# Patient Record
Sex: MALE | Race: WHITE | NOT HISPANIC OR LATINO | URBAN - METROPOLITAN AREA
[De-identification: names, ages, dates, MRNs, and addresses within clinical notes are randomized per-mention and may not be internally consistent; named-entity substitution may affect disease eponyms.]

---

## 2017-10-23 ENCOUNTER — INPATIENT (INPATIENT)
Facility: HOSPITAL | Age: 32
LOS: 4 days | Discharge: HOME | End: 2017-10-28
Attending: INTERNAL MEDICINE

## 2017-10-23 DIAGNOSIS — F43.10 POST-TRAUMATIC STRESS DISORDER, UNSPECIFIED: ICD-10-CM

## 2017-10-23 DIAGNOSIS — F93.0 SEPARATION ANXIETY DISORDER OF CHILDHOOD: ICD-10-CM

## 2017-10-23 DIAGNOSIS — F13.20 SEDATIVE, HYPNOTIC OR ANXIOLYTIC DEPENDENCE, UNCOMPLICATED: ICD-10-CM

## 2017-10-23 DIAGNOSIS — F17.200 NICOTINE DEPENDENCE, UNSPECIFIED, UNCOMPLICATED: ICD-10-CM

## 2017-10-23 DIAGNOSIS — F11.20 OPIOID DEPENDENCE, UNCOMPLICATED: ICD-10-CM

## 2017-10-23 DIAGNOSIS — F15.20 OTHER STIMULANT DEPENDENCE, UNCOMPLICATED: ICD-10-CM

## 2017-10-23 DIAGNOSIS — F33.2 MAJOR DEPRESSIVE DISORDER, RECURRENT SEVERE WITHOUT PSYCHOTIC FEATURES: ICD-10-CM

## 2017-10-23 DIAGNOSIS — G40.909 EPILEPSY, UNSPECIFIED, NOT INTRACTABLE, WITHOUT STATUS EPILEPTICUS: ICD-10-CM

## 2017-10-23 DIAGNOSIS — F41.9 ANXIETY DISORDER, UNSPECIFIED: ICD-10-CM

## 2017-10-23 DIAGNOSIS — F41.1 GENERALIZED ANXIETY DISORDER: ICD-10-CM

## 2017-10-28 ENCOUNTER — INPATIENT (INPATIENT)
Facility: HOSPITAL | Age: 32
LOS: 9 days | Discharge: HOME | End: 2017-11-07
Attending: INTERNAL MEDICINE

## 2017-10-28 DIAGNOSIS — F93.0 SEPARATION ANXIETY DISORDER OF CHILDHOOD: ICD-10-CM

## 2017-10-28 DIAGNOSIS — F15.20 OTHER STIMULANT DEPENDENCE, UNCOMPLICATED: ICD-10-CM

## 2017-10-28 DIAGNOSIS — F13.20 SEDATIVE, HYPNOTIC OR ANXIOLYTIC DEPENDENCE, UNCOMPLICATED: ICD-10-CM

## 2017-10-28 DIAGNOSIS — F11.20 OPIOID DEPENDENCE, UNCOMPLICATED: ICD-10-CM

## 2017-10-28 DIAGNOSIS — F41.9 ANXIETY DISORDER, UNSPECIFIED: ICD-10-CM

## 2017-10-28 DIAGNOSIS — F43.10 POST-TRAUMATIC STRESS DISORDER, UNSPECIFIED: ICD-10-CM

## 2017-10-28 DIAGNOSIS — F17.200 NICOTINE DEPENDENCE, UNSPECIFIED, UNCOMPLICATED: ICD-10-CM

## 2017-10-28 DIAGNOSIS — G40.909 EPILEPSY, UNSPECIFIED, NOT INTRACTABLE, WITHOUT STATUS EPILEPTICUS: ICD-10-CM

## 2017-11-01 DIAGNOSIS — F17.200 NICOTINE DEPENDENCE, UNSPECIFIED, UNCOMPLICATED: ICD-10-CM

## 2017-11-01 DIAGNOSIS — F32.9 MAJOR DEPRESSIVE DISORDER, SINGLE EPISODE, UNSPECIFIED: ICD-10-CM

## 2017-11-01 DIAGNOSIS — G40.909 EPILEPSY, UNSPECIFIED, NOT INTRACTABLE, WITHOUT STATUS EPILEPTICUS: ICD-10-CM

## 2017-11-01 DIAGNOSIS — F41.9 ANXIETY DISORDER, UNSPECIFIED: ICD-10-CM

## 2017-11-01 DIAGNOSIS — F43.10 POST-TRAUMATIC STRESS DISORDER, UNSPECIFIED: ICD-10-CM

## 2017-11-01 DIAGNOSIS — F11.20 OPIOID DEPENDENCE, UNCOMPLICATED: ICD-10-CM

## 2017-11-12 DIAGNOSIS — F11.20 OPIOID DEPENDENCE, UNCOMPLICATED: ICD-10-CM

## 2017-11-12 DIAGNOSIS — F17.210 NICOTINE DEPENDENCE, CIGARETTES, UNCOMPLICATED: ICD-10-CM

## 2017-11-12 DIAGNOSIS — Z51.89 ENCOUNTER FOR OTHER SPECIFIED AFTERCARE: ICD-10-CM

## 2017-11-12 DIAGNOSIS — F41.1 GENERALIZED ANXIETY DISORDER: ICD-10-CM

## 2017-11-12 DIAGNOSIS — F32.9 MAJOR DEPRESSIVE DISORDER, SINGLE EPISODE, UNSPECIFIED: ICD-10-CM

## 2017-11-12 DIAGNOSIS — F13.20 SEDATIVE, HYPNOTIC OR ANXIOLYTIC DEPENDENCE, UNCOMPLICATED: ICD-10-CM

## 2017-11-12 DIAGNOSIS — F43.10 POST-TRAUMATIC STRESS DISORDER, UNSPECIFIED: ICD-10-CM

## 2017-11-12 DIAGNOSIS — G40.909 EPILEPSY, UNSPECIFIED, NOT INTRACTABLE, WITHOUT STATUS EPILEPTICUS: ICD-10-CM

## 2018-04-06 ENCOUNTER — INPATIENT (INPATIENT)
Facility: HOSPITAL | Age: 33
LOS: 12 days | Discharge: HOME | End: 2018-04-19
Attending: PSYCHIATRY & NEUROLOGY | Admitting: PSYCHIATRY & NEUROLOGY

## 2018-04-06 VITALS
DIASTOLIC BLOOD PRESSURE: 70 MMHG | SYSTOLIC BLOOD PRESSURE: 120 MMHG | RESPIRATION RATE: 20 BRPM | TEMPERATURE: 97 F | HEART RATE: 78 BPM

## 2018-04-06 DIAGNOSIS — F17.210 NICOTINE DEPENDENCE, CIGARETTES, UNCOMPLICATED: ICD-10-CM

## 2018-04-06 DIAGNOSIS — R69 ILLNESS, UNSPECIFIED: ICD-10-CM

## 2018-04-06 DIAGNOSIS — F11.10 OPIOID ABUSE, UNCOMPLICATED: ICD-10-CM

## 2018-04-06 DIAGNOSIS — F31.32 BIPOLAR DISORDER, CURRENT EPISODE DEPRESSED, MODERATE: ICD-10-CM

## 2018-04-06 DIAGNOSIS — F13.20 SEDATIVE, HYPNOTIC OR ANXIOLYTIC DEPENDENCE, UNCOMPLICATED: ICD-10-CM

## 2018-04-06 LAB
ALBUMIN SERPL ELPH-MCNC: 3.7 G/DL — SIGNIFICANT CHANGE UP (ref 3.5–5.2)
ALP SERPL-CCNC: 48 U/L — SIGNIFICANT CHANGE UP (ref 30–115)
ALT FLD-CCNC: 11 U/L — SIGNIFICANT CHANGE UP (ref 0–41)
APAP SERPL-MCNC: <5 UG/ML — LOW (ref 10–30)
AST SERPL-CCNC: 14 U/L — SIGNIFICANT CHANGE UP (ref 0–41)
BASOPHILS # BLD AUTO: 0.03 K/UL — SIGNIFICANT CHANGE UP (ref 0–0.2)
BASOPHILS NFR BLD AUTO: 0.6 % — SIGNIFICANT CHANGE UP (ref 0–1)
BILIRUB SERPL-MCNC: <0.2 MG/DL — SIGNIFICANT CHANGE UP (ref 0.2–1.2)
BUN SERPL-MCNC: 13 MG/DL — SIGNIFICANT CHANGE UP (ref 10–20)
CALCIUM SERPL-MCNC: 8.2 MG/DL — LOW (ref 8.5–10.1)
CHLORIDE SERPL-SCNC: 103 MMOL/L — SIGNIFICANT CHANGE UP (ref 98–110)
CO2 SERPL-SCNC: 30 MMOL/L — SIGNIFICANT CHANGE UP (ref 17–32)
CREAT SERPL-MCNC: 1 MG/DL — SIGNIFICANT CHANGE UP (ref 0.7–1.5)
EOSINOPHIL # BLD AUTO: 0.27 K/UL — SIGNIFICANT CHANGE UP (ref 0–0.7)
EOSINOPHIL NFR BLD AUTO: 5.3 % — SIGNIFICANT CHANGE UP (ref 0–8)
ETHANOL SERPL-MCNC: <10 MG/DL — HIGH
GLUCOSE SERPL-MCNC: 94 MG/DL — SIGNIFICANT CHANGE UP (ref 70–99)
HCT VFR BLD CALC: 31.4 % — LOW (ref 42–52)
HGB BLD-MCNC: 10.5 G/DL — LOW (ref 14–18)
IMM GRANULOCYTES NFR BLD AUTO: 0.4 % — HIGH (ref 0.1–0.3)
LYMPHOCYTES # BLD AUTO: 1.82 K/UL — SIGNIFICANT CHANGE UP (ref 1.2–3.4)
LYMPHOCYTES # BLD AUTO: 35.5 % — SIGNIFICANT CHANGE UP (ref 20.5–51.1)
MCHC RBC-ENTMCNC: 30.1 PG — SIGNIFICANT CHANGE UP (ref 27–31)
MCHC RBC-ENTMCNC: 33.4 G/DL — SIGNIFICANT CHANGE UP (ref 32–37)
MCV RBC AUTO: 90 FL — SIGNIFICANT CHANGE UP (ref 80–94)
MONOCYTES # BLD AUTO: 0.52 K/UL — SIGNIFICANT CHANGE UP (ref 0.1–0.6)
MONOCYTES NFR BLD AUTO: 10.1 % — HIGH (ref 1.7–9.3)
NEUTROPHILS # BLD AUTO: 2.47 K/UL — SIGNIFICANT CHANGE UP (ref 1.4–6.5)
NEUTROPHILS NFR BLD AUTO: 48.1 % — SIGNIFICANT CHANGE UP (ref 42.2–75.2)
PLATELET # BLD AUTO: 188 K/UL — SIGNIFICANT CHANGE UP (ref 130–400)
POTASSIUM SERPL-MCNC: 4.1 MMOL/L — SIGNIFICANT CHANGE UP (ref 3.5–5)
POTASSIUM SERPL-SCNC: 4.1 MMOL/L — SIGNIFICANT CHANGE UP (ref 3.5–5)
PROT SERPL-MCNC: 6 G/DL — SIGNIFICANT CHANGE UP (ref 6–8)
RBC # BLD: 3.49 M/UL — LOW (ref 4.7–6.1)
RBC # FLD: 13.6 % — SIGNIFICANT CHANGE UP (ref 11.5–14.5)
SALICYLATES SERPL-MCNC: <0.3 MG/DL — LOW (ref 4–30)
SODIUM SERPL-SCNC: 142 MMOL/L — SIGNIFICANT CHANGE UP (ref 135–146)
WBC # BLD: 5.13 K/UL — SIGNIFICANT CHANGE UP (ref 4.8–10.8)
WBC # FLD AUTO: 5.13 K/UL — SIGNIFICANT CHANGE UP (ref 4.8–10.8)
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
## 2018-04-06 NOTE — ED BEHAVIORAL HEALTH ASSESSMENT NOTE - RISK ASSESSMENT
Pt is male, + mood symptoms, + prior attempts, +substance use, + impulsive and limited support, endorsing active SI with plan  Will be admitted to IPP for mood stablization

## 2018-04-06 NOTE — ED PROVIDER NOTE - NS ED ROS FT
Eyes:  No visual changes, eye pain or discharge.  ENMT:  No hearing changes, pain, discharge or infections. No neck pain or stiffness.  Cardiac:  No chest pain, SOB or edema. No chest pain with exertion.  Respiratory:  No cough or respiratory distress. No hemoptysis. No history of asthma or RAD.  GI:  No nausea, vomiting, diarrhea or abdominal pain.  :  No dysuria, frequency or burning.  MS:  No myalgia, muscle weakness, joint pain or back pain.  Neuro:  No headache or weakness.  No LOC.  Skin:  No skin rash.   Endocrine: No history of thyroid disease or diabetes.

## 2018-04-06 NOTE — ED BEHAVIORAL HEALTH ASSESSMENT NOTE - DIFFERENTIAL
Depression, bipolar disorder, substance induced mood disorder  opioid use disorder, sedative/hypnotic use disorder

## 2018-04-06 NOTE — ED BEHAVIORAL HEALTH ASSESSMENT NOTE - PSYCHIATRIC ISSUES AND PLAN (INCLUDE STANDING AND PRN MEDICATION)
Continue Neurontin 600mg q8, Remeron 15mg qHS, will restart Lamictal 25mg q24 with plan to titrate to therapeutic dose

## 2018-04-06 NOTE — ED ADULT NURSE NOTE - NS TRANSFER PATIENT BELONGINGS
bluetube bagpack with additional item shoe coat/Electronic Device (specify)/Other belongings/Cell Phone/PDA (specify)/Clothing

## 2018-04-06 NOTE — ED BEHAVIORAL HEALTH ASSESSMENT NOTE - SUICIDE RISK FACTORS
Agitation/severe anxiety/Unable to engage in safety planning/Substance abuse/dependence/Hopelessness/Mood episode/Highly impulsive behavior
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
## 2018-04-06 NOTE — ED PROVIDER NOTE - OBJECTIVE STATEMENT
32 y M pmh including drug use history, recently at methadone clinic, psychiatric history cc suicidal ideation with plan to go into woods and slit wrists. denies drug or alcohol use today. denies homicidal ideation. endorses depression.

## 2018-04-06 NOTE — ED BEHAVIORAL HEALTH ASSESSMENT NOTE - NS ED BHA HOMICIDALITY PRESENT AGGRESSION PROPERTY LIFETIME
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

## 2018-04-06 NOTE — ED PROVIDER NOTE - ATTENDING CONTRIBUTION TO CARE
I personally evaluated the patient. I reviewed the Resident’s or Physician Assistant’s note (as assigned above), and agree with the findings and plan except as documented in my note.  I have seen this patient with a scribe. Please see documentation of my history and physical examination and plan. I agree with scribe documentation except as indicated in my notes.
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
## 2018-04-06 NOTE — ED PROVIDER NOTE - PHYSICAL EXAMINATION
CONSTITUTIONAL: Well-developed; well-nourished; in no acute distress.   SKIN: warm, dry  HEAD: Normocephalic; atraumatic.  EYES: no conj injection  ENT: No nasal discharge; airway clear.  NECK: Supple; non tender.  CARD: S1, S2 normal; no murmurs, gallops, or rubs. Regular rate and rhythm.   RESP: No wheezes, rales or rhonchi.  ABD: soft ntnd  EXT: Normal ROM.  No clubbing, cyanosis or edema.   LYMPH: No acute cervical adenopathy.  NEURO: Alert, oriented, grossly unremarkable  PSYCH: flat affect
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
## 2018-04-06 NOTE — ED BEHAVIORAL HEALTH ASSESSMENT NOTE - AXIS IV
Housing problems/Problems with primary support/Problem related to social environment/Occupational problems/Economic problems

## 2018-04-06 NOTE — ED BEHAVIORAL HEALTH ASSESSMENT NOTE - SUBSTANCE ISSUES AND PLAN (INCLUDE STANDING AND PRN MEDICATION)
Will start tapering Klonopin dose from 1mg q8 and 0.5mg aAM + qnoon+ 1mg qHS. As pt may have high tolerance due to additional use will put klonopin  0.5mg q6 prn for objective signs of withdrawal including tachycardia, diaphoresis, tremor; Nicotine patch; Methadone dose needs to be confirmed tomorrow morning ([facility] [phone])

## 2018-04-06 NOTE — ED BEHAVIORAL HEALTH ASSESSMENT NOTE - DESCRIPTION
Athlete's foot Pt grew up with parents and 2 brothers. States he went to 8th grade. Never . No children. Pt is calm and cooperative.

## 2018-04-06 NOTE — ED BEHAVIORAL HEALTH ASSESSMENT NOTE - DESCRIPTION (FIRST USE, LAST USE, QUANTITY, FREQUENCY, DURATION)
Smoke 1 PPD 1-2 joints per week; Since 13; Last use this week Long history of heroin including IV use. Currently on MMTP and denies other opioid use for last 9 months Pt currently prescribed Klonopin 1mg q8 but reports periodic use of xanax in addition

## 2018-04-06 NOTE — ED PROVIDER NOTE - MEDICAL DECISION MAKING DETAILS
psych evaluted pt report admission to psych, pt aware and agrees, on 1:1 currently cooperative, expresses SI.
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
## 2018-04-07 DIAGNOSIS — F11.20 OPIOID DEPENDENCE, UNCOMPLICATED: ICD-10-CM

## 2018-04-07 DIAGNOSIS — F32.9 MAJOR DEPRESSIVE DISORDER, SINGLE EPISODE, UNSPECIFIED: ICD-10-CM

## 2018-04-07 DIAGNOSIS — S60.219A CONTUSION OF UNSPECIFIED WRIST, INITIAL ENCOUNTER: ICD-10-CM

## 2018-04-07 DIAGNOSIS — R45.851 SUICIDAL IDEATIONS: ICD-10-CM

## 2018-04-07 DIAGNOSIS — S05.00XA INJURY OF CONJUNCTIVA AND CORNEAL ABRASION WITHOUT FOREIGN BODY, UNSPECIFIED EYE, INITIAL ENCOUNTER: ICD-10-CM

## 2018-04-07 RX ORDER — GENTAMICIN SULFATE 3 MG/ML
1 SOLUTION/ DROPS OPHTHALMIC DAILY
Qty: 0 | Refills: 0 | Status: COMPLETED | OUTPATIENT
Start: 2018-04-07 | End: 2018-04-14

## 2018-04-07 RX ORDER — CLONAZEPAM 1 MG
0.5 TABLET ORAL EVERY 6 HOURS
Qty: 0 | Refills: 0 | Status: DISCONTINUED | OUTPATIENT
Start: 2018-04-07 | End: 2018-04-07

## 2018-04-07 RX ORDER — METHADONE HYDROCHLORIDE 40 MG/1
165 TABLET ORAL DAILY
Qty: 0 | Refills: 0 | Status: DISCONTINUED | OUTPATIENT
Start: 2018-04-07 | End: 2018-04-09

## 2018-04-07 RX ORDER — GABAPENTIN 400 MG/1
600 CAPSULE ORAL THREE TIMES A DAY
Qty: 0 | Refills: 0 | Status: DISCONTINUED | OUTPATIENT
Start: 2018-04-07 | End: 2018-04-13

## 2018-04-07 RX ORDER — CLONAZEPAM 1 MG
0.5 TABLET ORAL DAILY
Qty: 0 | Refills: 0 | Status: DISCONTINUED | OUTPATIENT
Start: 2018-04-07 | End: 2018-04-11

## 2018-04-07 RX ORDER — METHADONE HYDROCHLORIDE 40 MG/1
5 TABLET ORAL DAILY
Qty: 0 | Refills: 0 | Status: DISCONTINUED | OUTPATIENT
Start: 2018-04-07 | End: 2018-04-14

## 2018-04-07 RX ORDER — NICOTINE POLACRILEX 2 MG
1 GUM BUCCAL DAILY
Qty: 0 | Refills: 0 | Status: DISCONTINUED | OUTPATIENT
Start: 2018-04-07 | End: 2018-04-19

## 2018-04-07 RX ORDER — CLONAZEPAM 1 MG
1 TABLET ORAL AT BEDTIME
Qty: 0 | Refills: 0 | Status: DISCONTINUED | OUTPATIENT
Start: 2018-04-07 | End: 2018-04-11

## 2018-04-07 RX ORDER — METHADONE HYDROCHLORIDE 40 MG/1
160 TABLET ORAL DAILY
Qty: 0 | Refills: 0 | Status: DISCONTINUED | OUTPATIENT
Start: 2018-04-07 | End: 2018-04-14

## 2018-04-07 RX ORDER — MIRTAZAPINE 45 MG/1
15 TABLET, ORALLY DISINTEGRATING ORAL AT BEDTIME
Qty: 0 | Refills: 0 | Status: DISCONTINUED | OUTPATIENT
Start: 2018-04-07 | End: 2018-04-16

## 2018-04-07 RX ORDER — CLONAZEPAM 1 MG
0.5 TABLET ORAL
Qty: 0 | Refills: 0 | Status: DISCONTINUED | OUTPATIENT
Start: 2018-04-07 | End: 2018-04-11

## 2018-04-07 RX ORDER — LAMOTRIGINE 25 MG/1
25 TABLET, ORALLY DISINTEGRATING ORAL DAILY
Qty: 0 | Refills: 0 | Status: DISCONTINUED | OUTPATIENT
Start: 2018-04-07 | End: 2018-04-09

## 2018-04-07 RX ADMIN — METHADONE HYDROCHLORIDE 5 MILLIGRAM(S): 40 TABLET ORAL at 09:21

## 2018-04-07 RX ADMIN — GABAPENTIN 600 MILLIGRAM(S): 400 CAPSULE ORAL at 20:36

## 2018-04-07 RX ADMIN — GABAPENTIN 600 MILLIGRAM(S): 400 CAPSULE ORAL at 13:37

## 2018-04-07 RX ADMIN — LAMOTRIGINE 25 MILLIGRAM(S): 25 TABLET, ORALLY DISINTEGRATING ORAL at 09:17

## 2018-04-07 RX ADMIN — Medication 1 MILLIGRAM(S): at 20:36

## 2018-04-07 RX ADMIN — Medication 0.5 MILLIGRAM(S): at 12:07

## 2018-04-07 RX ADMIN — Medication 1 PATCH: at 09:18

## 2018-04-07 RX ADMIN — GABAPENTIN 600 MILLIGRAM(S): 400 CAPSULE ORAL at 09:17

## 2018-04-07 RX ADMIN — Medication 0.5 MILLIGRAM(S): at 09:17

## 2018-04-07 RX ADMIN — MIRTAZAPINE 15 MILLIGRAM(S): 45 TABLET, ORALLY DISINTEGRATING ORAL at 20:37

## 2018-04-07 RX ADMIN — METHADONE HYDROCHLORIDE 160 MILLIGRAM(S): 40 TABLET ORAL at 09:21

## 2018-04-07 NOTE — H&P ADULT - ASSESSMENT
32 y M pmh including drug use history, recently at methadone clinic, psychiatric history cc suicidal ideation with plan to go into woods and slit wrists. denies drug or alcohol use today. denies homicidal ideation. endorses depression.  lab

## 2018-04-07 NOTE — H&P ADULT - NSHPLABSRESULTS_GEN_ALL_CORE
10.5   5.13  )-----------( 188      ( 06 Apr 2018 21:11 )             31.4       04-06    142  |  103  |  13  ----------------------------<  94  4.1   |  30  |  1.0    Ca    8.2<L>      06 Apr 2018 21:11    TPro  6.0  /  Alb  3.7  /  TBili  <0.2  /  DBili  x   /  AST  14  /  ALT  11  /  AlkPhos  48  04-06                      Lactate Trend            CAPILLARY BLOOD GLUCOSE
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
## 2018-04-07 NOTE — H&P ADULT - NSHPPHYSICALEXAM_GEN_ALL_CORE
GENERAL:  33y/o Male NAD, resting comfortably.  HEAD:  Atraumatic, Normocephalic  EYES: EOMI, PERRLA, conjunctiva and sclera care red  NECK: Supple, No JVD, no cervical lymphadenopathy, non-tender  CHEST/LUNG: Clear to auscultation bilaterally; No wheeze, rhonchi, or rales  HEART: Regular rate and rhythm; S1&S2  ABDOMEN: Soft, Nontender, Nondistended x 4 quadrants; Bowel sounds present  EXTREMITIES:   Peripheral Pulses Present, No clubbing, no cyanosis, or no edema, no calf tenderness  PSYCH: AAOx3, cooperative, appropriate  NEUROLOGY: WNL  SKIN: fungal /foot area

## 2018-04-07 NOTE — H&P ADULT - NSHPREVIEWOFSYSTEMS_GEN_ALL_CORE
REVIEW OF SYSTEMS:    CONSTITUTIONAL: No weakness, fevers or chills  EYES/ENT: No visual changes;  No vertigo or throat pain  red slera/congunctiva  NECK: No pain or stiffness  RESPIRATORY: No cough, wheezing, hemoptysis; No shortness of breath  CARDIOVASCULAR: No chest pain or palpitations  GASTROINTESTINAL: No abdominal or epigastric pain. No nausea, vomiting, or hematemesis; No diarrhea or constipation. No melena or hematochezia.  GENITOURINARY: No dysuria, frequency or hematuria  NEUROLOGICAL: No numbness or weakness  SKIN: No itching, rashes

## 2018-04-07 NOTE — PATIENT PROFILE BEHAVIORAL HEALTH - NSBHTYPATTPT_PSY_A_CORE
aborted/previous attempts/Tried to slit wrist one month ago; OD on heroin after father's death in 2016

## 2018-04-07 NOTE — PATIENT PROFILE BEHAVIORAL HEALTH - REASON FOR ADMISSION
Patient came in with complains of not wanting to "live anymore; and my life is a waste" related to increasing depression and substance abuse.

## 2018-04-08 RX ADMIN — Medication 1 MILLIGRAM(S): at 21:14

## 2018-04-08 RX ADMIN — METHADONE HYDROCHLORIDE 5 MILLIGRAM(S): 40 TABLET ORAL at 08:07

## 2018-04-08 RX ADMIN — GABAPENTIN 600 MILLIGRAM(S): 400 CAPSULE ORAL at 13:10

## 2018-04-08 RX ADMIN — Medication 0.5 MILLIGRAM(S): at 11:16

## 2018-04-08 RX ADMIN — Medication 1 PATCH: at 08:15

## 2018-04-08 RX ADMIN — GABAPENTIN 600 MILLIGRAM(S): 400 CAPSULE ORAL at 21:13

## 2018-04-08 RX ADMIN — MIRTAZAPINE 15 MILLIGRAM(S): 45 TABLET, ORALLY DISINTEGRATING ORAL at 21:13

## 2018-04-08 RX ADMIN — LAMOTRIGINE 25 MILLIGRAM(S): 25 TABLET, ORALLY DISINTEGRATING ORAL at 08:09

## 2018-04-08 RX ADMIN — METHADONE HYDROCHLORIDE 160 MILLIGRAM(S): 40 TABLET ORAL at 08:07

## 2018-04-08 RX ADMIN — Medication 0.5 MILLIGRAM(S): at 08:07

## 2018-04-08 RX ADMIN — GABAPENTIN 600 MILLIGRAM(S): 400 CAPSULE ORAL at 08:08

## 2018-04-08 NOTE — PROGRESS NOTE BEHAVIORAL HEALTH - OTHER
Left eye with +Subconjunctival hemorrhage, laceration and ecchymoses; appears drowsy States he feels less depressed

## 2018-04-08 NOTE — CONSULT NOTE ADULT - PROBLEM SELECTOR RECOMMENDATION 2
continue present treatment as per psych plan as reviewed  Medically stable with no new changes in treatment  will continue to monitor medical status while being treated on psych continue present treatment as per psych plan as reviewed  Medically stable with no new changes in treatment  will continue to monitor medical status while being treated on psych  nko tx of mallika stylesijazzy for skin of feet

## 2018-04-08 NOTE — CONSULT NOTE ADULT - SUBJECTIVE AND OBJECTIVE BOX
MARENJACK PANDA  32y  Male      Patient is a 32y old  Male who presents with a chief complaint of Patient came in with complains of not wanting to "live anymore; and my life is a waste" related to increasing depression and substance abuse. (07 Apr 2018 05:24)    HPI:  32 y M pmh including drug use history, recently at methadone clinic, psychiatric history cc suicidal ideation with plan to go into woods and slit wrists. denies drug or alcohol use today. denies homicidal ideation. endorses depression. (07 Apr 2018 06:17)    INTERVAL HPI/OVERNIGHT EVENTS:  HEALTH ISSUES - PROBLEM Dx:  Methadone maintenance therapy patient: Methadone maintenance therapy patient  Wrist contusion: Wrist contusion  Conjunctival abrasion: Conjunctival abrasion  Depression, unspecified depression type: Depression, unspecified depression type  Suicidal ideation: Suicidal ideation  Deferred condition on axis II  Cigarette nicotine dependence without complication  Sedative hypnotic or anxiolytic dependence  Opioid use disorder, mild, on maintenance therapy  Bipolar affective disorder, currently depressed, moderate        PAST MEDICAL & SURGICAL HISTORY:  Depression, unspecified depression type  Suicidal ideation    FAMILY HISTORY:  No pertinent family history in first degree relatives      MEDICATIONS  (STANDING):  Allergies    Haldol (Dystonic RXN)  Talwin (Unknown)    Intolerances    clonazePAM Tablet 0.5 milliGRAM(s) Oral daily  clonazePAM Tablet 0.5 milliGRAM(s) Oral <User Schedule>  clonazePAM Tablet 1 milliGRAM(s) Oral at bedtime  gabapentin 600 milliGRAM(s) Oral three times a day  gentamicin 0.3% Ophthalmic Solution 1 Drop(s) Both EYES daily  lamoTRIgine 25 milliGRAM(s) Oral daily  methadone    Tablet 165 milliGRAM(s) Oral daily  methadone    Tablet 160 milliGRAM(s) Oral daily  methadone    Tablet 5 milliGRAM(s) Oral daily  mirtazapine 15 milliGRAM(s) Oral at bedtime  nicotine - 21 mG/24Hr(s) Patch 1 patch Transdermal daily    MEDICATIONS  (PRN):  LORazepam     Tablet 2 milliGRAM(s) Oral every 6 hours PRN Severe agitation or objective signs of benzodiazepine withdrawal (tachycardia, tremor, diaphoresis, etc)    REVIEW OF SYSTEMS:  CONSTITUTIONAL: No fever, weight loss, or fatigue  EYES: No eye pain, visual disturbances, or discharge  ENMT:  No difficulty hearing, tinnitus, vertigo; No sinus or throat pain  NECK: No pain or stiffness  BREASTS: No pain, masses, or nipple discharge  RESPIRATORY: No cough, wheezing, chills or hemoptysis; No shortness of breath  CARDIOVASCULAR: No chest pain, palpitations, dizziness, or leg swelling  GASTROINTESTINAL: No abdominal or epigastric pain. No nausea, vomiting, or hematemesis; No diarrhea or constipation. No melena or hematochezia.  GENITOURINARY: No dysuria, frequency, hematuria, or incontinence  NEUROLOGICAL: No headaches, memory loss, loss of strength, numbness, or tremors  SKIN: No itching, burning, rashes, or lesions   LYMPH NODES: No enlarged glands  ENDOCRINE: No heat or cold intolerance; No hair loss  MUSCULOSKELETAL: No joint pain or swelling; No muscle, back, or extremity pain  PSYCHIATRIC: No depression, anxiety, mood swings, or difficulty sleeping  HEME/LYMPH: No easy bruising, or bleeding gums  ALLERY AND IMMUNOLOGIC: No hives or eczema    T(C): 35.2 (04-08-18 @ 05:52), Max: 36 (04-07-18 @ 13:11)  HR: 77 (04-08-18 @ 08:16) (57 - 77)  BP: 112/65 (04-08-18 @ 08:16) (110/76 - 129/69)  RR: 17 (04-08-18 @ 08:16) (16 - 18)  SpO2: --  Wt(kg): --Vital Signs Last 24 Hrs  T(C): 35.2 (08 Apr 2018 05:52), Max: 36 (07 Apr 2018 13:11)  T(F): 95.4 (08 Apr 2018 05:52), Max: 96.8 (07 Apr 2018 13:11)  HR: 77 (08 Apr 2018 08:16) (57 - 77)  BP: 112/65 (08 Apr 2018 08:16) (110/76 - 129/69)  BP(mean): --  RR: 17 (08 Apr 2018 08:16) (16 - 18)  SpO2: --    PHYSICAL EXAM:  GENERAL: NAD, well-groomed, well-developed  HEAD:  Atraumatic, Normocephalic  EYES: EOMI, PERRLA, conjunctiva and sclera clear  ENMT: No tonsillar erythema, exudates, or enlargement; Moist mucous membranes, Good dentition, No lesions  NECK: Supple, No JVD, Normal thyroid  NERVOUS SYSTEM:  Alert & Oriented X3, Good concentration; Motor Strength 5/5 B/L upper and lower extremities; DTRs 2+ intact and symmetric  CHEST/LUNG: Clear to percussion bilaterally; No rales, rhonchi, wheezing, or rubs  HEART: Regular rate and rhythm; No murmurs, rubs, or gallops  ABDOMEN: Soft, Nontender, Nondistended; Bowel sounds present  EXTREMITIES:  2+ Peripheral Pulses, No clubbing, cyanosis, or edema  r witrst contesion  LYMPH: No lymphadenopathy noted  SKIN: No rashes or lesions    Consultant(s) Notes Reviewed:  [x ] YES  [ ] NO  Care Discussed with Consultants/Other Providers [ x] YES  [ ] NO    LABS:                        10.5   5.13  )-----------( 188      ( 06 Apr 2018 21:11 )             31.4     04-06    142  |  103  |  13  ----------------------------<  94  4.1   |  30  |  1.0    Ca    8.2<L>      06 Apr 2018 21:11    TPro  6.0  /  Alb  3.7  /  TBili  <0.2  /  DBili  x   /  AST  14  /  ALT  11  /  AlkPhos  48  04-06        CAPILLARY BLOOD GLUCOSE                RADIOLOGY & ADDITIONAL TESTS:    Imaging Personally Reviewed:  [ ] YES  [ ] NO MARENJACK PANDA  32y  Male      Patient is a 32y old  Male who presents with a chief complaint of Patient came in with complains of not wanting to "live anymore; and my life is a waste" related to increasing depression and substance abuse. (07 Apr 2018 05:24)    HPI:  32 y M pmh including drug use history, recently at methadone clinic, psychiatric history cc suicidal ideation with plan to go into woods and slit wrists. denies drug or alcohol use today. denies homicidal ideation. endorses depression. (07 Apr 2018 06:17)    INTERVAL HPI/OVERNIGHT EVENTS:  HEALTH ISSUES - PROBLEM Dx:  Methadone maintenance therapy patient: Methadone maintenance therapy patient  Wrist contusion: Wrist contusion  Conjunctival abrasion: Conjunctival abrasion  Depression, unspecified depression type: Depression, unspecified depression type  Suicidal ideation: Suicidal ideation  Deferred condition on axis II  Cigarette nicotine dependence without complication  Sedative hypnotic or anxiolytic dependence  Opioid use disorder, mild, on maintenance therapy  Bipolar affective disorder, currently depressed, moderate        PAST MEDICAL & SURGICAL HISTORY:  Depression, unspecified depression type  Suicidal ideation    FAMILY HISTORY:  No pertinent family history in first degree relatives      MEDICATIONS  (STANDING):  Allergies    Haldol (Dystonic RXN)  Talwin (Unknown)    Intolerances    clonazePAM Tablet 0.5 milliGRAM(s) Oral daily  clonazePAM Tablet 0.5 milliGRAM(s) Oral <User Schedule>  clonazePAM Tablet 1 milliGRAM(s) Oral at bedtime  gabapentin 600 milliGRAM(s) Oral three times a day  gentamicin 0.3% Ophthalmic Solution 1 Drop(s) Both EYES daily  lamoTRIgine 25 milliGRAM(s) Oral daily  methadone    Tablet 165 milliGRAM(s) Oral daily  methadone    Tablet 160 milliGRAM(s) Oral daily  methadone    Tablet 5 milliGRAM(s) Oral daily  mirtazapine 15 milliGRAM(s) Oral at bedtime  nicotine - 21 mG/24Hr(s) Patch 1 patch Transdermal daily    MEDICATIONS  (PRN):  LORazepam     Tablet 2 milliGRAM(s) Oral every 6 hours PRN Severe agitation or objective signs of benzodiazepine withdrawal (tachycardia, tremor, diaphoresis, etc)    REVIEW OF SYSTEMS:  CONSTITUTIONAL: No fever, weight loss, or fatigue  EYES: No eye pain, visual disturbances, or discharge  ENMT:  No difficulty hearing, tinnitus, vertigo; No sinus or throat pain  NECK: No pain or stiffness  BREASTS: No pain, masses, or nipple discharge  RESPIRATORY: No cough, wheezing, chills or hemoptysis; No shortness of breath  CARDIOVASCULAR: No chest pain, palpitations, dizziness, or leg swelling  GASTROINTESTINAL: No abdominal or epigastric pain. No nausea, vomiting, or hematemesis; No diarrhea or constipation. No melena or hematochezia.  GENITOURINARY: No dysuria, frequency, hematuria, or incontinence  NEUROLOGICAL: No headaches, memory loss, loss of strength, numbness, or tremors  SKIN: No itching, burning, rashes, or lesions   LYMPH NODES: No enlarged glands  ENDOCRINE: No heat or cold intolerance; No hair loss  MUSCULOSKELETAL: No joint pain or swelling; No muscle, back, or extremity pain  PSYCHIATRIC: No depression, anxiety, mood swings, or difficulty sleeping  HEME/LYMPH: No easy bruising, or bleeding gums  ALLERY AND IMMUNOLOGIC: No hives or eczema    T(C): 35.2 (04-08-18 @ 05:52), Max: 36 (04-07-18 @ 13:11)  HR: 77 (04-08-18 @ 08:16) (57 - 77)  BP: 112/65 (04-08-18 @ 08:16) (110/76 - 129/69)  RR: 17 (04-08-18 @ 08:16) (16 - 18)  SpO2: --  Wt(kg): --Vital Signs Last 24 Hrs  T(C): 35.2 (08 Apr 2018 05:52), Max: 36 (07 Apr 2018 13:11)  T(F): 95.4 (08 Apr 2018 05:52), Max: 96.8 (07 Apr 2018 13:11)  HR: 77 (08 Apr 2018 08:16) (57 - 77)  BP: 112/65 (08 Apr 2018 08:16) (110/76 - 129/69)  BP(mean): --  RR: 17 (08 Apr 2018 08:16) (16 - 18)  SpO2: --    PHYSICAL EXAM:  GENERAL: NAD, well-groomed, well-developed  HEAD:  Atraumatic, Normocephalic  EYES: EOMI, PERRLA, conjunctiva and sclera clear left eye subconcutivla hematoma  ENMT: No tonsillar erythema, exudates, or enlargement; Moist mucous membranes, Good dentition, No lesions  NECK: Supple, No JVD, Normal thyroid  NERVOUS SYSTEM:  Alert & Oriented X3, Good concentration; Motor Strength 5/5 B/L upper and lower extremities; DTRs 2+ intact and symmetric  CHEST/LUNG: Clear to percussion bilaterally; No rales, rhonchi, wheezing, or rubs  HEART: Regular rate and rhythm; No murmurs, rubs, or gallops  ABDOMEN: Soft, Nontender, Nondistended; Bowel sounds present  EXTREMITIES:  2+ Peripheral Pulses, No clubbing, cyanosis, or edema  r witrst contesion  LYMPH: No lymphadenopathy noted  SKIN: No rashes or lesions  dry scaley skin feeet    Consultant(s) Notes Reviewed:  [x ] YES  [ ] NO  Care Discussed with Consultants/Other Providers [ x] YES  [ ] NO    LABS:                        10.5   5.13  )-----------( 188      ( 06 Apr 2018 21:11 )             31.4     04-06    142  |  103  |  13  ----------------------------<  94  4.1   |  30  |  1.0    Ca    8.2<L>      06 Apr 2018 21:11    TPro  6.0  /  Alb  3.7  /  TBili  <0.2  /  DBili  x   /  AST  14  /  ALT  11  /  AlkPhos  48  04-06        CAPILLARY BLOOD GLUCOSE                RADIOLOGY & ADDITIONAL TESTS:    Imaging Personally Reviewed:  [ ] YES  [ ] NO

## 2018-04-09 RX ORDER — DIVALPROEX SODIUM 500 MG/1
250 TABLET, DELAYED RELEASE ORAL THREE TIMES A DAY
Qty: 0 | Refills: 0 | Status: DISCONTINUED | OUTPATIENT
Start: 2018-04-09 | End: 2018-04-10

## 2018-04-09 RX ADMIN — GABAPENTIN 600 MILLIGRAM(S): 400 CAPSULE ORAL at 20:15

## 2018-04-09 RX ADMIN — DIVALPROEX SODIUM 250 MILLIGRAM(S): 500 TABLET, DELAYED RELEASE ORAL at 20:15

## 2018-04-09 RX ADMIN — METHADONE HYDROCHLORIDE 5 MILLIGRAM(S): 40 TABLET ORAL at 08:07

## 2018-04-09 RX ADMIN — Medication 1 PATCH: at 08:03

## 2018-04-09 RX ADMIN — GENTAMICIN SULFATE 1 DROP(S): 3 SOLUTION/ DROPS OPHTHALMIC at 08:03

## 2018-04-09 RX ADMIN — Medication 2 MILLIGRAM(S): at 16:17

## 2018-04-09 RX ADMIN — MIRTAZAPINE 15 MILLIGRAM(S): 45 TABLET, ORALLY DISINTEGRATING ORAL at 20:16

## 2018-04-09 RX ADMIN — GABAPENTIN 600 MILLIGRAM(S): 400 CAPSULE ORAL at 16:15

## 2018-04-09 RX ADMIN — DIVALPROEX SODIUM 250 MILLIGRAM(S): 500 TABLET, DELAYED RELEASE ORAL at 16:14

## 2018-04-09 RX ADMIN — LAMOTRIGINE 25 MILLIGRAM(S): 25 TABLET, ORALLY DISINTEGRATING ORAL at 08:02

## 2018-04-09 RX ADMIN — Medication 0.5 MILLIGRAM(S): at 08:07

## 2018-04-09 RX ADMIN — GABAPENTIN 600 MILLIGRAM(S): 400 CAPSULE ORAL at 08:02

## 2018-04-09 RX ADMIN — Medication 1 MILLIGRAM(S): at 20:14

## 2018-04-09 RX ADMIN — METHADONE HYDROCHLORIDE 160 MILLIGRAM(S): 40 TABLET ORAL at 08:12

## 2018-04-09 NOTE — CHART NOTE - NSCHARTNOTEFT_GEN_A_CORE
Rene is a 32 year old single heterosexual  male who was admitted to inpatient psychiatry due to suicidal ideations and wanted to write a letter to his mom saying that he wanted to die.  Since 9/2016 when his father past away, patient increasingly became more depressed.  Patient has a long history of being hospitalized in inpatient psychiatry which began at the age of 13 which was his first hospitalization.  Patient has over 7 hospitalizations.  Patient also has a diagnosis of opiate use disorder and is currently [facility] [place].  Patient states his last usage of heroin was about 10 months ago.  Patient reports living in a shelter for a couple of weeks called [facility] in [place].  Patient was in another shelter before that.  Patient states at least 14 years of incarceration with the last long sentence from 2002 to 2007 when he was in solitary confinement as well.  Patient states he was in special ed classes in school and never had a significant job.  Patient was on parole and it ended in 2013.  Patient has a court date on 4/11/18.  This worker will outreach to  once his mother has the number.  This worker spoke to his mother today.  Patient will possibly be referred to supportive housing and outpatient program upon discharge.

## 2018-04-10 RX ORDER — DIVALPROEX SODIUM 500 MG/1
500 TABLET, DELAYED RELEASE ORAL
Qty: 0 | Refills: 0 | Status: DISCONTINUED | OUTPATIENT
Start: 2018-04-10 | End: 2018-04-12

## 2018-04-10 RX ORDER — QUETIAPINE FUMARATE 200 MG/1
100 TABLET, FILM COATED ORAL
Qty: 0 | Refills: 0 | Status: DISCONTINUED | OUTPATIENT
Start: 2018-04-10 | End: 2018-04-19

## 2018-04-10 RX ADMIN — Medication 0.5 MILLIGRAM(S): at 08:29

## 2018-04-10 RX ADMIN — GABAPENTIN 600 MILLIGRAM(S): 400 CAPSULE ORAL at 21:29

## 2018-04-10 RX ADMIN — GABAPENTIN 600 MILLIGRAM(S): 400 CAPSULE ORAL at 08:30

## 2018-04-10 RX ADMIN — DIVALPROEX SODIUM 500 MILLIGRAM(S): 500 TABLET, DELAYED RELEASE ORAL at 21:29

## 2018-04-10 RX ADMIN — Medication 1 PATCH: at 08:38

## 2018-04-10 RX ADMIN — METHADONE HYDROCHLORIDE 5 MILLIGRAM(S): 40 TABLET ORAL at 08:07

## 2018-04-10 RX ADMIN — GENTAMICIN SULFATE 1 DROP(S): 3 SOLUTION/ DROPS OPHTHALMIC at 08:30

## 2018-04-10 RX ADMIN — GABAPENTIN 600 MILLIGRAM(S): 400 CAPSULE ORAL at 13:47

## 2018-04-10 RX ADMIN — METHADONE HYDROCHLORIDE 160 MILLIGRAM(S): 40 TABLET ORAL at 08:04

## 2018-04-10 RX ADMIN — QUETIAPINE FUMARATE 100 MILLIGRAM(S): 200 TABLET, FILM COATED ORAL at 21:29

## 2018-04-10 RX ADMIN — Medication 0.5 MILLIGRAM(S): at 11:35

## 2018-04-10 RX ADMIN — Medication 1 PATCH: at 08:07

## 2018-04-10 RX ADMIN — MIRTAZAPINE 15 MILLIGRAM(S): 45 TABLET, ORALLY DISINTEGRATING ORAL at 21:29

## 2018-04-10 RX ADMIN — Medication 1 PATCH: at 07:57

## 2018-04-10 RX ADMIN — DIVALPROEX SODIUM 250 MILLIGRAM(S): 500 TABLET, DELAYED RELEASE ORAL at 08:29

## 2018-04-10 RX ADMIN — Medication 1 MILLIGRAM(S): at 21:30

## 2018-04-10 NOTE — PROGRESS NOTE BEHAVIORAL HEALTH - NSBHFUPSUICINTERVALFT_PSY_A_CORE
Pt was admitted with suicidal thoughts but currently feels safe in the hospital and denies any suicidal ideation, plan or intent to die

## 2018-04-11 RX ORDER — CLONAZEPAM 1 MG
1 TABLET ORAL
Qty: 0 | Refills: 0 | Status: DISCONTINUED | OUTPATIENT
Start: 2018-04-11 | End: 2018-04-18

## 2018-04-11 RX ORDER — CLONAZEPAM 1 MG
1 TABLET ORAL
Qty: 0 | Refills: 0 | Status: DISCONTINUED | OUTPATIENT
Start: 2018-04-11 | End: 2018-04-11

## 2018-04-11 RX ORDER — CLONAZEPAM 1 MG
0.5 TABLET ORAL
Qty: 0 | Refills: 0 | Status: DISCONTINUED | OUTPATIENT
Start: 2018-04-11 | End: 2018-04-11

## 2018-04-11 RX ORDER — CLONAZEPAM 1 MG
0.5 TABLET ORAL ONCE
Qty: 0 | Refills: 0 | Status: DISCONTINUED | OUTPATIENT
Start: 2018-04-11 | End: 2018-04-11

## 2018-04-11 RX ADMIN — DIVALPROEX SODIUM 500 MILLIGRAM(S): 500 TABLET, DELAYED RELEASE ORAL at 20:59

## 2018-04-11 RX ADMIN — GABAPENTIN 600 MILLIGRAM(S): 400 CAPSULE ORAL at 08:37

## 2018-04-11 RX ADMIN — GENTAMICIN SULFATE 1 DROP(S): 3 SOLUTION/ DROPS OPHTHALMIC at 08:39

## 2018-04-11 RX ADMIN — Medication 0.5 MILLIGRAM(S): at 14:11

## 2018-04-11 RX ADMIN — GABAPENTIN 600 MILLIGRAM(S): 400 CAPSULE ORAL at 13:46

## 2018-04-11 RX ADMIN — DIVALPROEX SODIUM 500 MILLIGRAM(S): 500 TABLET, DELAYED RELEASE ORAL at 08:32

## 2018-04-11 RX ADMIN — MIRTAZAPINE 15 MILLIGRAM(S): 45 TABLET, ORALLY DISINTEGRATING ORAL at 21:08

## 2018-04-11 RX ADMIN — GABAPENTIN 600 MILLIGRAM(S): 400 CAPSULE ORAL at 20:59

## 2018-04-11 RX ADMIN — Medication 1 PATCH: at 08:39

## 2018-04-11 RX ADMIN — QUETIAPINE FUMARATE 100 MILLIGRAM(S): 200 TABLET, FILM COATED ORAL at 08:38

## 2018-04-11 RX ADMIN — Medication 1 MILLIGRAM(S): at 20:59

## 2018-04-11 RX ADMIN — METHADONE HYDROCHLORIDE 160 MILLIGRAM(S): 40 TABLET ORAL at 08:01

## 2018-04-11 RX ADMIN — Medication 1 PATCH: at 08:31

## 2018-04-11 RX ADMIN — Medication 0.5 MILLIGRAM(S): at 08:31

## 2018-04-11 RX ADMIN — METHADONE HYDROCHLORIDE 5 MILLIGRAM(S): 40 TABLET ORAL at 08:01

## 2018-04-11 RX ADMIN — QUETIAPINE FUMARATE 100 MILLIGRAM(S): 200 TABLET, FILM COATED ORAL at 20:59

## 2018-04-11 NOTE — PROGRESS NOTE ADULT - PROBLEM SELECTOR PLAN 1
continue present treatment as per psych plan as reviewed  Medically stable with no new changes in treatment  will continue to monitor medical status while being treated on psych   left eye imoproved

## 2018-04-11 NOTE — CHART NOTE - NSCHARTNOTEFT_GEN_A_CORE
Patient continues to have an anxious mood.  Patient is less agitated and seems to have a more calm presentation.  Patient will continue to be encouraged to go to groups.  He has already started to go to some.  Patient has been socializing more and is seen more in the day rooms.  Patient continues to have poor insight and judgment.  This worker spoke to his mother today Rhoda and gave her an update.  This worker also spoke with patient's  today René and gave him an update.  Patient has new court dates for the end of April.  [facility].  This worker will continue to meet with patient daily.

## 2018-04-12 RX ORDER — DIVALPROEX SODIUM 500 MG/1
750 TABLET, DELAYED RELEASE ORAL
Qty: 0 | Refills: 0 | Status: DISCONTINUED | OUTPATIENT
Start: 2018-04-12 | End: 2018-04-17

## 2018-04-12 RX ORDER — METHADONE HYDROCHLORIDE 40 MG/1
165 TABLET ORAL DAILY
Qty: 0 | Refills: 0 | Status: DISCONTINUED | OUTPATIENT
Start: 2018-04-15 | End: 2018-04-19

## 2018-04-12 RX ORDER — LACTULOSE 10 G/15ML
10 SOLUTION ORAL
Qty: 0 | Refills: 0 | Status: DISCONTINUED | OUTPATIENT
Start: 2018-04-12 | End: 2018-04-19

## 2018-04-12 RX ADMIN — Medication 1 PATCH: at 08:27

## 2018-04-12 RX ADMIN — Medication 1 MILLIGRAM(S): at 20:35

## 2018-04-12 RX ADMIN — GENTAMICIN SULFATE 1 DROP(S): 3 SOLUTION/ DROPS OPHTHALMIC at 08:27

## 2018-04-12 RX ADMIN — Medication 1 PATCH: at 08:36

## 2018-04-12 RX ADMIN — GABAPENTIN 600 MILLIGRAM(S): 400 CAPSULE ORAL at 14:36

## 2018-04-12 RX ADMIN — LACTULOSE 10 GRAM(S): 10 SOLUTION ORAL at 11:21

## 2018-04-12 RX ADMIN — LACTULOSE 10 GRAM(S): 10 SOLUTION ORAL at 20:33

## 2018-04-12 RX ADMIN — METHADONE HYDROCHLORIDE 5 MILLIGRAM(S): 40 TABLET ORAL at 08:30

## 2018-04-12 RX ADMIN — MIRTAZAPINE 15 MILLIGRAM(S): 45 TABLET, ORALLY DISINTEGRATING ORAL at 20:36

## 2018-04-12 RX ADMIN — DIVALPROEX SODIUM 750 MILLIGRAM(S): 500 TABLET, DELAYED RELEASE ORAL at 20:34

## 2018-04-12 RX ADMIN — QUETIAPINE FUMARATE 100 MILLIGRAM(S): 200 TABLET, FILM COATED ORAL at 20:34

## 2018-04-12 RX ADMIN — DIVALPROEX SODIUM 500 MILLIGRAM(S): 500 TABLET, DELAYED RELEASE ORAL at 08:26

## 2018-04-12 RX ADMIN — QUETIAPINE FUMARATE 100 MILLIGRAM(S): 200 TABLET, FILM COATED ORAL at 08:26

## 2018-04-12 RX ADMIN — GABAPENTIN 600 MILLIGRAM(S): 400 CAPSULE ORAL at 20:34

## 2018-04-12 RX ADMIN — GABAPENTIN 600 MILLIGRAM(S): 400 CAPSULE ORAL at 08:26

## 2018-04-12 RX ADMIN — METHADONE HYDROCHLORIDE 160 MILLIGRAM(S): 40 TABLET ORAL at 08:30

## 2018-04-12 RX ADMIN — Medication 1 MILLIGRAM(S): at 08:30

## 2018-04-13 RX ORDER — SOD,AMMONIUM,POTASSIUM LACTATE
1 CREAM (GRAM) TOPICAL
Qty: 0 | Refills: 0 | Status: DISCONTINUED | OUTPATIENT
Start: 2018-04-13 | End: 2018-04-19

## 2018-04-13 RX ORDER — TUBERCULIN PURIFIED PROTEIN DERIVATIVE 5 [IU]/.1ML
5 INJECTION, SOLUTION INTRADERMAL ONCE
Qty: 0 | Refills: 0 | Status: DISCONTINUED | OUTPATIENT
Start: 2018-04-13 | End: 2018-04-19

## 2018-04-13 RX ORDER — QUETIAPINE FUMARATE 200 MG/1
50 TABLET, FILM COATED ORAL THREE TIMES A DAY
Qty: 0 | Refills: 0 | Status: DISCONTINUED | OUTPATIENT
Start: 2018-04-13 | End: 2018-04-19

## 2018-04-13 RX ORDER — GABAPENTIN 400 MG/1
800 CAPSULE ORAL THREE TIMES A DAY
Qty: 0 | Refills: 0 | Status: DISCONTINUED | OUTPATIENT
Start: 2018-04-13 | End: 2018-04-19

## 2018-04-13 RX ADMIN — DIVALPROEX SODIUM 750 MILLIGRAM(S): 500 TABLET, DELAYED RELEASE ORAL at 20:29

## 2018-04-13 RX ADMIN — Medication 1 MILLIGRAM(S): at 20:33

## 2018-04-13 RX ADMIN — MIRTAZAPINE 15 MILLIGRAM(S): 45 TABLET, ORALLY DISINTEGRATING ORAL at 21:20

## 2018-04-13 RX ADMIN — QUETIAPINE FUMARATE 50 MILLIGRAM(S): 200 TABLET, FILM COATED ORAL at 14:25

## 2018-04-13 RX ADMIN — QUETIAPINE FUMARATE 100 MILLIGRAM(S): 200 TABLET, FILM COATED ORAL at 20:31

## 2018-04-13 RX ADMIN — Medication 1 PATCH: at 08:13

## 2018-04-13 RX ADMIN — METHADONE HYDROCHLORIDE 5 MILLIGRAM(S): 40 TABLET ORAL at 08:12

## 2018-04-13 RX ADMIN — Medication 1 MILLIGRAM(S): at 08:12

## 2018-04-13 RX ADMIN — METHADONE HYDROCHLORIDE 160 MILLIGRAM(S): 40 TABLET ORAL at 08:12

## 2018-04-13 RX ADMIN — Medication 1 PATCH: at 08:12

## 2018-04-13 RX ADMIN — QUETIAPINE FUMARATE 100 MILLIGRAM(S): 200 TABLET, FILM COATED ORAL at 08:10

## 2018-04-13 RX ADMIN — GABAPENTIN 800 MILLIGRAM(S): 400 CAPSULE ORAL at 20:35

## 2018-04-13 RX ADMIN — GABAPENTIN 600 MILLIGRAM(S): 400 CAPSULE ORAL at 08:10

## 2018-04-13 RX ADMIN — DIVALPROEX SODIUM 750 MILLIGRAM(S): 500 TABLET, DELAYED RELEASE ORAL at 08:10

## 2018-04-13 RX ADMIN — Medication 1 APPLICATION(S): at 21:20

## 2018-04-13 NOTE — CONSULT NOTE ADULT - SUBJECTIVE AND OBJECTIVE BOX
S :   32y year old Male seen at bedside for diabetic risk foot assessment with a chief complaint of dry feet  and preventative foot examination. Patient is medically managed  by psych.  Patient denies any history o f trauma to both feet.  Patient has no other pedal complaints.  Patient is experiencing pain while standing, walking and in shoe gear.      Patient admits to  (-) Fevers, (-) Chills, (-) Nausea, (-) Vomiting, (-) Shortness of Breath (-) calf pain (-) chest pain       PMH: Depression, unspecified depression type  Suicidal ideation    PSH:    Allergies:Haldol (Dystonic RXN)  Talwin (Unknown)      Labs:      WBC Trend  5.13 Date (04-06 @ 21:11)      Chem              T(F): 96.7 (04-13-18 @ 06:16), Max: 97.8 (04-12-18 @ 21:53)  HR: 65 (04-12-18 @ 21:53) (65 - 84)  BP: 98/56 (04-13-18 @ 06:16) (98/56 - 126/65)  RR: 18 (04-13-18 @ 06:16) (18 - 18)  SpO2: --  Wt(kg): --    O:   Integument:  Skin warm, dry and supple bilateral.  No open lesions or inter-digital macerations noted bilateral.   Vascular: Dorsalis Pedis and Posterior Tibial pulses 1/4.  Capillary re-fill time less than 3 seconds digits 1-5 bilateral.   MSK: Muscle strength 5/5 all major muscle groups bilateral. No structural abnormality, bilaterally      A:   Xerosis b/l feet  P: Discussed diagnosis and treatment with patient  Eucerin lotion q12h  Discussed importance of daily foot examinations and proper shoe gear  Please re-consult as needed. S :   32y year old Male seen at bedside for diabetic risk foot assessment with a chief complaint of dry feet  and preventative foot examination. Patient is medically managed  by psych.  Patient denies any history o f trauma to both feet.  Patient has no other pedal complaints.  Patient is experiencing pain while standing, walking and in shoe gear.      Patient admits to  (-) Fevers, (-) Chills, (-) Nausea, (-) Vomiting, (-) Shortness of Breath (-) calf pain (-) chest pain       PMH: Depression, unspecified depression type  Suicidal ideation    PSH:    Allergies:Haldol (Dystonic RXN)  Talwin (Unknown)      Labs:      WBC Trend  5.13 Date (04-06 @ 21:11)      Chem              T(F): 96.7 (04-13-18 @ 06:16), Max: 97.8 (04-12-18 @ 21:53)  HR: 65 (04-12-18 @ 21:53) (65 - 84)  BP: 98/56 (04-13-18 @ 06:16) (98/56 - 126/65)  RR: 18 (04-13-18 @ 06:16) (18 - 18)  SpO2: --  Wt(kg): --    O:   Integument:  Skin warm, dry and supple bilateral.  No open lesions or inter-digital macerations noted bilateral.   Vascular: Dorsalis Pedis and Posterior Tibial pulses 1/4.  Capillary re-fill time less than 3 seconds digits 1-5 bilateral.   MSK: Muscle strength 5/5 all major muscle groups bilateral. No structural abnormality, bilaterally      A:   Xerosis b/l feet  P: Discussed diagnosis and treatment with patient  ammonium lactate 12% lotion q12h  Discussed importance of daily foot examinations and proper shoe gear  Please re-consult as needed.

## 2018-04-13 NOTE — CHART NOTE - NSCHARTNOTEFT_GEN_A_CORE
Patient continues to have an anxious mood and at times can become agitated.  Patient's mother was updated on patient's progress.  Patient's meds may be continued to be adjusted.  Patient still has plan of going to [facility] sometime next week.  Patient has been learning ways to better cope with negative feelings and anger by attending some groups.  This worker will continue to meet with patient daily.

## 2018-04-14 LAB
T PALLIDUM AB TITR SER: NEGATIVE — SIGNIFICANT CHANGE UP
VALPROATE SERPL-MCNC: 55 UG/ML — SIGNIFICANT CHANGE UP (ref 50–100)

## 2018-04-14 RX ADMIN — MIRTAZAPINE 15 MILLIGRAM(S): 45 TABLET, ORALLY DISINTEGRATING ORAL at 20:09

## 2018-04-14 RX ADMIN — GABAPENTIN 800 MILLIGRAM(S): 400 CAPSULE ORAL at 20:09

## 2018-04-14 RX ADMIN — Medication 1 PATCH: at 08:46

## 2018-04-14 RX ADMIN — DIVALPROEX SODIUM 750 MILLIGRAM(S): 500 TABLET, DELAYED RELEASE ORAL at 20:09

## 2018-04-14 RX ADMIN — Medication 1 PATCH: at 07:50

## 2018-04-14 RX ADMIN — Medication 1 MILLIGRAM(S): at 08:43

## 2018-04-14 RX ADMIN — GABAPENTIN 800 MILLIGRAM(S): 400 CAPSULE ORAL at 08:41

## 2018-04-14 RX ADMIN — DIVALPROEX SODIUM 250 MILLIGRAM(S): 500 TABLET, DELAYED RELEASE ORAL at 08:38

## 2018-04-14 RX ADMIN — GABAPENTIN 800 MILLIGRAM(S): 400 CAPSULE ORAL at 13:30

## 2018-04-14 RX ADMIN — METHADONE HYDROCHLORIDE 160 MILLIGRAM(S): 40 TABLET ORAL at 08:02

## 2018-04-14 RX ADMIN — QUETIAPINE FUMARATE 50 MILLIGRAM(S): 200 TABLET, FILM COATED ORAL at 13:30

## 2018-04-14 RX ADMIN — QUETIAPINE FUMARATE 100 MILLIGRAM(S): 200 TABLET, FILM COATED ORAL at 08:39

## 2018-04-14 RX ADMIN — QUETIAPINE FUMARATE 100 MILLIGRAM(S): 200 TABLET, FILM COATED ORAL at 20:09

## 2018-04-14 RX ADMIN — Medication 1 APPLICATION(S): at 08:41

## 2018-04-14 RX ADMIN — LACTULOSE 10 GRAM(S): 10 SOLUTION ORAL at 20:10

## 2018-04-14 RX ADMIN — GENTAMICIN SULFATE 1 DROP(S): 3 SOLUTION/ DROPS OPHTHALMIC at 08:41

## 2018-04-14 RX ADMIN — Medication 1 MILLIGRAM(S): at 20:09

## 2018-04-14 RX ADMIN — METHADONE HYDROCHLORIDE 5 MILLIGRAM(S): 40 TABLET ORAL at 08:03

## 2018-04-14 RX ADMIN — LACTULOSE 10 GRAM(S): 10 SOLUTION ORAL at 08:44

## 2018-04-15 RX ADMIN — QUETIAPINE FUMARATE 50 MILLIGRAM(S): 200 TABLET, FILM COATED ORAL at 12:58

## 2018-04-15 RX ADMIN — LACTULOSE 10 GRAM(S): 10 SOLUTION ORAL at 08:17

## 2018-04-15 RX ADMIN — QUETIAPINE FUMARATE 100 MILLIGRAM(S): 200 TABLET, FILM COATED ORAL at 08:14

## 2018-04-15 RX ADMIN — Medication 1 PATCH: at 08:22

## 2018-04-15 RX ADMIN — GABAPENTIN 800 MILLIGRAM(S): 400 CAPSULE ORAL at 20:05

## 2018-04-15 RX ADMIN — METHADONE HYDROCHLORIDE 165 MILLIGRAM(S): 40 TABLET ORAL at 08:10

## 2018-04-15 RX ADMIN — MIRTAZAPINE 15 MILLIGRAM(S): 45 TABLET, ORALLY DISINTEGRATING ORAL at 20:04

## 2018-04-15 RX ADMIN — LACTULOSE 10 GRAM(S): 10 SOLUTION ORAL at 20:05

## 2018-04-15 RX ADMIN — GABAPENTIN 800 MILLIGRAM(S): 400 CAPSULE ORAL at 12:54

## 2018-04-15 RX ADMIN — GABAPENTIN 800 MILLIGRAM(S): 400 CAPSULE ORAL at 08:16

## 2018-04-15 RX ADMIN — Medication 1 MILLIGRAM(S): at 08:22

## 2018-04-15 RX ADMIN — DIVALPROEX SODIUM 750 MILLIGRAM(S): 500 TABLET, DELAYED RELEASE ORAL at 20:04

## 2018-04-15 RX ADMIN — DIVALPROEX SODIUM 750 MILLIGRAM(S): 500 TABLET, DELAYED RELEASE ORAL at 08:15

## 2018-04-15 RX ADMIN — QUETIAPINE FUMARATE 100 MILLIGRAM(S): 200 TABLET, FILM COATED ORAL at 20:04

## 2018-04-15 RX ADMIN — Medication 1 MILLIGRAM(S): at 20:04

## 2018-04-15 RX ADMIN — Medication 1 APPLICATION(S): at 08:19

## 2018-04-16 RX ORDER — MIRTAZAPINE 45 MG/1
30 TABLET, ORALLY DISINTEGRATING ORAL AT BEDTIME
Qty: 0 | Refills: 0 | Status: DISCONTINUED | OUTPATIENT
Start: 2018-04-16 | End: 2018-04-19

## 2018-04-16 RX ADMIN — QUETIAPINE FUMARATE 50 MILLIGRAM(S): 200 TABLET, FILM COATED ORAL at 12:49

## 2018-04-16 RX ADMIN — QUETIAPINE FUMARATE 100 MILLIGRAM(S): 200 TABLET, FILM COATED ORAL at 20:09

## 2018-04-16 RX ADMIN — METHADONE HYDROCHLORIDE 165 MILLIGRAM(S): 40 TABLET ORAL at 08:05

## 2018-04-16 RX ADMIN — GABAPENTIN 800 MILLIGRAM(S): 400 CAPSULE ORAL at 08:09

## 2018-04-16 RX ADMIN — DIVALPROEX SODIUM 750 MILLIGRAM(S): 500 TABLET, DELAYED RELEASE ORAL at 08:09

## 2018-04-16 RX ADMIN — Medication 1 MILLIGRAM(S): at 20:09

## 2018-04-16 RX ADMIN — GABAPENTIN 800 MILLIGRAM(S): 400 CAPSULE ORAL at 12:45

## 2018-04-16 RX ADMIN — Medication 1 MILLIGRAM(S): at 08:08

## 2018-04-16 RX ADMIN — Medication 1 PATCH: at 08:10

## 2018-04-16 RX ADMIN — LACTULOSE 10 GRAM(S): 10 SOLUTION ORAL at 20:09

## 2018-04-16 RX ADMIN — DIVALPROEX SODIUM 750 MILLIGRAM(S): 500 TABLET, DELAYED RELEASE ORAL at 20:09

## 2018-04-16 RX ADMIN — MIRTAZAPINE 30 MILLIGRAM(S): 45 TABLET, ORALLY DISINTEGRATING ORAL at 20:09

## 2018-04-16 RX ADMIN — QUETIAPINE FUMARATE 100 MILLIGRAM(S): 200 TABLET, FILM COATED ORAL at 08:10

## 2018-04-16 RX ADMIN — GABAPENTIN 800 MILLIGRAM(S): 400 CAPSULE ORAL at 20:09

## 2018-04-16 NOTE — CHART NOTE - NSCHARTNOTEFT_GEN_A_CORE
Patient continues to be somewhat depressed.  Patient can become easily agitated at feels that anything can "set him off".  Patient reports poor sleep but he has a good appetite.  Patient has poor judgment and insight with poor impulse control.  Patient's mother has been updated on patient's progress.  Patient is still not sure if he wants to go to rehab or if he will return to shelter that he is assigned to and outpatient treatment.  This worker will continue to meet with patient daily to provide supportive counseling.

## 2018-04-17 RX ORDER — DIVALPROEX SODIUM 500 MG/1
1000 TABLET, DELAYED RELEASE ORAL AT BEDTIME
Qty: 0 | Refills: 0 | Status: DISCONTINUED | OUTPATIENT
Start: 2018-04-17 | End: 2018-04-19

## 2018-04-17 RX ORDER — DIVALPROEX SODIUM 500 MG/1
750 TABLET, DELAYED RELEASE ORAL DAILY
Qty: 0 | Refills: 0 | Status: DISCONTINUED | OUTPATIENT
Start: 2018-04-17 | End: 2018-04-17

## 2018-04-17 RX ORDER — DIVALPROEX SODIUM 500 MG/1
750 TABLET, DELAYED RELEASE ORAL DAILY
Qty: 0 | Refills: 0 | Status: DISCONTINUED | OUTPATIENT
Start: 2018-04-18 | End: 2018-04-19

## 2018-04-17 RX ADMIN — GABAPENTIN 800 MILLIGRAM(S): 400 CAPSULE ORAL at 13:33

## 2018-04-17 RX ADMIN — LACTULOSE 10 GRAM(S): 10 SOLUTION ORAL at 08:09

## 2018-04-17 RX ADMIN — Medication 1 APPLICATION(S): at 20:11

## 2018-04-17 RX ADMIN — QUETIAPINE FUMARATE 50 MILLIGRAM(S): 200 TABLET, FILM COATED ORAL at 13:36

## 2018-04-17 RX ADMIN — METHADONE HYDROCHLORIDE 165 MILLIGRAM(S): 40 TABLET ORAL at 08:17

## 2018-04-17 RX ADMIN — GABAPENTIN 800 MILLIGRAM(S): 400 CAPSULE ORAL at 20:18

## 2018-04-17 RX ADMIN — QUETIAPINE FUMARATE 100 MILLIGRAM(S): 200 TABLET, FILM COATED ORAL at 20:15

## 2018-04-17 RX ADMIN — LACTULOSE 10 GRAM(S): 10 SOLUTION ORAL at 20:11

## 2018-04-17 RX ADMIN — MIRTAZAPINE 30 MILLIGRAM(S): 45 TABLET, ORALLY DISINTEGRATING ORAL at 20:16

## 2018-04-17 RX ADMIN — Medication 1 MILLIGRAM(S): at 08:08

## 2018-04-17 RX ADMIN — Medication 1 APPLICATION(S): at 08:01

## 2018-04-17 RX ADMIN — GABAPENTIN 800 MILLIGRAM(S): 400 CAPSULE ORAL at 09:20

## 2018-04-17 RX ADMIN — QUETIAPINE FUMARATE 100 MILLIGRAM(S): 200 TABLET, FILM COATED ORAL at 08:04

## 2018-04-17 RX ADMIN — DIVALPROEX SODIUM 1000 MILLIGRAM(S): 500 TABLET, DELAYED RELEASE ORAL at 20:16

## 2018-04-17 RX ADMIN — Medication 1 MILLIGRAM(S): at 20:17

## 2018-04-17 NOTE — DISCHARGE NOTE BEHAVIORAL HEALTH - NSBHDCLABSFT_PSY_A_CORE
FBG, lipid profile, HGBa1c, valproic acid in one month FBG, lipid profile, HGBa1c, LFTs, valproic acid in one month

## 2018-04-17 NOTE — DISCHARGE NOTE BEHAVIORAL HEALTH - CARE PROVIDER_API CALL
Psychiatry,   Psychiatry  [address] [place], [place] [address]  Phone: [phone]  Fax: (   )    -

## 2018-04-17 NOTE — DISCHARGE NOTE BEHAVIORAL HEALTH - PROVIDER TOKENS
FREE:[LAST:[Psychiatry],PHONE:[phone]],FAX:[(   [phone]    -],ADDRESS:[Psychiatry  [address] [place] [address]]]

## 2018-04-17 NOTE — DISCHARGE NOTE BEHAVIORAL HEALTH - HPI (INCLUDE ILLNESS QUALITY, SEVERITY, DURATION, TIMING, CONTEXT, MODIFYING FACTORS, ASSOCIATED SIGNS AND SYMPTOMS)
32 y.o. undomiciled (currently staying in a shelter), single, unemployed male with opioid use disorder, on MMTP (At [facility] [phone]), sedative/hypnotice use disorder, cannibus use disorder and reports pph bipolar disorder, PTSD, remote history of OCD and Tourettes as a child presenting today after being found to have a glass with him at detox and endorsing that he would go into the woods and cut his wrists in an attempt to kill himself. Pt reports that he wanted to write a letter to his mother before killing himself and that is when he was sent to the hospital. Pt was at detox to attempt to be tapered of Methdaone. 32 y.o. undomiciled (currently staying in a shelter), single, unemployed male with opioid use disorder, on MMTP (At [facility] [phone]), sedative/hypnotice use disorder, cannabis use disorder and reports pph bipolar disorder, PTSD, remote history of OCD and Tourettes as a child presenting today after being found to have a glass with him at detox and endorsing that he would go into the woods and cut his wrists in an attempt to kill himself. Pt reports that he wanted to write a letter to his mother before killing himself and that is when he was sent to the hospital. Pt was at detox to attempt to be tapered of Methadone.

## 2018-04-17 NOTE — PROGRESS NOTE BEHAVIORAL HEALTH - DETAILS
+Rash on feet

## 2018-04-17 NOTE — DISCHARGE NOTE BEHAVIORAL HEALTH - NSBHDCSUBSTHXFT_PSY_A_CORE
Pt has long history of substance use- Started using Marijuana at age 13 and attributes to quitting school to drug use (in 8th grade). Reports starting to use heroin at age 16 while in prision. Pt was using IV heroin previously. Pt reports being on benzos for 10 years and taking up to 8mg of Xanax previously and then in the last year being place on Klonopin. States he was previously on 2mg q8 but currently on 1mg q8. Istop checked (Reference # [id number]) and shows pt had his last Klonopin 1mg dose filled on 4/2/18 at [address] [place] [address] Pharmacy and received 90 tablets (30 day supply)- written by Dr. Davy Monzon. Pt states his Klonopin is in the shelter locker and will likely be destroyed. Pt also reports smoking Marijuana 1-2 joints per week. Pt reports 1 prior rehab and several prior detox (In [facility] in 2017) and reports no opioid use other than his MMTP for the last 9 months. States he has gotten xanax of the streets other than what is prescribed to him periodically. Pt reports withdrawal seizures in the past- most recently 6 months ago.

## 2018-04-17 NOTE — DISCHARGE NOTE BEHAVIORAL HEALTH - NSBHDCCONDITIONFT_PSY_A_CORE
Patient's condition improved. Upon discharge the patient is in good behavioral control, and presents no acute management problems. Patient denies and presents no evidence for suicidal or homicidal ideas plans or intentions. Patient is not acutely depressed, manic or psychotic. Patient has been sleeping  well, and reports normal appetite and regular bowel movements.

## 2018-04-17 NOTE — DISCHARGE NOTE BEHAVIORAL HEALTH - MEDICATION SUMMARY - MEDICATIONS TO TAKE
I will START or STAY ON the medications listed below when I get home from the hospital:    methadone 5 mg oral tablet  -- 33 tab(s) by mouth once a day  -- Indication: For MMTP    gabapentin 800 mg oral tablet  -- 1 tab(s) by mouth 3 times a day  -- Indication: For anxiety    clonazePAM 1 mg oral tablet  -- 1 tab(s) by mouth 2 times a day MDD:2mg  -- Indication: For anxiety    divalproex sodium 500 mg oral delayed release tablet  -- 2 tab(s) by mouth once a day (at bedtime)   -- Indication: For Mood stabilization    divalproex sodium 250 mg oral delayed release tablet  -- 3 tab(s) by mouth once a day  -- Indication: For Mood stabilization    mirtazapine 30 mg oral tablet  -- 1 tab(s) by mouth once a day (at bedtime)  -- Indication: For insomnia    QUEtiapine 100 mg oral tablet  -- 1 tab(s) by mouth 2 times a day  -- Indication: For Mood stabilization    lactulose 10 g/15 mL oral syrup  -- 15 milliliter(s) by mouth 2 times a day  -- Indication: For Constipation    nicotine 21 mg/24 hr transdermal film, extended release  -- 21 patch transdermal  -- Indication: For NRT I will START or STAY ON the medications listed below when I get home from the hospital:    methadone 5 mg oral tablet  -- 33 tab(s) by mouth once a day  -- Indication: For MMTP    divalproex sodium 250 mg oral delayed release tablet  -- 3 tab(s) by mouth once a day  -- Indication: For Mood stabilization    divalproex sodium 500 mg oral delayed release tablet  -- 2 tab(s) by mouth once a day (at bedtime)   -- Indication: For Mood stabilization    clonazePAM 1 mg oral tablet  -- 1 tab(s) by mouth 2 times a day MDD:2mg  -- Indication: For anxiety    gabapentin 800 mg oral tablet  -- 1 tab(s) by mouth 3 times a day  -- Indication: For anxiety    mirtazapine 30 mg oral tablet  -- 1 tab(s) by mouth once a day (at bedtime)  -- Indication: For insomnia    QUEtiapine 100 mg oral tablet  -- 1 tab(s) by mouth 2 times a day  -- Indication: For Mood stabilization    lactulose 10 g/15 mL oral syrup  -- 15 milliliter(s) by mouth 2 times a day  -- Indication: For Constipation    nicotine 21 mg/24 hr transdermal film, extended release  -- 21 patch transdermal  -- Indication: For NRT

## 2018-04-17 NOTE — DISCHARGE NOTE BEHAVIORAL HEALTH - CONDITIONS AT DISCHARGE
Rene denies suicidal or homicidal ideations or plan at this time. He states he will be residing in a shelter, and it's safe. He voices no complaints or questions at time of discharge, and understands his follow-up care.
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
## 2018-04-17 NOTE — DISCHARGE NOTE BEHAVIORAL HEALTH - FAMILY HISTORY OF PSYCHIATRIC ILLNESS
32 y.o. undomiciled (currently staying in a shelter), single, unemployed male with h/o bipolar d/o and opioid use disorder, on MMTP (At [place] [phone]),

## 2018-04-17 NOTE — DISCHARGE NOTE BEHAVIORAL HEALTH - NSBHDCRESPONSEFT_PSY_A_CORE
Patient responded well to supportive counselling,  medication treatment, individual and group therapy, psychoeducation and medication education. During this stay in the hospital , the patient showed marked improvement.

## 2018-04-18 RX ORDER — METHADONE HYDROCHLORIDE 40 MG/1
165 TABLET ORAL
Qty: 0 | Refills: 0 | COMMUNITY
Start: 2018-04-18

## 2018-04-18 RX ORDER — GABAPENTIN 400 MG/1
1 CAPSULE ORAL
Qty: 90 | Refills: 0 | OUTPATIENT
Start: 2018-04-18 | End: 2018-05-17

## 2018-04-18 RX ORDER — DIVALPROEX SODIUM 500 MG/1
3 TABLET, DELAYED RELEASE ORAL
Qty: 90 | Refills: 0 | OUTPATIENT
Start: 2018-04-18 | End: 2018-05-17

## 2018-04-18 RX ORDER — CLONAZEPAM 1 MG
1 TABLET ORAL
Qty: 28 | Refills: 0 | OUTPATIENT
Start: 2018-04-18 | End: 2018-05-01

## 2018-04-18 RX ORDER — METHADONE HYDROCHLORIDE 40 MG/1
33 TABLET ORAL
Qty: 0 | Refills: 0 | COMMUNITY
Start: 2018-04-18

## 2018-04-18 RX ORDER — QUETIAPINE FUMARATE 200 MG/1
1 TABLET, FILM COATED ORAL
Qty: 60 | Refills: 0 | OUTPATIENT
Start: 2018-04-18 | End: 2018-05-17

## 2018-04-18 RX ORDER — NICOTINE POLACRILEX 2 MG
21 GUM BUCCAL
Qty: 0 | Refills: 0 | COMMUNITY
Start: 2018-04-18

## 2018-04-18 RX ORDER — DIVALPROEX SODIUM 500 MG/1
2 TABLET, DELAYED RELEASE ORAL
Qty: 60 | Refills: 0 | OUTPATIENT
Start: 2018-04-18 | End: 2018-05-17

## 2018-04-18 RX ORDER — MIRTAZAPINE 45 MG/1
1 TABLET, ORALLY DISINTEGRATING ORAL
Qty: 30 | Refills: 0 | OUTPATIENT
Start: 2018-04-18 | End: 2018-05-17

## 2018-04-18 RX ORDER — LACTULOSE 10 G/15ML
15 SOLUTION ORAL
Qty: 900 | Refills: 0 | OUTPATIENT
Start: 2018-04-18 | End: 2018-05-17

## 2018-04-18 RX ADMIN — QUETIAPINE FUMARATE 100 MILLIGRAM(S): 200 TABLET, FILM COATED ORAL at 21:37

## 2018-04-18 RX ADMIN — Medication 1 MILLIGRAM(S): at 21:38

## 2018-04-18 RX ADMIN — DIVALPROEX SODIUM 1000 MILLIGRAM(S): 500 TABLET, DELAYED RELEASE ORAL at 21:37

## 2018-04-18 RX ADMIN — QUETIAPINE FUMARATE 50 MILLIGRAM(S): 200 TABLET, FILM COATED ORAL at 12:14

## 2018-04-18 RX ADMIN — DIVALPROEX SODIUM 750 MILLIGRAM(S): 500 TABLET, DELAYED RELEASE ORAL at 08:27

## 2018-04-18 RX ADMIN — GABAPENTIN 800 MILLIGRAM(S): 400 CAPSULE ORAL at 21:38

## 2018-04-18 RX ADMIN — Medication 1 APPLICATION(S): at 21:36

## 2018-04-18 RX ADMIN — Medication 1 APPLICATION(S): at 08:28

## 2018-04-18 RX ADMIN — GABAPENTIN 800 MILLIGRAM(S): 400 CAPSULE ORAL at 12:13

## 2018-04-18 RX ADMIN — GABAPENTIN 800 MILLIGRAM(S): 400 CAPSULE ORAL at 08:29

## 2018-04-18 RX ADMIN — Medication 1 PATCH: at 09:20

## 2018-04-18 RX ADMIN — MIRTAZAPINE 30 MILLIGRAM(S): 45 TABLET, ORALLY DISINTEGRATING ORAL at 21:36

## 2018-04-18 RX ADMIN — LACTULOSE 10 GRAM(S): 10 SOLUTION ORAL at 21:38

## 2018-04-18 RX ADMIN — LACTULOSE 10 GRAM(S): 10 SOLUTION ORAL at 08:28

## 2018-04-18 RX ADMIN — QUETIAPINE FUMARATE 100 MILLIGRAM(S): 200 TABLET, FILM COATED ORAL at 08:27

## 2018-04-18 RX ADMIN — METHADONE HYDROCHLORIDE 165 MILLIGRAM(S): 40 TABLET ORAL at 08:25

## 2018-04-18 RX ADMIN — Medication 1 MILLIGRAM(S): at 08:27

## 2018-04-18 NOTE — PROGRESS NOTE BEHAVIORAL HEALTH - AFFECT QUALITY
Irritable/Anxious/Depressed
Depressed/Anxious/Irritable
Depressed/Anxious/Irritable
Anxious/Irritable
Depressed/Irritable/Anxious
Irritable/Depressed/Anxious
Depressed/Anxious
Irritable/Anxious/Euthymic
Depressed/Irritable/Anxious
Depressed/Irritable/Anxious
Irritable/Depressed/Anxious

## 2018-04-18 NOTE — PROGRESS NOTE BEHAVIORAL HEALTH - BODY HABITUS
Well nourished

## 2018-04-18 NOTE — PROGRESS NOTE BEHAVIORAL HEALTH - NSBHCHARTREVIEWLAB_PSY_A_CORE FT
06 Apr 2018 21:11  Sodium 142 [135 - 146]  Chloride 103 [98 - 110]  BUN 13 [10 - 20]  Glucose 94 [70 - 99]  Potassium 4.1 [3.5 - 5.0]  Anion Gap x      Carbon dioxide 30 [17 - 32]  Creatinine 1.0 [0.7 - 1.5]      Ca    8.2<L> [8.5 - 10.1]      06 Apr 2018 21:11        06 Apr 2018 21:11  TPro 6.0 [6.0 - 8.0]  Alb  3.7 [3.5 - 5.2]   TBili <0.2 [0.2 - 1.2]   DBili x       AST  14 [0 - 41]  ALT  11 [0 - 41]   AlkPhos 48 [30 - 115]

## 2018-04-18 NOTE — PROGRESS NOTE BEHAVIORAL HEALTH - NSBHLEGALSTATUS_PSY_A_CORE
9.39 (Emergency)

## 2018-04-18 NOTE — PROGRESS NOTE BEHAVIORAL HEALTH - SECONDARY DX2
Opioid use disorder, mild, on maintenance therapy

## 2018-04-18 NOTE — PROGRESS NOTE BEHAVIORAL HEALTH - NSBHADMITMEDEDUDETAILS_A_CORE FT
depakote and seroquel medication indications, risks, and benefits were discussed

## 2018-04-18 NOTE — PROGRESS NOTE BEHAVIORAL HEALTH - NS ED BHA MSE GENERAL APPEARANCE
Well developed
Other/Well developed
Well developed

## 2018-04-18 NOTE — PROGRESS NOTE BEHAVIORAL HEALTH - PRN MEDS
QUEtiapine   50 milliGRAM(s) Oral (04-17-18 @ 13:36)
LORazepam     Tablet   2 milliGRAM(s) Oral (04-09-18 @ 16:17)
LORazepam     Tablet   2 milliGRAM(s) Oral (04-09-18 @ 16:17)
QUEtiapine   50 milliGRAM(s) Oral (04-17-18 @ 13:36)

## 2018-04-18 NOTE — PROGRESS NOTE BEHAVIORAL HEALTH - NSBHFUPINTERVALHXFT_PSY_A_CORE
Pt has decided against  going to rehab. Continues Remeron to 30 mg po at bedtime. He  denies any suicidal or homicidal ideation, plan or intent.  Pt is currently cooperative, compliant with treatment and responsive to staff verbal redirection. Depakote level is at the lower end of the therapeutic window (50 mcg). will increase dose by 250 mg po/d to Depakote  1750 mg po /d .  Pt' is not willing to stop Klonopin. [facility].
Pt has decided against  going to rehab. He  denies any suicidal or homicidal ideation, plan or intent.  Pt is currently cooperative, compliant with treatment and responsive to staff verbal redirection. Continues Depakote  1750 mg po /d . [facility].
Pt reported  depressed but  denies current  suicidal or homicidal ideation, plan or intent to die at present.  Pt is currently cooperative, compliant with treatment and responsive to staff verbal redirection. Pt interested in a trail of Depakote fro mood stabilization.
Pt reported  depressed but  denies current  suicidal or homicidal ideation, plan or intent to die at present.  Pt is currently cooperative, compliant with treatment and responsive to staff verbal redirection. Pt interested in a trial of Depakote for mood stabilization. Will increase Depakote to 500 mg po bid . Will add Seroquel 100 mg po prn severa gitation.
Pt reports feeling better and   denies current  suicidal or homicidal ideation, plan or intent to die at present.  Pt is currently cooperative, compliant with treatment and responsive to staff verbal redirection. , interactive with staff
Pt reported  to start feeling better, less depressed and denies any suicidal or homicidal ideation, plan or intent to die at present.  Pt is currently cooperative, compliant with treatment and responsive to staff verbal redirection.
Pt reports feeling better and   denies current  suicidal or homicidal ideation, plan or intent to die at present.  Pt is currently cooperative, compliant with treatment and responsive to staff verbal redirection. . Will add Seroquel 100 mg po prn sever agitation. Will nicerase gabapentin to 800 ,g po tid fro anxiety. Pt is interested in going to rehab and possibly to care home house. Pt was educated that he will need to stop Klonopin to go to rehab. Seroquel 50 mg po tid prn agitation was started. . We discussed  this issue.
Pt reports feeling better and   denies current  suicidal or homicidal ideation, plan or intent to die at present.  Pt is currently cooperative, compliant with treatment and responsive to staff verbal redirection. Will increase Depakote to 750 mg po bid . Will add Seroquel 100 mg po prn sever agitation. Pt is interested in going to rehab and possibly to custodial house. Pt's Klonopin tx may represent  an obstacle to admission to some residences. We discussed  this issue. Pt c/o constipation,; lactulose was added to his regimen.
Pt reports feeling better and   denies current  suicidal or homicidal ideation, plan or intent to die at present.  Pt is currently cooperative, compliant with treatment and responsive to staff verbal redirection. , interactive with staff
Pt reports feeling somewhat depressed and not sure about going to rehab. Will increase remeron to 30 mg po at bedtime. He  denies any suicidal or homicidal ideation, plan or intent.  Pt is currently cooperative, compliant with treatment and responsive to staff verbal redirection. Continues Depakote  750 mg po bid . Will add Seroquel 100 mg po prn sever agitation. Pt is interested in going to rehab and possibly to [facility]. Pt's will have to stop Klonopin before going to rehab.
Pt reports feeling better and   denies current  suicidal or homicidal ideation, plan or intent to die at present.  Pt is currently cooperative, compliant with treatment and responsive to staff verbal redirection. Pt interested in a trial of Depakote for mood stabilization. Will increase Depakote to 500 mg po bid . Will add Seroquel 100 mg po prn sever agitation. [facility] Pt's Klonopin tx may represent  an obstacle to admission to some residences. We discussed  this issue.

## 2018-04-18 NOTE — PROGRESS NOTE BEHAVIORAL HEALTH - NSBHADMITDANGERSELF_PSY_A_CORE
unable to care for self
suicidal ideation with plan and means
unable to care for self
suicidal ideation with plan and means

## 2018-04-18 NOTE — PROGRESS NOTE BEHAVIORAL HEALTH - AXIS III
Tinea pedis

## 2018-04-18 NOTE — PROGRESS NOTE BEHAVIORAL HEALTH - THOUGHT PROCESS
Linear/Perseverative
Linear/Perseverative
Perseverative/Linear
Linear/Perseverative
Perseverative/Linear
Linear/Perseverative
Perseverative/Linear
Linear/Perseverative
Perseverative/Linear

## 2018-04-18 NOTE — PROGRESS NOTE BEHAVIORAL HEALTH - SECONDARY DX3
Sedative hypnotic or anxiolytic dependence

## 2018-04-18 NOTE — PROGRESS NOTE BEHAVIORAL HEALTH - HYGIENE
Fair/Poor
Good/Poor
Fair/Poor
Fair/Poor
Good/Poor
Good/Poor
Fair/Poor
Good/Poor
Good/Poor
Poor/Good
Poor/Good

## 2018-04-18 NOTE — PROGRESS NOTE BEHAVIORAL HEALTH - AFFECT RANGE
Constricted

## 2018-04-18 NOTE — PROGRESS NOTE BEHAVIORAL HEALTH - ESTIMATED DISCHARGE DATE
20-Apr-2018

## 2018-04-18 NOTE — PROGRESS NOTE BEHAVIORAL HEALTH - THOUGHT CONTENT
Unremarkable
Suicidality/Unremarkable
Unremarkable
Unremarkable/Suicidality
Unremarkable/Suicidality
Unremarkable

## 2018-04-18 NOTE — PROGRESS NOTE BEHAVIORAL HEALTH - GROOMING
Fair/Poor
Good/Poor
Fair/Poor
Good/Poor
Poor/Fair
Good/Poor
Fair/Poor
Good/Poor
Poor/Good
Poor/Good
Good/Poor

## 2018-04-18 NOTE — PROGRESS NOTE BEHAVIORAL HEALTH - MOOD
Irritable/Anxious
Other/Irritable/Anxious
Anxious/Irritable
Depressed/Other/Anxious/Irritable
Other/Irritable/Depressed/Anxious
Normal/Anxious/Irritable
Depressed/Other
Irritable/Normal
Anxious/Irritable
Anxious/Irritable
Anxious/Irritable/Normal

## 2018-04-18 NOTE — PROGRESS NOTE BEHAVIORAL HEALTH - SECONDARY DX1
Methadone maintenance therapy patient

## 2018-04-18 NOTE — PROGRESS NOTE BEHAVIORAL HEALTH - NSBHADMITIPOBSFT_PSY_A_CORE
clinically indicated
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
## 2018-04-18 NOTE — PROGRESS NOTE BEHAVIORAL HEALTH - PROBLEM SELECTOR PROBLEM 2
Methadone maintenance therapy patient

## 2018-04-18 NOTE — PROGRESS NOTE BEHAVIORAL HEALTH - PRIMARY DX
Bipolar affective disorder, currently depressed, moderate

## 2018-04-18 NOTE — PROGRESS NOTE BEHAVIORAL HEALTH - NSBHCHARTREVIEWVS_PSY_A_CORE FT
T(C): 36.9 (04-18-18 @ 10:20), Max: 36.9 (04-18-18 @ 10:20)  HR: 89 (04-18-18 @ 10:20) (63 - 89)  BP: 138/91 (04-18-18 @ 10:20) (116/65 - 138/91)  RR: 18 (04-18-18 @ 10:20) (16 - 18)  SpO2: --
T(C): 36.2 (04-09-18 @ 14:15), Max: 36.2 (04-09-18 @ 14:15)  HR: 77 (04-09-18 @ 14:15) (65 - 86)  BP: 149/54 (04-09-18 @ 14:15) (108/58 - 149/54)  RR: 18 (04-09-18 @ 14:15) (16 - 18)  SpO2: --
T(C): 36.6 (04-10-18 @ 10:49), Max: 37 (04-09-18 @ 20:35)  HR: 74 (04-10-18 @ 10:49) (68 - 77)  BP: 111/64 (04-10-18 @ 10:49) (111/64 - 149/54)  RR: 17 (04-10-18 @ 10:49) (17 - 18)  SpO2: --
T(C): 36.6 (04-17-18 @ 10:00), Max: 36.6 (04-17-18 @ 10:00)  HR: 84 (04-17-18 @ 10:00) (76 - 92)  BP: 121/62 (04-17-18 @ 10:00) (112/58 - 121/62)  RR: 16 (04-17-18 @ 10:00) (16 - 20)  SpO2: --
T(C): 36.8 (04-13-18 @ 10:53), Max: 36.8 (04-13-18 @ 10:53)  HR: 88 (04-13-18 @ 10:53) (65 - 88)  BP: 116/75 (04-13-18 @ 10:53) (98/56 - 116/75)  RR: 18 (04-13-18 @ 10:53) (18 - 18)  SpO2: --
T(C): 36.4 (04-12-18 @ 10:36), Max: 36.4 (04-12-18 @ 10:36)  HR: 84 (04-12-18 @ 10:36) (53 - 84)  BP: 126/65 (04-12-18 @ 10:36) (101/69 - 126/65)  RR: 18 (04-12-18 @ 10:36) (18 - 18)  SpO2: --
T(C): 36.8 (04-13-18 @ 10:53), Max: 36.8 (04-13-18 @ 10:53)  HR: 88 (04-13-18 @ 10:53) (65 - 88)  BP: 116/75 (04-13-18 @ 10:53) (98/56 - 116/75)  RR: 18 (04-13-18 @ 10:53) (18 - 18)  SpO2: --
Vital Signs Last 24 Hrs  T(C): 36.3 (15 Apr 2018 06:22), Max: 36.5 (14 Apr 2018 18:10)  T(F): 97.3 (15 Apr 2018 06:22), Max: 97.7 (14 Apr 2018 18:10)  HR: 61 (15 Apr 2018 06:22) (61 - 103)  BP: 106/56 (15 Apr 2018 06:22) (106/56 - 122/64)  BP(mean): --  RR: 17 (15 Apr 2018 06:22) (17 - 18)  SpO2: --
T(C): 36.9 (04-16-18 @ 10:43), Max: 36.9 (04-16-18 @ 10:43)  HR: 108 (04-16-18 @ 10:43) (59 - 108)  BP: 128/71 (04-16-18 @ 10:43) (117/65 - 128/71)  RR: 18 (04-16-18 @ 10:43) (16 - 18)  SpO2: --
T(C): 36.9 (04-11-18 @ 10:06), Max: 36.9 (04-11-18 @ 10:06)  HR: 75 (04-11-18 @ 10:06) (62 - 75)  BP: 104/74 (04-11-18 @ 10:06) (104/74 - 120/63)  RR: 18 (04-11-18 @ 10:06) (18 - 18)  SpO2: --

## 2018-04-18 NOTE — PROGRESS NOTE BEHAVIORAL HEALTH - PROBLEM SELECTOR PROBLEM 1
Bipolar affective disorder, currently depressed, moderate

## 2018-04-19 VITALS
TEMPERATURE: 98 F | RESPIRATION RATE: 16 BRPM | SYSTOLIC BLOOD PRESSURE: 114 MMHG | DIASTOLIC BLOOD PRESSURE: 55 MMHG | HEART RATE: 60 BPM

## 2018-04-19 RX ORDER — MIRTAZAPINE 45 MG/1
1 TABLET, ORALLY DISINTEGRATING ORAL
Qty: 30 | Refills: 0 | OUTPATIENT
Start: 2018-04-19 | End: 2018-05-18

## 2018-04-19 RX ORDER — QUETIAPINE FUMARATE 200 MG/1
1 TABLET, FILM COATED ORAL
Qty: 60 | Refills: 0 | OUTPATIENT
Start: 2018-04-19 | End: 2018-05-18

## 2018-04-19 RX ORDER — LACTULOSE 10 G/15ML
15 SOLUTION ORAL
Qty: 900 | Refills: 0 | OUTPATIENT
Start: 2018-04-19 | End: 2018-05-18

## 2018-04-19 RX ORDER — CLONAZEPAM 1 MG
1 TABLET ORAL
Qty: 28 | Refills: 0 | OUTPATIENT
Start: 2018-04-19 | End: 2018-05-02

## 2018-04-19 RX ORDER — GABAPENTIN 400 MG/1
1 CAPSULE ORAL
Qty: 90 | Refills: 0 | OUTPATIENT
Start: 2018-04-19 | End: 2018-05-18

## 2018-04-19 RX ORDER — CLONAZEPAM 1 MG
1 TABLET ORAL
Qty: 0 | Refills: 0 | Status: DISCONTINUED | OUTPATIENT
Start: 2018-04-19 | End: 2018-04-19

## 2018-04-19 RX ORDER — DIVALPROEX SODIUM 500 MG/1
3 TABLET, DELAYED RELEASE ORAL
Qty: 90 | Refills: 0 | OUTPATIENT
Start: 2018-04-19 | End: 2018-05-18

## 2018-04-19 RX ORDER — DIVALPROEX SODIUM 500 MG/1
2 TABLET, DELAYED RELEASE ORAL
Qty: 60 | Refills: 0 | OUTPATIENT
Start: 2018-04-19 | End: 2018-05-18

## 2018-04-19 RX ADMIN — Medication 1 MILLIGRAM(S): at 09:12

## 2018-04-19 RX ADMIN — METHADONE HYDROCHLORIDE 165 MILLIGRAM(S): 40 TABLET ORAL at 08:04

## 2018-04-19 RX ADMIN — GABAPENTIN 800 MILLIGRAM(S): 400 CAPSULE ORAL at 09:00

## 2018-04-19 RX ADMIN — DIVALPROEX SODIUM 750 MILLIGRAM(S): 500 TABLET, DELAYED RELEASE ORAL at 08:59

## 2018-04-19 RX ADMIN — QUETIAPINE FUMARATE 100 MILLIGRAM(S): 200 TABLET, FILM COATED ORAL at 08:59

## 2018-04-19 NOTE — PROGRESS NOTE ADULT - PROBLEM SELECTOR PROBLEM 1
Depression, unspecified depression type
Bipolar affective disorder, currently depressed, moderate
Depression, unspecified depression type

## 2018-04-19 NOTE — PROGRESS NOTE ADULT - SUBJECTIVE AND OBJECTIVE BOX
pt stable alert in NAD  no new complaints    SUICIDAL IDEATION  ^SUICIDAL IDEATION  No h/o HF  No pertinent family history in first degree relatives  MEWS Score  Depression, unspecified depression type  Suicidal ideation  Bipolar affective disorder, currently depressed, moderate  Suicidal ideation  Methadone maintenance therapy patient  Wrist contusion  Conjunctival abrasion  Depression, unspecified depression type  Suicidal ideation  Deferred condition on axis II  Cigarette nicotine dependence without complication  Sedative hypnotic or anxiolytic dependence  Opioid use disorder, mild, on maintenance therapy  Bipolar affective disorder, currently depressed, moderate  SUICIDAL IDEATION  90+  Sedative hypnotic or anxiolytic dependence  Methadone maintenance therapy patient    HEALTH ISSUES - PROBLEM Dx:  Methadone maintenance therapy patient: Methadone maintenance therapy patient  Wrist contusion: Wrist contusion  Conjunctival abrasion: Conjunctival abrasion  Depression, unspecified depression type: Depression, unspecified depression type  Suicidal ideation: Suicidal ideation  Deferred condition on axis II  Cigarette nicotine dependence without complication  Sedative hypnotic or anxiolytic dependence  Opioid use disorder, mild, on maintenance therapy  Bipolar affective disorder, currently depressed, moderate        PAST MEDICAL & SURGICAL HISTORY:  Depression, unspecified depression type  Suicidal ideation    Haldol (Dystonic RXN)  Talwin (Unknown)      FAMILY HISTORY:  No pertinent family history in first degree relatives      ammonium lactate 12% Lotion 1 Application(s) Topical two times a day  diVALproex DR 1000 milliGRAM(s) Oral at bedtime  diVALproex  milliGRAM(s) Oral daily  gabapentin 800 milliGRAM(s) Oral three times a day  lactulose Syrup 10 Gram(s) Oral two times a day  methadone    Tablet 165 milliGRAM(s) Oral daily  mirtazapine 30 milliGRAM(s) Oral at bedtime  nicotine - 21 mG/24Hr(s) Patch 1 patch Transdermal daily  PPD  5 Tuberculin Unit(s) Injectable 5 Unit(s) IntraDermal once  QUEtiapine 50 milliGRAM(s) Oral three times a day PRN  QUEtiapine 100 milliGRAM(s) Oral two times a day      T(C): 36.6 (04-19-18 @ 06:15), Max: 36.9 (04-18-18 @ 10:20)  HR: 60 (04-19-18 @ 06:15) (60 - 89)  BP: 114/55 (04-19-18 @ 06:15) (114/55 - 138/91)  RR: 16 (04-19-18 @ 06:15) (16 - 18)  SpO2: --    PE;  general:  unchanged and stable   eye im,proved  Lungs:    Heart:    EXT:    Neuro:  no deficits                        CAPILLARY BLOOD GLUCOSE
pt stable alert in NAD  no new complaints    SUICIDAL IDEATION  ^SUICIDAL IDEATION  No h/o HF  No pertinent family history in first degree relatives  MEWS Score  Depression, unspecified depression type  Suicidal ideation  Suicidal ideation  Methadone maintenance therapy patient  Wrist contusion  Conjunctival abrasion  Depression, unspecified depression type  Suicidal ideation  Deferred condition on axis II  Cigarette nicotine dependence without complication  Sedative hypnotic or anxiolytic dependence  Opioid use disorder, mild, on maintenance therapy  Bipolar affective disorder, currently depressed, moderate  SUICIDAL IDEATION  90+    HEALTH ISSUES - PROBLEM Dx:  Methadone maintenance therapy patient: Methadone maintenance therapy patient  Wrist contusion: Wrist contusion  Conjunctival abrasion: Conjunctival abrasion  Depression, unspecified depression type: Depression, unspecified depression type  Suicidal ideation: Suicidal ideation  Deferred condition on axis II  Cigarette nicotine dependence without complication  Sedative hypnotic or anxiolytic dependence  Opioid use disorder, mild, on maintenance therapy  Bipolar affective disorder, currently depressed, moderate        PAST MEDICAL & SURGICAL HISTORY:  Depression, unspecified depression type  Suicidal ideation    Haldol (Dystonic RXN)  Talwin (Unknown)      FAMILY HISTORY:  No pertinent family history in first degree relatives      ammonium lactate 12% Lotion 1 Application(s) Topical two times a day  clonazePAM Tablet 1 milliGRAM(s) Oral two times a day  diVALproex  milliGRAM(s) Oral two times a day  gabapentin 800 milliGRAM(s) Oral three times a day  lactulose Syrup 10 Gram(s) Oral two times a day  methadone    Tablet 165 milliGRAM(s) Oral daily  mirtazapine 30 milliGRAM(s) Oral at bedtime  nicotine - 21 mG/24Hr(s) Patch 1 patch Transdermal daily  PPD  5 Tuberculin Unit(s) Injectable 5 Unit(s) IntraDermal once  QUEtiapine 50 milliGRAM(s) Oral three times a day PRN  QUEtiapine 100 milliGRAM(s) Oral two times a day      T(C): 36.2 (04-17-18 @ 05:53), Max: 36.9 (04-16-18 @ 10:43)  HR: 76 (04-17-18 @ 05:53) (76 - 108)  BP: 112/58 (04-17-18 @ 05:53) (112/58 - 128/71)  RR: 17 (04-17-18 @ 05:53) (17 - 20)  SpO2: --    PE;  general:  stable  no acute changes  Lungs:    Heart:    EXT:    Neuro:  no defcits                        CAPILLARY BLOOD GLUCOSE
pt stable alert in NAD  no new complaints    SUICIDAL IDEATION  ^SUICIDAL IDEATION  No h/o HF  No pertinent family history in first degree relatives  MEWS Score  Depression, unspecified depression type  Suicidal ideation  Suicidal ideation  Methadone maintenance therapy patient  Wrist contusion  Conjunctival abrasion  Depression, unspecified depression type  Suicidal ideation  Deferred condition on axis II  Cigarette nicotine dependence without complication  Sedative hypnotic or anxiolytic dependence  Opioid use disorder, mild, on maintenance therapy  Bipolar affective disorder, currently depressed, moderate  SUICIDAL IDEATION  90+    HEALTH ISSUES - PROBLEM Dx:  Methadone maintenance therapy patient: Methadone maintenance therapy patient  Wrist contusion: Wrist contusion  Conjunctival abrasion: Conjunctival abrasion  Depression, unspecified depression type: Depression, unspecified depression type  Suicidal ideation: Suicidal ideation  Deferred condition on axis II  Cigarette nicotine dependence without complication  Sedative hypnotic or anxiolytic dependence  Opioid use disorder, mild, on maintenance therapy  Bipolar affective disorder, currently depressed, moderate        PAST MEDICAL & SURGICAL HISTORY:  Depression, unspecified depression type  Suicidal ideation    Haldol (Dystonic RXN)  Talwin (Unknown)      FAMILY HISTORY:  No pertinent family history in first degree relatives      clonazePAM Tablet 0.5 milliGRAM(s) Oral daily  clonazePAM Tablet 0.5 milliGRAM(s) Oral <User Schedule>  clonazePAM Tablet 1 milliGRAM(s) Oral at bedtime  diVALproex  milliGRAM(s) Oral two times a day  gabapentin 600 milliGRAM(s) Oral three times a day  gentamicin 0.3% Ophthalmic Solution 1 Drop(s) Both EYES daily  methadone    Tablet 160 milliGRAM(s) Oral daily  methadone    Tablet 5 milliGRAM(s) Oral daily  mirtazapine 15 milliGRAM(s) Oral at bedtime  nicotine - 21 mG/24Hr(s) Patch 1 patch Transdermal daily  QUEtiapine 100 milliGRAM(s) Oral two times a day      T(C): 36.7 (04-11-18 @ 05:55), Max: 36.7 (04-11-18 @ 05:55)  HR: 62 (04-11-18 @ 05:55) (62 - 74)  BP: 120/63 (04-11-18 @ 05:55) (111/64 - 120/63)  RR: 18 (04-11-18 @ 05:55) (17 - 18)  SpO2: --    PE;  general:  stabel   resolving subconjuntivla hematoma left eye  Lungs:    Heart:    EXT:    Neuro: no defitis                          CAPILLARY BLOOD GLUCOSE

## 2018-04-19 NOTE — PROGRESS NOTE ADULT - ASSESSMENT
medically stable with no acute issues

## 2018-04-23 DIAGNOSIS — H11.32 CONJUNCTIVAL HEMORRHAGE, LEFT EYE: ICD-10-CM

## 2018-04-23 DIAGNOSIS — B35.3 TINEA PEDIS: ICD-10-CM

## 2018-04-23 DIAGNOSIS — F13.20 SEDATIVE, HYPNOTIC OR ANXIOLYTIC DEPENDENCE, UNCOMPLICATED: ICD-10-CM

## 2018-04-23 DIAGNOSIS — R45.851 SUICIDAL IDEATIONS: ICD-10-CM

## 2018-04-23 DIAGNOSIS — F31.32 BIPOLAR DISORDER, CURRENT EPISODE DEPRESSED, MODERATE: ICD-10-CM

## 2018-04-23 DIAGNOSIS — Z78.1 PHYSICAL RESTRAINT STATUS: ICD-10-CM
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
## 2018-04-23 DIAGNOSIS — F11.20 OPIOID DEPENDENCE, UNCOMPLICATED: ICD-10-CM

## 2018-04-23 DIAGNOSIS — Z56.0 UNEMPLOYMENT, UNSPECIFIED: ICD-10-CM

## 2018-04-23 DIAGNOSIS — S60.212S CONTUSION OF LEFT WRIST, SEQUELA: ICD-10-CM

## 2018-04-23 SDOH — ECONOMIC STABILITY - INCOME SECURITY: UNEMPLOYMENT, UNSPECIFIED: Z56.0

## 2018-04-25 ENCOUNTER — INPATIENT (INPATIENT)
Facility: HOSPITAL | Age: 33
LOS: 2 days | Discharge: NURSING, REHAB OR LONG-TERM CARE | End: 2018-04-28
Attending: INTERNAL MEDICINE | Admitting: INTERNAL MEDICINE
Payer: MEDICAID

## 2018-04-25 VITALS
OXYGEN SATURATION: 98 % | DIASTOLIC BLOOD PRESSURE: 84 MMHG | TEMPERATURE: 102 F | HEIGHT: 74 IN | RESPIRATION RATE: 18 BRPM | HEART RATE: 95 BPM | WEIGHT: 244.93 LBS | SYSTOLIC BLOOD PRESSURE: 137 MMHG

## 2018-04-25 DIAGNOSIS — L03.119 CELLULITIS OF UNSPECIFIED PART OF LIMB: ICD-10-CM

## 2018-04-25 DIAGNOSIS — F17.210 NICOTINE DEPENDENCE, CIGARETTES, UNCOMPLICATED: ICD-10-CM
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
## 2018-04-25 DIAGNOSIS — B36.9 SUPERFICIAL MYCOSIS, UNSPECIFIED: ICD-10-CM

## 2018-04-25 DIAGNOSIS — F15.90 OTHER STIMULANT USE, UNSPECIFIED, UNCOMPLICATED: ICD-10-CM

## 2018-04-25 DIAGNOSIS — R45.851 SUICIDAL IDEATIONS: ICD-10-CM

## 2018-04-25 DIAGNOSIS — Z91.5 PERSONAL HISTORY OF SELF-HARM: ICD-10-CM

## 2018-04-25 DIAGNOSIS — F10.10 ALCOHOL ABUSE, UNCOMPLICATED: ICD-10-CM

## 2018-04-25 DIAGNOSIS — A41.9 SEPSIS, UNSPECIFIED ORGANISM: ICD-10-CM
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
## 2018-04-25 DIAGNOSIS — L03.90 CELLULITIS, UNSPECIFIED: ICD-10-CM

## 2018-04-25 DIAGNOSIS — F11.90 OPIOID USE, UNSPECIFIED, UNCOMPLICATED: ICD-10-CM
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
## 2018-04-25 DIAGNOSIS — T31.0 BURNS INVOLVING LESS THAN 10% OF BODY SURFACE: ICD-10-CM

## 2018-04-25 DIAGNOSIS — F13.19 SEDATIVE, HYPNOTIC OR ANXIOLYTIC ABUSE WITH UNSPECIFIED SEDATIVE, HYPNOTIC OR ANXIOLYTIC-INDUCED DISORDER: ICD-10-CM

## 2018-04-25 DIAGNOSIS — F32.9 MAJOR DEPRESSIVE DISORDER, SINGLE EPISODE, UNSPECIFIED: ICD-10-CM

## 2018-04-25 DIAGNOSIS — F43.10 POST-TRAUMATIC STRESS DISORDER, UNSPECIFIED: ICD-10-CM

## 2018-04-25 DIAGNOSIS — T21.22XA BURN OF SECOND DEGREE OF ABDOMINAL WALL, INITIAL ENCOUNTER: ICD-10-CM

## 2018-04-25 DIAGNOSIS — Z59.0 HOMELESSNESS: ICD-10-CM

## 2018-04-25 DIAGNOSIS — F19.10 OTHER PSYCHOACTIVE SUBSTANCE ABUSE, UNCOMPLICATED: ICD-10-CM
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
## 2018-04-25 PROBLEM — Z00.00 ENCOUNTER FOR PREVENTIVE HEALTH EXAMINATION: Status: ACTIVE | Noted: 2018-04-25

## 2018-04-25 LAB
ALBUMIN SERPL ELPH-MCNC: 3.9 G/DL — SIGNIFICANT CHANGE UP (ref 3.5–5.2)
ALP SERPL-CCNC: 48 U/L — SIGNIFICANT CHANGE UP (ref 30–115)
ALT FLD-CCNC: 19 U/L — SIGNIFICANT CHANGE UP (ref 0–41)
ANION GAP SERPL CALC-SCNC: 10 MMOL/L — SIGNIFICANT CHANGE UP (ref 7–14)
APAP SERPL-MCNC: <5 UG/ML — LOW (ref 10–30)
APPEARANCE UR: CLEAR — SIGNIFICANT CHANGE UP
APTT BLD: 30.2 SEC — SIGNIFICANT CHANGE UP (ref 27–39.2)
AST SERPL-CCNC: 20 U/L — SIGNIFICANT CHANGE UP (ref 0–41)
BACTERIA # UR AUTO: (no result)
BASE EXCESS BLDV CALC-SCNC: 3.7 MMOL/L — HIGH (ref -2–2)
BASOPHILS # BLD AUTO: 0.03 K/UL — SIGNIFICANT CHANGE UP (ref 0–0.2)
BASOPHILS NFR BLD AUTO: 0.3 % — SIGNIFICANT CHANGE UP (ref 0–1)
BILIRUB SERPL-MCNC: 0.2 MG/DL — SIGNIFICANT CHANGE UP (ref 0.2–1.2)
BILIRUB UR-MCNC: (no result)
BUN SERPL-MCNC: 12 MG/DL — SIGNIFICANT CHANGE UP (ref 10–20)
CA-I SERPL-SCNC: 1.15 MMOL/L — SIGNIFICANT CHANGE UP (ref 1.12–1.3)
CALCIUM SERPL-MCNC: 8.6 MG/DL — SIGNIFICANT CHANGE UP (ref 8.5–10.1)
CHLORIDE SERPL-SCNC: 102 MMOL/L — SIGNIFICANT CHANGE UP (ref 98–110)
CO2 SERPL-SCNC: 30 MMOL/L — SIGNIFICANT CHANGE UP (ref 17–32)
COLOR SPEC: YELLOW — SIGNIFICANT CHANGE UP
CREAT SERPL-MCNC: 0.9 MG/DL — SIGNIFICANT CHANGE UP (ref 0.7–1.5)
DIFF PNL FLD: NEGATIVE — SIGNIFICANT CHANGE UP
EOSINOPHIL # BLD AUTO: 0.03 K/UL — SIGNIFICANT CHANGE UP (ref 0–0.7)
EOSINOPHIL NFR BLD AUTO: 0.3 % — SIGNIFICANT CHANGE UP (ref 0–8)
ETHANOL SERPL-MCNC: <10 MG/DL — HIGH
GAS PNL BLDV: 140 MMOL/L — SIGNIFICANT CHANGE UP (ref 136–145)
GAS PNL BLDV: SIGNIFICANT CHANGE UP
GLUCOSE SERPL-MCNC: 81 MG/DL — SIGNIFICANT CHANGE UP (ref 70–99)
GLUCOSE UR QL: NEGATIVE MG/DL — SIGNIFICANT CHANGE UP
HCO3 BLDV-SCNC: 30 MMOL/L — HIGH (ref 22–29)
HCT VFR BLD CALC: 31.6 % — LOW (ref 42–52)
HCT VFR BLDA CALC: 46.2 % — HIGH (ref 34–44)
HGB BLD CALC-MCNC: 15.1 G/DL — SIGNIFICANT CHANGE UP (ref 14–18)
HGB BLD-MCNC: 10.6 G/DL — LOW (ref 14–18)
HOROWITZ INDEX BLDV+IHG-RTO: 21 — SIGNIFICANT CHANGE UP
IMM GRANULOCYTES NFR BLD AUTO: 0.3 % — SIGNIFICANT CHANGE UP (ref 0.1–0.3)
INR BLD: 1.1 RATIO — SIGNIFICANT CHANGE UP (ref 0.65–1.3)
KETONES UR-MCNC: (no result)
LACTATE BLDV-MCNC: 0.9 MMOL/L — SIGNIFICANT CHANGE UP (ref 0.5–1.6)
LEUKOCYTE ESTERASE UR-ACNC: NEGATIVE — SIGNIFICANT CHANGE UP
LYMPHOCYTES # BLD AUTO: 0.85 K/UL — LOW (ref 1.2–3.4)
LYMPHOCYTES # BLD AUTO: 8.8 % — LOW (ref 20.5–51.1)
MAGNESIUM SERPL-MCNC: 1.9 MG/DL — SIGNIFICANT CHANGE UP (ref 1.8–2.4)
MCHC RBC-ENTMCNC: 30.4 PG — SIGNIFICANT CHANGE UP (ref 27–31)
MCHC RBC-ENTMCNC: 33.5 G/DL — SIGNIFICANT CHANGE UP (ref 32–37)
MCV RBC AUTO: 90.5 FL — SIGNIFICANT CHANGE UP (ref 80–94)
MONOCYTES # BLD AUTO: 0.82 K/UL — HIGH (ref 0.1–0.6)
MONOCYTES NFR BLD AUTO: 8.5 % — SIGNIFICANT CHANGE UP (ref 1.7–9.3)
NEUTROPHILS # BLD AUTO: 7.91 K/UL — HIGH (ref 1.4–6.5)
NEUTROPHILS NFR BLD AUTO: 81.8 % — HIGH (ref 42.2–75.2)
NITRITE UR-MCNC: NEGATIVE — SIGNIFICANT CHANGE UP
NRBC # BLD: 0 /100 WBCS — SIGNIFICANT CHANGE UP (ref 0–0)
PCO2 BLDV: 52 MMHG — HIGH (ref 41–51)
PH BLDV: 7.37 — SIGNIFICANT CHANGE UP (ref 7.26–7.43)
PH UR: 6.5 — SIGNIFICANT CHANGE UP (ref 5–8)
PLATELET # BLD AUTO: 227 K/UL — SIGNIFICANT CHANGE UP (ref 130–400)
PO2 BLDV: 29 MMHG — SIGNIFICANT CHANGE UP (ref 20–40)
POTASSIUM BLDV-SCNC: 3.8 MMOL/L — SIGNIFICANT CHANGE UP (ref 3.3–5.6)
POTASSIUM SERPL-MCNC: 4.2 MMOL/L — SIGNIFICANT CHANGE UP (ref 3.5–5)
POTASSIUM SERPL-SCNC: 4.2 MMOL/L — SIGNIFICANT CHANGE UP (ref 3.5–5)
PROT SERPL-MCNC: 6.4 G/DL — SIGNIFICANT CHANGE UP (ref 6–8)
PROT UR-MCNC: 30 MG/DL
PROTHROM AB SERPL-ACNC: 11.9 SEC — SIGNIFICANT CHANGE UP (ref 9.95–12.87)
RBC # BLD: 3.49 M/UL — LOW (ref 4.7–6.1)
RBC # FLD: 12.9 % — SIGNIFICANT CHANGE UP (ref 11.5–14.5)
SALICYLATES SERPL-MCNC: <0.3 MG/DL — LOW (ref 4–30)
SAO2 % BLDV: 57 % — SIGNIFICANT CHANGE UP
SODIUM SERPL-SCNC: 142 MMOL/L — SIGNIFICANT CHANGE UP (ref 135–146)
SP GR SPEC: 1.02 — SIGNIFICANT CHANGE UP (ref 1.01–1.03)
TROPONIN T SERPL-MCNC: <0.01 NG/ML — SIGNIFICANT CHANGE UP
UROBILINOGEN FLD QL: 1 MG/DL (ref 0.2–0.2)
WBC # BLD: 9.67 K/UL — SIGNIFICANT CHANGE UP (ref 4.8–10.8)
WBC # FLD AUTO: 9.67 K/UL — SIGNIFICANT CHANGE UP (ref 4.8–10.8)

## 2018-04-25 PROCEDURE — 93970 EXTREMITY STUDY: CPT | Mod: 26

## 2018-04-25 RX ORDER — METHADONE HYDROCHLORIDE 40 MG/1
165 TABLET ORAL DAILY
Qty: 0 | Refills: 0 | Status: DISCONTINUED | OUTPATIENT
Start: 2018-04-26 | End: 2018-04-27

## 2018-04-25 RX ORDER — SODIUM CHLORIDE 9 MG/ML
1000 INJECTION INTRAMUSCULAR; INTRAVENOUS; SUBCUTANEOUS
Qty: 0 | Refills: 0 | Status: DISCONTINUED | OUTPATIENT
Start: 2018-04-25 | End: 2018-04-27

## 2018-04-25 RX ORDER — ENOXAPARIN SODIUM 100 MG/ML
40 INJECTION SUBCUTANEOUS EVERY 24 HOURS
Qty: 0 | Refills: 0 | Status: DISCONTINUED | OUTPATIENT
Start: 2018-04-25 | End: 2018-04-28

## 2018-04-25 RX ORDER — SODIUM CHLORIDE 9 MG/ML
1000 INJECTION INTRAMUSCULAR; INTRAVENOUS; SUBCUTANEOUS ONCE
Qty: 0 | Refills: 0 | Status: COMPLETED | OUTPATIENT
Start: 2018-04-25 | End: 2018-04-25

## 2018-04-25 RX ORDER — GABAPENTIN 400 MG/1
800 CAPSULE ORAL THREE TIMES A DAY
Qty: 0 | Refills: 0 | Status: DISCONTINUED | OUTPATIENT
Start: 2018-04-25 | End: 2018-04-26

## 2018-04-25 RX ORDER — CEFAZOLIN SODIUM 1 G
2000 VIAL (EA) INJECTION ONCE
Qty: 0 | Refills: 0 | Status: COMPLETED | OUTPATIENT
Start: 2018-04-25 | End: 2018-04-25

## 2018-04-25 RX ORDER — DIVALPROEX SODIUM 500 MG/1
250 TABLET, DELAYED RELEASE ORAL DAILY
Qty: 0 | Refills: 0 | Status: DISCONTINUED | OUTPATIENT
Start: 2018-04-25 | End: 2018-04-26

## 2018-04-25 RX ORDER — NICOTINE POLACRILEX 2 MG
1 GUM BUCCAL DAILY
Qty: 0 | Refills: 0 | Status: DISCONTINUED | OUTPATIENT
Start: 2018-04-25 | End: 2018-04-28

## 2018-04-25 RX ORDER — ACETAMINOPHEN 500 MG
975 TABLET ORAL ONCE
Qty: 0 | Refills: 0 | Status: COMPLETED | OUTPATIENT
Start: 2018-04-25 | End: 2018-04-25

## 2018-04-25 RX ORDER — DEXAMETHASONE 0.5 MG/5ML
10 ELIXIR ORAL ONCE
Qty: 0 | Refills: 0 | Status: DISCONTINUED | OUTPATIENT
Start: 2018-04-25 | End: 2018-04-25

## 2018-04-25 RX ORDER — ACETAMINOPHEN 500 MG
650 TABLET ORAL ONCE
Qty: 0 | Refills: 0 | Status: COMPLETED | OUTPATIENT
Start: 2018-04-25 | End: 2018-04-25

## 2018-04-25 RX ORDER — DIVALPROEX SODIUM 500 MG/1
1000 TABLET, DELAYED RELEASE ORAL AT BEDTIME
Qty: 0 | Refills: 0 | Status: DISCONTINUED | OUTPATIENT
Start: 2018-04-25 | End: 2018-04-26

## 2018-04-25 RX ORDER — SODIUM CHLORIDE 9 MG/ML
3 INJECTION INTRAMUSCULAR; INTRAVENOUS; SUBCUTANEOUS EVERY 8 HOURS
Qty: 0 | Refills: 0 | Status: DISCONTINUED | OUTPATIENT
Start: 2018-04-25 | End: 2018-04-28

## 2018-04-25 RX ORDER — ACETAMINOPHEN 500 MG
325 TABLET ORAL ONCE
Qty: 0 | Refills: 0 | Status: COMPLETED | OUTPATIENT
Start: 2018-04-25 | End: 2018-04-25

## 2018-04-25 RX ORDER — IBUPROFEN 200 MG
400 TABLET ORAL ONCE
Qty: 0 | Refills: 0 | Status: COMPLETED | OUTPATIENT
Start: 2018-04-25 | End: 2018-04-25

## 2018-04-25 RX ORDER — MIRTAZAPINE 45 MG/1
15 TABLET, ORALLY DISINTEGRATING ORAL AT BEDTIME
Qty: 0 | Refills: 0 | Status: DISCONTINUED | OUTPATIENT
Start: 2018-04-25 | End: 2018-04-26

## 2018-04-25 RX ORDER — CLONAZEPAM 1 MG
1 TABLET ORAL
Qty: 0 | Refills: 0 | Status: DISCONTINUED | OUTPATIENT
Start: 2018-04-25 | End: 2018-04-26

## 2018-04-25 RX ORDER — QUETIAPINE FUMARATE 200 MG/1
100 TABLET, FILM COATED ORAL
Qty: 0 | Refills: 0 | Status: DISCONTINUED | OUTPATIENT
Start: 2018-04-25 | End: 2018-04-26

## 2018-04-25 RX ORDER — VANCOMYCIN HCL 1 G
1000 VIAL (EA) INTRAVENOUS EVERY 12 HOURS
Qty: 0 | Refills: 0 | Status: DISCONTINUED | OUTPATIENT
Start: 2018-04-25 | End: 2018-04-26

## 2018-04-25 RX ORDER — LACTULOSE 10 G/15ML
20 SOLUTION ORAL
Qty: 0 | Refills: 0 | Status: DISCONTINUED | OUTPATIENT
Start: 2018-04-25 | End: 2018-04-28

## 2018-04-25 RX ADMIN — Medication 400 MILLIGRAM(S): at 23:54

## 2018-04-25 RX ADMIN — SODIUM CHLORIDE 1000 MILLILITER(S): 9 INJECTION INTRAMUSCULAR; INTRAVENOUS; SUBCUTANEOUS at 15:12

## 2018-04-25 RX ADMIN — DIVALPROEX SODIUM 1000 MILLIGRAM(S): 500 TABLET, DELAYED RELEASE ORAL at 21:11

## 2018-04-25 RX ADMIN — Medication 100 MILLIGRAM(S): at 12:54

## 2018-04-25 RX ADMIN — SODIUM CHLORIDE 3 MILLILITER(S): 9 INJECTION INTRAMUSCULAR; INTRAVENOUS; SUBCUTANEOUS at 11:45

## 2018-04-25 RX ADMIN — Medication 1 MILLIGRAM(S): at 19:08

## 2018-04-25 RX ADMIN — SODIUM CHLORIDE 3 MILLILITER(S): 9 INJECTION INTRAMUSCULAR; INTRAVENOUS; SUBCUTANEOUS at 21:11

## 2018-04-25 RX ADMIN — MIRTAZAPINE 15 MILLIGRAM(S): 45 TABLET, ORALLY DISINTEGRATING ORAL at 21:11

## 2018-04-25 RX ADMIN — Medication 650 MILLIGRAM(S): at 22:00

## 2018-04-25 RX ADMIN — QUETIAPINE FUMARATE 100 MILLIGRAM(S): 200 TABLET, FILM COATED ORAL at 19:09

## 2018-04-25 RX ADMIN — Medication 400 MILLIGRAM(S): at 23:24

## 2018-04-25 RX ADMIN — ENOXAPARIN SODIUM 40 MILLIGRAM(S): 100 INJECTION SUBCUTANEOUS at 19:10

## 2018-04-25 RX ADMIN — Medication 975 MILLIGRAM(S): at 12:47

## 2018-04-25 RX ADMIN — Medication 250 MILLIGRAM(S): at 20:31

## 2018-04-25 RX ADMIN — GABAPENTIN 800 MILLIGRAM(S): 400 CAPSULE ORAL at 21:11

## 2018-04-25 SDOH — ECONOMIC STABILITY - HOUSING INSECURITY: HOMELESSNESS: Z59.0

## 2018-04-25 NOTE — H&P ADULT - ASSESSMENT
32 year old Male with h/o PTSD, undomiciled (currently staying in a shelter), single, unemployed (admits to stealing to maintain his existence) male with opioid use disorder, on MMTP ([facility] [phone] currently on 165mg of Methadone daily), sedative-hypnotic use disorder, Cannibus use disorder, ? Bipolar disorder, remote history of OCD and Tourette's as a child. He was sent in from Intake today due to fevers of 102 and lower extremity swelling and erythema concerning for cellulitis. Further questioning reveals a suicidal patient with suicidal ideations and attempts to cut his right wrist with a piece of glass and threatening to jump off the 7th floor of his mother apartment if discharged home. He is estranged from his family. Review of prior ED records shows a similar pattern of attempts at wrist slashing. He was evaluated by Dr. Patel on 4/6/18 and medications adjustments made.

## 2018-04-25 NOTE — H&P ADULT - NSHPSOCIALHISTORY_GEN_ALL_CORE
Unemployed, involved in stealing.  Estranged form his Family.  Has been smoking cigarette 1 ppd since age 8 years.  Uses Marijuana, previously used heroin now on methadone. On Klonopin prescribed as well as Xanax obtained form the streets.

## 2018-04-25 NOTE — H&P ADULT - PMH
Depression, unspecified depression type    Polysubstance abuse    PTSD (post-traumatic stress disorder)    Suicidal ideation

## 2018-04-25 NOTE — ED PROVIDER NOTE - PROGRESS NOTE DETAILS
venous duplex prelim negative. Case discussed Dr Stiles, accepts admission pt has been resting on stretcher in nad, abx added for possible cellulitis on abdomen and lower ext, pt aware and understands, on 1:1, fluids given, psych aware of, pt understands plan for admission, admitting physician made aware. pt asked to eat, toelrated po in ed, recived abx and fluids, psych evaluted pt, aware, following.

## 2018-04-25 NOTE — ED PROVIDER NOTE - CARE PLAN
Principal Discharge DX:	Cellulitis  Secondary Diagnosis:	Depression, unspecified depression type  Secondary Diagnosis:	Drug abuse

## 2018-04-25 NOTE — ED PROVIDER NOTE - OBJECTIVE STATEMENT
Sent from intake for fever and suicidal ideations, Patient cut right forearm with broke glass on way to ED. admits to benzo use , no recent IVDA, no C/P. No cough, no abdominal pain. Co/ bilateral feet swelling and pain

## 2018-04-25 NOTE — H&P ADULT - NSHPLABSRESULTS_GEN_ALL_CORE
10.6   9.67  )-----------( 227      ( 25 Apr 2018 11:20 )             31.6     04-25    142  |  102  |  12  ----------------------------<  81  4.2   |  30  |  0.9    Ca    8.6      25 Apr 2018 11:20  Mg     1.9     04-25    TPro  6.4  /  Alb  3.9  /  TBili  0.2  /  DBili  x   /  AST  20  /  ALT  19  /  AlkPhos  48  04-25      [facility] Duplex Lower Ext Vein Scan, Bilat (04.25.18 @ 12:06)     No evidence of deep venous thrombosis or superficial thrombophlebitis in   the bilateral lower extremities.      X-ray Chest 2 Views PA/Lat (04.25.18 @ 11:35)     No radiographic evidence of acute cardiopulmonary disease.    Urinalysis (04.25.18 @ 11:20)    Glucose Qualitative, Urine: Negative mg/dL    Blood, Urine: Negative    pH Urine: 6.5    Color: Yellow    Urine Appearance: Clear    Bilirubin: Small    Ketone - Urine: Trace    Specific Gravity: 1.025    Protein, Urine: 30 mg/dL    Urobilinogen: 1.0 mg/dL    Nitrite: Negative    Leukocyte Esterase Concentration: Negative    Bacteria: Few    Acetaminophen Level, Serum: <5.0 ug/mL (04.25.18 @ 11:20)  Salicylate Level, Serum: <0.3 mg/dL (04.25.18 @ 11:20)  Alcohol, Blood: <10 mg/dL (04.25.18 @ 11:20)

## 2018-04-25 NOTE — ED ADULT TRIAGE NOTE - CHIEF COMPLAINT QUOTE
Pt states "I was across the street.  They told me I couldn't go to Detox because I have a fever.  I got mad and cut my wrist.  I'm not taking my medication.  I spilled coffee on my stomach 3 days ago and it's red".

## 2018-04-25 NOTE — ED PROVIDER NOTE - MEDICAL DECISION MAKING DETAILS
I personally eval the patient. I rev’d the Resident’s or Physician Assistant’s note (as assigned above), and agree with the findings and plan except as documented in my note. 33yo M to ED for SI. Pt has been cutting his right arm and states that he wanted to cut his artery. Pt states that he was sent to the ED for a fever from intake. Pt states that he also spilled hot coffee on his abd about 4 days ago. Pt denies any AVH. Pt takes methadone, Neurontin and abuses benzo’s, wanted to go to Detox for this. Pt denies any ETOH abuse. Pt last detox was months ago. No f/c, n/v, cp, sob, pleuritic cp, palpitations, diaphoresis, cough, ha/lh/dizziness, numbness/tingling, neck pain/ stiffness, abd pain, diarrhea, constipation, melena/brbpr, urinary symptoms, trauma, weakness,  calf pain/swelling/erythema, sick contacts, recent travel or rash. VS: I have reviewed the initial VS. Constitutional: nad. Sitting on stretcher in nad.Integ: No rash. ENT: MMM NECK: Supple, non-tender, no menineal signs. Card: RRR, radial pulses 2/4 b/l. No JVD. Resp: BS present b/l, ctabl, no wheezing or crackles, good resp effort and excursion, good air exchange,  no accessory muscle use, no stridor. G: BS present throughout all 4 quadrants, soft, nd, nt, no rebound tenderness or guarding, no cvat. Musculoskeletal: FROM, no edema, no calf pain/swelling/erythema. Neurologic: AAOx3, motor 5/5 and sensation intact throughout upper and lowe ext, CN II-XII intact, No facial droop or slurring of speech. No focal deficits. No clonus or rigidity. No tremors. No tongue fasciculations, no nystagmus. Right forearm with abrasions and superficial lacerations. b/l ankles and dorsal feet with erythema, scaly, peeling skin, warm to palpation and pain to palp. No crepitus, no induration or fluctuance. DP and PT pulses present. Plan: Labs, IV fluids, CXR, urine, venous duplex. Pt UTD on tetanus, psych consult. Reassess. Right abdomen with erythema and warmth to palpation, no crepitus, no induration, no d/c I personally eval the patient. I rev’d the Resident’s or Physician Assistant’s note (as assigned above), and agree with the findings and plan except as documented in my note. 33yo M to ED for SI. Pt has been cutting his right arm and states that he wanted to cut his artery. Pt states that he was sent to the ED for a fever from intake. Pt states that he also spilled hot coffee on his abd about 4 days ago. Pt denies any AVH. Pt takes methadone, Neurontin and abuses benzo’s, wanted to go to Detox for this. Pt denies any ETOH abuse. Pt last detox was months ago. No f/c, n/v, cp, sob, pleuritic cp, palpitations, diaphoresis, cough, ha/lh/dizziness, numbness/tingling, neck pain/ stiffness, abd pain, diarrhea, constipation, melena/brbpr, urinary symptoms, trauma, weakness,  calf pain/swelling/erythema, sick contacts, recent travel or rash. VS: I have reviewed the initial VS. Constitutional: nad. .Integ: Right forearm with abrasions and superficial lacerations. b/l ankles and dorsal feet with erythema, scaly, peeling skin, warmth to palpation and pain to palp. No crepitus, no induration or fluctuance. DP and PT pulses present. Right abdomen with erythema and warmth to palpation, no crepitus, no induration, no d/c. ENT: MMM NECK: Supple, non-tender, no meningeal signs. Card: RRR, radial pulses 2/4 b/l. No JVD. Resp: BS present b/l, ctabl, no wheezing or crackles, good resp effort and excursion, good air exchange,  no accessory muscle use, no stridor. G: BS present throughout all 4 quadrants, soft, nd, nt, no rebound tenderness or guarding, no cvat. Musculoskeletal: FROM, no edema, no calf pain/swelling/erythema. Neurologic: AAOx3, motor 5/5 and sensation intact throughout upper and lowe ext, CN II-XII intact, No facial droop or slurring of speech. No focal deficits. No clonus or rigidity. No tremors. No tongue fasciculations, no nystagmus.  Plan: Labs, IV fluids, abx, CXR, urine, venous duplex. Pt UTD on tetanus, psych consult. Reassess.

## 2018-04-25 NOTE — H&P ADULT - NSHPPHYSICALEXAM_GEN_ALL_CORE
VITALS:  T(F): 97.7 (04-25-18 @ 16:11), Max: 102.3 (04-25-18 @ 11:06)  HR: 80 (04-25-18 @ 16:11) (76 - 95)  BP: 100/59 (04-25-18 @ 16:11) (100/59 - 137/84)  RR: 18 (04-25-18 @ 16:11) (18 - 18)  SpO2: 99% (04-25-18 @ 14:46) (98% - 99%)    PHYSICAL EXAM:  GENERAL: NAD, well-developed  HEAD:  Atraumatic, Normocephalic  EYES: EOMI, PERRLA, conjunctiva and sclera clear  ENMT: Moist mucous membranes  NECK: Supple, Normal thyroid  NERVOUS SYSTEM:  Alert & Oriented X3, Good concentration; Motor Strength 5/5 B/L upper and lower extremities  CHEST/LUNG: Clear to auscultation bilaterally; No rales, rhonchi, wheezing, or rubs  HEART: Regular rate and rhythm; No murmurs, rubs, or gallops  ABDOMEN: Soft, Nontender, Nondistended; Bowel sounds present  EXTREMITIES:  2+ Peripheral Pulses, No clubbing, cyanosis, Bipedal edema +  LYMPH: No lymphadenopathy noted  SKIN: Erythema of the feet with superficial ulcerations and pustular lesions. Several superficial cuts on the right wrist.

## 2018-04-25 NOTE — H&P ADULT - HISTORY OF PRESENT ILLNESS
32 year old Male with h/o PTSD, undomiciled (currently staying in a shelter), single, unemployed (admits to stealing to maintain his existence) male with opioid use disorder, on MMTP ([facility] [phone] currently on 165mg of Methadone daily), sedative-hypnotic use disorder, Cannibus use disorder, ? Bipolar disorder, remote history of OCD and Tourette's as a child. He was sent in from Intake today due to fevers of 102 and lower extremity swelling and erythema concerning for cellulitis. Further questioning reveals a suicidal patient with suicidal ideations and attempts to cut his right wrist with a piece of glass and threatening to jump off the 7th floor of his mother apartment if discharged home. He is estranged from his family. Review of prior ED records shows a similar pattern of attempts at wrist slashing. He was evaluated by Dr. Patel on 4/6/18 and medications adjustments made.

## 2018-04-25 NOTE — H&P ADULT - NSHPREVIEWOFSYSTEMS_GEN_ALL_CORE
CONSTITUTIONAL: + fever, weight loss, or fatigue  EYES: No eye pain, visual disturbances, or discharge  ENMT:  No difficulty hearing, tinnitus, vertigo; No sinus or throat pain  NECK: No pain or stiffness  BREASTS: No pain, masses, or nipple discharge  RESPIRATORY: No cough, wheezing, chills or hemoptysis; No shortness of breath  CARDIOVASCULAR: No chest pain, palpitations, dizziness, or leg swelling  GASTROINTESTINAL: No abdominal or epigastric pain. No nausea, vomiting, or hematemesis; No diarrhea or constipation. No melena or hematochezia.  GENITOURINARY: No dysuria, frequency, hematuria, or incontinence  NEUROLOGICAL: No headaches, memory loss, loss of strength, numbness, or tremors  SKIN: ulcerations on the feet.   LYMPH NODES: No enlarged glands  ENDOCRINE: No heat or cold intolerance; No hair loss  MUSCULOSKELETAL: No joint pain, Bilateral Lower extremity swelling; No muscle, back, or extremity pain  PSYCHIATRIC: + depression with suicidal ideations, + anxiety, + mood swings, + difficulty sleeping  HEME/LYMPH: No easy bruising, or bleeding gums  ALLERGY AND IMMUNOLOGIC: No hives or eczema

## 2018-04-25 NOTE — ED ADULT NURSE NOTE - PMH
Depression, unspecified depression type    PTSD (post-traumatic stress disorder)    Suicidal ideation

## 2018-04-26 DIAGNOSIS — B36.9 SUPERFICIAL MYCOSIS, UNSPECIFIED: ICD-10-CM

## 2018-04-26 LAB
ALBUMIN SERPL ELPH-MCNC: 3.3 G/DL — LOW (ref 3.5–5.2)
ALP SERPL-CCNC: 49 U/L — SIGNIFICANT CHANGE UP (ref 30–115)
ALT FLD-CCNC: 13 U/L — SIGNIFICANT CHANGE UP (ref 0–41)
ANION GAP SERPL CALC-SCNC: 10 MMOL/L — SIGNIFICANT CHANGE UP (ref 7–14)
AST SERPL-CCNC: 14 U/L — SIGNIFICANT CHANGE UP (ref 0–41)
BASOPHILS # BLD AUTO: 0.04 K/UL — SIGNIFICANT CHANGE UP (ref 0–0.2)
BASOPHILS NFR BLD AUTO: 0.5 % — SIGNIFICANT CHANGE UP (ref 0–1)
BILIRUB SERPL-MCNC: <0.2 MG/DL — SIGNIFICANT CHANGE UP (ref 0.2–1.2)
BUN SERPL-MCNC: 15 MG/DL — SIGNIFICANT CHANGE UP (ref 10–20)
CALCIUM SERPL-MCNC: 8.1 MG/DL — LOW (ref 8.5–10.1)
CHLORIDE SERPL-SCNC: 102 MMOL/L — SIGNIFICANT CHANGE UP (ref 98–110)
CO2 SERPL-SCNC: 28 MMOL/L — SIGNIFICANT CHANGE UP (ref 17–32)
CREAT SERPL-MCNC: 0.9 MG/DL — SIGNIFICANT CHANGE UP (ref 0.7–1.5)
EOSINOPHIL # BLD AUTO: 0.15 K/UL — SIGNIFICANT CHANGE UP (ref 0–0.7)
EOSINOPHIL NFR BLD AUTO: 1.8 % — SIGNIFICANT CHANGE UP (ref 0–8)
GLUCOSE SERPL-MCNC: 77 MG/DL — SIGNIFICANT CHANGE UP (ref 70–99)
HCT VFR BLD CALC: 32.1 % — LOW (ref 42–52)
HCV AB S/CO SERPL IA: 0.15 S/CO — SIGNIFICANT CHANGE UP
HCV AB SERPL-IMP: SIGNIFICANT CHANGE UP
HGB BLD-MCNC: 10.7 G/DL — LOW (ref 14–18)
IMM GRANULOCYTES NFR BLD AUTO: 0.8 % — HIGH (ref 0.1–0.3)
LYMPHOCYTES # BLD AUTO: 1 K/UL — LOW (ref 1.2–3.4)
LYMPHOCYTES # BLD AUTO: 12.1 % — LOW (ref 20.5–51.1)
MAGNESIUM SERPL-MCNC: 2.1 MG/DL — SIGNIFICANT CHANGE UP (ref 1.8–2.4)
MCHC RBC-ENTMCNC: 30 PG — SIGNIFICANT CHANGE UP (ref 27–31)
MCHC RBC-ENTMCNC: 33.3 G/DL — SIGNIFICANT CHANGE UP (ref 32–37)
MCV RBC AUTO: 89.9 FL — SIGNIFICANT CHANGE UP (ref 80–94)
MONOCYTES # BLD AUTO: 0.73 K/UL — HIGH (ref 0.1–0.6)
MONOCYTES NFR BLD AUTO: 8.8 % — SIGNIFICANT CHANGE UP (ref 1.7–9.3)
NEUTROPHILS # BLD AUTO: 6.29 K/UL — SIGNIFICANT CHANGE UP (ref 1.4–6.5)
NEUTROPHILS NFR BLD AUTO: 76 % — HIGH (ref 42.2–75.2)
NRBC # BLD: 0 /100 WBCS — SIGNIFICANT CHANGE UP (ref 0–0)
PHOSPHATE SERPL-MCNC: 4.7 MG/DL — SIGNIFICANT CHANGE UP (ref 2.1–4.9)
PLATELET # BLD AUTO: 180 K/UL — SIGNIFICANT CHANGE UP (ref 130–400)
POTASSIUM SERPL-MCNC: 4.4 MMOL/L — SIGNIFICANT CHANGE UP (ref 3.5–5)
POTASSIUM SERPL-SCNC: 4.4 MMOL/L — SIGNIFICANT CHANGE UP (ref 3.5–5)
PROT SERPL-MCNC: 5.6 G/DL — LOW (ref 6–8)
RBC # BLD: 3.57 M/UL — LOW (ref 4.7–6.1)
RBC # FLD: 12.9 % — SIGNIFICANT CHANGE UP (ref 11.5–14.5)
SODIUM SERPL-SCNC: 140 MMOL/L — SIGNIFICANT CHANGE UP (ref 135–146)
WBC # BLD: 8.28 K/UL — SIGNIFICANT CHANGE UP (ref 4.8–10.8)
WBC # FLD AUTO: 8.28 K/UL — SIGNIFICANT CHANGE UP (ref 4.8–10.8)

## 2018-04-26 RX ORDER — PHENOBARBITAL 60 MG
TABLET ORAL
Qty: 0 | Refills: 0 | Status: DISCONTINUED | OUTPATIENT
Start: 2018-04-26 | End: 2018-04-28

## 2018-04-26 RX ORDER — DIVALPROEX SODIUM 500 MG/1
500 TABLET, DELAYED RELEASE ORAL EVERY 12 HOURS
Qty: 0 | Refills: 0 | Status: DISCONTINUED | OUTPATIENT
Start: 2018-04-26 | End: 2018-04-28

## 2018-04-26 RX ORDER — GABAPENTIN 400 MG/1
300 CAPSULE ORAL EVERY 8 HOURS
Qty: 0 | Refills: 0 | Status: DISCONTINUED | OUTPATIENT
Start: 2018-04-26 | End: 2018-04-28

## 2018-04-26 RX ORDER — CEPHALEXIN 500 MG
750 CAPSULE ORAL THREE TIMES A DAY
Qty: 0 | Refills: 0 | Status: DISCONTINUED | OUTPATIENT
Start: 2018-04-26 | End: 2018-04-28

## 2018-04-26 RX ORDER — PANTOPRAZOLE SODIUM 20 MG/1
40 TABLET, DELAYED RELEASE ORAL
Qty: 0 | Refills: 0 | Status: DISCONTINUED | OUTPATIENT
Start: 2018-04-26 | End: 2018-04-28

## 2018-04-26 RX ORDER — PHENOBARBITAL 60 MG
32.4 TABLET ORAL EVERY 6 HOURS
Qty: 0 | Refills: 0 | Status: DISCONTINUED | OUTPATIENT
Start: 2018-04-26 | End: 2018-04-27

## 2018-04-26 RX ORDER — ACETAMINOPHEN 500 MG
650 TABLET ORAL EVERY 6 HOURS
Qty: 0 | Refills: 0 | Status: DISCONTINUED | OUTPATIENT
Start: 2018-04-26 | End: 2018-04-28

## 2018-04-26 RX ORDER — IBUPROFEN 200 MG
600 TABLET ORAL EVERY 6 HOURS
Qty: 0 | Refills: 0 | Status: DISCONTINUED | OUTPATIENT
Start: 2018-04-26 | End: 2018-04-28

## 2018-04-26 RX ORDER — PHENOBARBITAL 60 MG
32.4 TABLET ORAL EVERY 6 HOURS
Qty: 0 | Refills: 0 | Status: COMPLETED | OUTPATIENT
Start: 2018-04-28 | End: 2018-04-28

## 2018-04-26 RX ORDER — PHENOBARBITAL 60 MG
32.4 TABLET ORAL ONCE
Qty: 0 | Refills: 0 | Status: DISCONTINUED | OUTPATIENT
Start: 2018-04-26 | End: 2018-04-26

## 2018-04-26 RX ORDER — PHENOBARBITAL 60 MG
32.4 TABLET ORAL EVERY 12 HOURS
Qty: 0 | Refills: 0 | Status: CANCELLED | OUTPATIENT
Start: 2018-04-29 | End: 2018-04-28

## 2018-04-26 RX ADMIN — SODIUM CHLORIDE 3 MILLILITER(S): 9 INJECTION INTRAMUSCULAR; INTRAVENOUS; SUBCUTANEOUS at 13:14

## 2018-04-26 RX ADMIN — METHADONE HYDROCHLORIDE 165 MILLIGRAM(S): 40 TABLET ORAL at 09:26

## 2018-04-26 RX ADMIN — Medication 600 MILLIGRAM(S): at 14:24

## 2018-04-26 RX ADMIN — Medication 750 MILLIGRAM(S): at 21:10

## 2018-04-26 RX ADMIN — GABAPENTIN 800 MILLIGRAM(S): 400 CAPSULE ORAL at 05:36

## 2018-04-26 RX ADMIN — Medication 1 APPLICATION(S): at 13:25

## 2018-04-26 RX ADMIN — Medication 250 MILLIGRAM(S): at 05:35

## 2018-04-26 RX ADMIN — SODIUM CHLORIDE 3 MILLILITER(S): 9 INJECTION INTRAMUSCULAR; INTRAVENOUS; SUBCUTANEOUS at 05:38

## 2018-04-26 RX ADMIN — GABAPENTIN 300 MILLIGRAM(S): 400 CAPSULE ORAL at 13:25

## 2018-04-26 RX ADMIN — Medication 1 PATCH: at 11:21

## 2018-04-26 RX ADMIN — Medication 750 MILLIGRAM(S): at 14:22

## 2018-04-26 RX ADMIN — SODIUM CHLORIDE 3 MILLILITER(S): 9 INJECTION INTRAMUSCULAR; INTRAVENOUS; SUBCUTANEOUS at 21:05

## 2018-04-26 RX ADMIN — Medication 650 MILLIGRAM(S): at 11:55

## 2018-04-26 RX ADMIN — Medication 1 MILLIGRAM(S): at 05:35

## 2018-04-26 RX ADMIN — DIVALPROEX SODIUM 500 MILLIGRAM(S): 500 TABLET, DELAYED RELEASE ORAL at 18:15

## 2018-04-26 RX ADMIN — DIVALPROEX SODIUM 250 MILLIGRAM(S): 500 TABLET, DELAYED RELEASE ORAL at 11:21

## 2018-04-26 RX ADMIN — Medication 1 APPLICATION(S): at 18:14

## 2018-04-26 RX ADMIN — Medication 32.4 MILLIGRAM(S): at 18:18

## 2018-04-26 RX ADMIN — Medication 32.4 MILLIGRAM(S): at 11:23

## 2018-04-26 RX ADMIN — ENOXAPARIN SODIUM 40 MILLIGRAM(S): 100 INJECTION SUBCUTANEOUS at 18:15

## 2018-04-26 RX ADMIN — LACTULOSE 20 GRAM(S): 10 SOLUTION ORAL at 05:36

## 2018-04-26 RX ADMIN — GABAPENTIN 300 MILLIGRAM(S): 400 CAPSULE ORAL at 21:11

## 2018-04-26 RX ADMIN — LACTULOSE 20 GRAM(S): 10 SOLUTION ORAL at 18:18

## 2018-04-26 NOTE — PROGRESS NOTE ADULT - PROBLEM SELECTOR PLAN 4
Continue current medications.  1:1 observation. Continue current medications.  Discontinue 1:1 observation.

## 2018-04-26 NOTE — PROGRESS NOTE ADULT - PROBLEM SELECTOR PLAN 1
Continue IV Vancomycin. Blood cultures.  ID consulted.  Wound care consult. ID consulted: recommends Topical antifungal cream for the feet and cephalexin 750 mg q8hr for 10 days.  Tylenol/Motrin prn for fevers.

## 2018-04-26 NOTE — PROGRESS NOTE ADULT - SUBJECTIVE AND OBJECTIVE BOX
JACK FITZGERALD  32y  Male    Patient is a 32y old  Male who presents with a chief complaint of Fever (25 Apr 2018 16:40)      INTERVAL HPI/OVERNIGHT EVENTS:      REVIEW OF SYSTEMS:  CONSTITUTIONAL: No fever, weight loss, or fatigue  EYES: No eye pain, visual disturbances, or discharge  ENMT:  No difficulty hearing, tinnitus, vertigo; No sinus or throat pain  NECK: No pain or stiffness  BREASTS: No pain, masses, or nipple discharge  RESPIRATORY: No cough, wheezing, chills or hemoptysis; No shortness of breath  CARDIOVASCULAR: No chest pain, palpitations, dizziness, or leg swelling  GASTROINTESTINAL: No abdominal or epigastric pain. No nausea, vomiting, or hematemesis; No diarrhea or constipation. No melena or hematochezia.  GENITOURINARY: No dysuria, frequency, hematuria, or incontinence  NEUROLOGICAL: No headaches, memory loss, loss of strength, numbness, or tremors  SKIN: No itching, burning, rashes, or lesions   LYMPH NODES: No enlarged glands  ENDOCRINE: No heat or cold intolerance; No hair loss  MUSCULOSKELETAL: No joint pain or swelling; No muscle, back, or extremity pain  PSYCHIATRIC: No depression, anxiety, mood swings, or difficulty sleeping  HEME/LYMPH: No easy bruising, or bleeding gums  ALLERY AND IMMUNOLOGIC: No hives or eczema    VITALS:  T(F): 97.4 (04-26-18 @ 06:06), Max: 104.1 (04-25-18 @ 22:50)  HR: 73 (04-26-18 @ 06:06) (73 - 95)  BP: 104/59 (04-26-18 @ 06:06) (100/59 - 137/84)  RR: 18 (04-26-18 @ 06:06) (18 - 18)  SpO2: 99% (04-25-18 @ 14:46) (98% - 99%)    PHYSICAL EXAM:  GENERAL: NAD, well-groomed, well-developed  HEAD:  Atraumatic, Normocephalic  EYES: EOMI, PERRLA, conjunctiva and sclera clear  ENMT: No tonsillar erythema, exudates, or enlargement; Moist mucous membranes, Good dentition, No lesions  NECK: Supple, No JVD, Normal thyroid  NERVOUS SYSTEM:  Alert & Oriented X3, Good concentration; Motor Strength 5/5 B/L upper and lower extremities; DTRs 2+ intact and symmetric  CHEST/LUNG: Clear to percussion bilaterally; No rales, rhonchi, wheezing, or rubs  HEART: Regular rate and rhythm; No murmurs, rubs, or gallops  ABDOMEN: Soft, Nontender, Nondistended; Bowel sounds present  EXTREMITIES:  2+ Peripheral Pulses, No clubbing, cyanosis, or edema  LYMPH: No lymphadenopathy noted  SKIN: No rashes or lesions    Consultant(s) Notes Reviewed:  [x ] YES  [ ] NO  Care Discussed with Consultants/Other Providers [ x] YES  [ ] NO    LABS:  MICROBIOLOGY:      RADIOLOGY & ADDITIONAL TESTS:    Imaging Personally Reviewed:  [ ] YES  [ ] NO  MEDICATION:  clonazePAM Tablet 1 milliGRAM(s) Oral two times a day  clotrimazole 1% Cream 1 Application(s) Topical two times a day  diVALproex  milliGRAM(s) Oral daily  diVALproex DR 1000 milliGRAM(s) Oral at bedtime  enoxaparin Injectable 40 milliGRAM(s) SubCutaneous every 24 hours  gabapentin 800 milliGRAM(s) Oral three times a day  lactulose Syrup 20 Gram(s) Oral two times a day  methadone    Tablet 165 milliGRAM(s) Oral daily  mirtazapine 15 milliGRAM(s) Oral at bedtime  nicotine - 21 mG/24Hr(s) Patch 1 patch Transdermal daily  QUEtiapine 100 milliGRAM(s) Oral two times a day  sodium chloride 0.9% lock flush 3 milliLiter(s) IV Push every 8 hours  sodium chloride 0.9%. 1000 milliLiter(s) IV Continuous <Continuous>      HEALTH ISSUES:  HEALTH ISSUES - PROBLEM Dx:  Fungal dermatitis: Fungal dermatitis  Suicidal ideation: Suicidal ideation  Polysubstance abuse: Polysubstance abuse  Depression, unspecified depression type: Depression, unspecified depression type  Cellulitis: Cellulitis JACK FITZGERALD  32y  Male    Patient is a 32y old  Male who presents with a chief complaint of Fever (25 Apr 2018 16:40)      INTERVAL HPI/OVERNIGHT EVENTS:  No new complaints. Pain the the feet feel better. Tmax 104.1.    REVIEW OF SYSTEMS:  CONSTITUTIONAL: No fever, weight loss, or fatigue  EYES: No eye pain, visual disturbances, or discharge  ENMT:  No difficulty hearing, tinnitus, vertigo; No sinus or throat pain  NECK: No pain or stiffness  RESPIRATORY: No cough, wheezing, chills or hemoptysis; No shortness of breath  CARDIOVASCULAR: No chest pain, palpitations, dizziness, or leg swelling  GASTROINTESTINAL: No abdominal or epigastric pain. No nausea, vomiting, or hematemesis; No diarrhea or constipation. No melena or hematochezia.  GENITOURINARY: No dysuria, frequency, hematuria, or incontinence  NEUROLOGICAL: No headaches, memory loss, loss of strength, numbness, or tremors  SKIN: Bilateral feet ulceration and erythema on the right lower anterior abdominal wall.   LYMPH NODES: No enlarged glands  ENDOCRINE: No heat or cold intolerance; No hair loss  MUSCULOSKELETAL: Bilateral leg pain and swelling with erythema.  PSYCHIATRIC: No depression, anxiety, mood swings, or difficulty sleeping  HEME/LYMPH: No easy bruising, or bleeding gums      VITALS:  T(F): 97.4 (04-26-18 @ 06:06), Max: 104.1 (04-25-18 @ 22:50)  HR: 73 (04-26-18 @ 06:06) (73 - 95)  BP: 104/59 (04-26-18 @ 06:06) (100/59 - 137/84)  RR: 18 (04-26-18 @ 06:06) (18 - 18)  SpO2: 99% (04-25-18 @ 14:46) (98% - 99%)    PHYSICAL EXAM:  GENERAL: NAD, well-developed  HEAD:  Atraumatic, Normocephalic  EYES: EOMI, PERRLA, conjunctiva and sclera clear  ENMT: Moist mucous membranes  NECK: Supple, Normal thyroid  NERVOUS SYSTEM:  Alert & Oriented X3, Good concentration; Motor Strength 5/5 B/L upper and lower extremities  CHEST/LUNG: Clear to auscultation bilaterally; No rales, rhonchi, wheezing, or rubs  HEART: Regular rate and rhythm; No murmurs, rubs, or gallops  ABDOMEN: Soft, Nontender, Nondistended; Bowel sounds present  EXTREMITIES:  2+ Peripheral Pulses, No clubbing, cyanosis, or edema  LYMPH: No cervical lymphadenopathy noted  SKIN: No rashes or lesions    Consultant(s) Notes Reviewed:  [x ] YES  [ ] NO  Care Discussed with Consultants/Other Providers [ x] YES  [ ] NO    LABS:                        10.7   8.28  )-----------( 180      ( 26 Apr 2018 06:00 )             32.1     04-26    140  |  102  |  15  ----------------------------<  77  4.4   |  28  |  0.9    Ca    8.1<L>      26 Apr 2018 06:00  Phos  4.7     04-26  Mg     2.1     04-26    TPro  5.6<L>  /  Alb  3.3<L>  /  TBili  <0.2  /  DBili  x   /  AST  14  /  ALT  13  /  AlkPhos  49  04-26    Urinalysis (04.25.18 @ 11:20)    Glucose Qualitative, Urine: Negative mg/dL    Blood, Urine: Negative    pH Urine: 6.5    Color: Yellow    Urine Appearance: Clear    Bilirubin: Small    Ketone - Urine: Trace    Specific Gravity: 1.025    Protein, Urine: 30 mg/dL    Urobilinogen: 1.0 mg/dL    Nitrite: Negative    Leukocyte Esterase Concentration: Negative      MICROBIOLOGY: pending.    RADIOLOGY & ADDITIONAL TESTS:  X-ray Chest 2 Views PA/Lat (04.25.18 @ 11:35)     No radiographic evidence of acute cardiopulmonary disease.      VA Duplex Lower Ext Vein Scan, Bilat (04.25.18 @ 12:06)   No evidence of deep venous thrombosis or superficial thrombophlebitis in   the bilateral lower extremities.    Imaging Personally Reviewed:  [x] YES  [ ] NO    MEDICATIONS  (STANDING):  cephalexin 750 milliGRAM(s) Oral three times a day  clonazePAM Tablet 1 milliGRAM(s) Oral two times a day  clotrimazole 1% Cream 1 Application(s) Topical two times a day  diVALproex  milliGRAM(s) Oral daily  diVALproex DR 1000 milliGRAM(s) Oral at bedtime  enoxaparin Injectable 40 milliGRAM(s) SubCutaneous every 24 hours  gabapentin 800 milliGRAM(s) Oral three times a day  lactulose Syrup 20 Gram(s) Oral two times a day  methadone    Tablet 165 milliGRAM(s) Oral daily  mirtazapine 15 milliGRAM(s) Oral at bedtime  nicotine - 21 mG/24Hr(s) Patch 1 patch Transdermal daily  QUEtiapine 100 milliGRAM(s) Oral two times a day  silver sulfADIAZINE 1% Cream 1 Application(s) Topical once  sodium chloride 0.9% lock flush 3 milliLiter(s) IV Push every 8 hours  sodium chloride 0.9%. 1000 milliLiter(s) (75 mL/Hr) IV Continuous <Continuous>    MEDICATIONS  (PRN): None      HEALTH ISSUES - PROBLEM Dx:  Fungal dermatitis  Suicidal ideation  Polysubstance abuse  Depression, unspecified depression type  Cellulitis

## 2018-04-26 NOTE — CONSULT NOTE ADULT - ASSESSMENT
Benzo dependency  MMTP  Celullitis/dermatitis LE  MDD      counselling done  Interested in detox off benzos and eventually rehab/long term   Place on phenobarb protocol for benzo detox  D/C seroquel francesco since he is on high dose MMTP dose  Psych eval to adjust meds  Once medically and psychiatry cleared may transfer to detox if beds available.

## 2018-04-26 NOTE — CONSULT NOTE ADULT - ASSESSMENT
bipolar disorder mixed type  opioid dependence alcohol benzo abuse.    no need for ipp or 1:1 observation for psych reason.  gabapenin 300 mg po tid  depakote 500 mg po bid  seroquel 100 mg po q am and hs  detox and rehab consult   cleared for inpatient addiction program.

## 2018-04-26 NOTE — PROGRESS NOTE ADULT - ASSESSMENT
32 year old Male with h/o PTSD, undomiciled (currently staying in a shelter), single, unemployed (admits to stealing to maintain his existence) male with opioid use disorder, on MMTP ([facility] [phone] currently on 165mg of Methadone daily), sedative-hypnotic use disorder, Cannibus use disorder, ? Bipolar disorder, remote history of OCD and Tourette's as a child. He was sent in from Intake today due to fevers of 102 and lower extremity swelling and erythema concerning for cellulitis. Further questioning reveals a suicidal patient with suicidal ideations and attempts to cut his right wrist with a piece of glass and threatening to jump off the 7th floor of his mother apartment if discharged home. He is estranged from his family. Review of prior ED records shows a similar pattern of attempts at wrist slashing. He was evaluated by Dr. Patel on 4/6/18 and medications adjustments made.

## 2018-04-26 NOTE — CONSULT NOTE ADULT - SUBJECTIVE AND OBJECTIVE BOX
JACK FITZGERALD 32yMalePatient is a 32y old  Male who presents with a chief complaint of Fever (2018 16:40)      Patient has history of:  Haldol (Dystonic RXN)  Talwin (Unknown)    Hx of substance abuse    FSH - not relevant   ROS - not contributory     PHYSICAL EXAM  T(F): 97.4 (18 @ 06:06), Max: 104.1 (18 @ 22:50)  HR: 73 (18 @ 06:06) (73 - 95)  BP: 104/59 (18 @ 06:06) (100/59 - 137/84)  RR: 18 (18 @ 06:06) (18 - 18)  SpO2: 99% (18 @ 14:46) (98% - 99%)  Daily Height in cm: 187.96 (2018 16:11)    Daily   HEENT: normal, no nuchal rigidity  Cor: RSR Nl S1 S2  Lungs: clear  Decreased breath sounds at bases  Abdomen: Nontender, Nl BS,   Ext: Fungal rash - feet / lower legs     LAB & RADIOLOGIC RESULTS:                        10.6   9.67  )-----------( 227      ( 2018 11:20 )             31.6             142  |  102  |  12  ----------------------------<  81  4.2   |  30  |  0.9    Ca    8.6      2018 11:20  Mg     1.9         TPro  6.4  /  Alb  3.9  /  TBili  0.2  /  DBili  x   /  AST  20  /  ALT  19  /  AlkPhos  48      Blood Gas Venous - Sodium: 140 mmoL/L (18 @ 11:58)  Sodium, Serum: 142 mmol/L (18 @ 11:20)    Blood Gas Venous - Lactate: 0.9 mmoL/L (18 @ 11:58)    Lactic Acidosis     pH, Venous: 7.37 (18 @ 11:58)  HCO3, Venous: 30 mmoL/L (18 @ 11:58)     Creatinine, Serum: 0.9 mg/dL (18 @ 11:20)  eGFR if Non African American: 111 mL/min/1.73M2 (18 @ 11:20)  eGFR if : 129 mL/min/1.73M2 (18 @ 11:20)    LIVER FUNCTIONS - ( 2018 11:20 )  Alb: 3.9 g/dL / Pro: 6.4 g/dL / ALK PHOS: 48 U/L / ALT: 19 U/L / AST: 20 U/L / GGT: x           PT/INR - ( 2018 11:20 )   PT: 11.90 sec;   INR: 1.10 ratio         PTT - ( 2018 11:20 )  PTT:30.2 sec  Progress Note Adult-Hospitalist Attending [GALEN Garcia] (18 @ 22:55)  H&P Adult [TAYLOR Benton] (18 @ 16:40)  Patient Profile Adult [TAYLOR Carter] (18 @ 16:12)  ED Provider Note [GRIFFIN Ram] (18 @ 12:06)  ED ADULT Nurse Note [F. Amico] (18 @ 11:08)  ED ADULT Triage Note [F. Amico] (18 @ 11:06)  Progress Note Adult-Internal Medicine Attending [CARO Weber] (18 @ 08:25)  Progress Note Behavioral Health [GALEN Ardon] (18 @ 11:06)  Discharge Note Behavioral Health [ERIKA Lewis, GALEN Ardon] (18 @ 15:52)  Progress Note Behavioral Health [GALEN Ardon] (18 @ 13:32)  Progress Note Adult-Internal Medicine Attending [CARO Weber] (18 @ 08:36)  Progress Note Behavioral Health [GALEN Ardon] (18 @ 15:21)  Chart Note-Event Note SW [MILAGRO Herman] (18 @ 15:15)  Progress Note Behavioral Health [ELIZA Carter] (04-15-18 @ 08:59)  Progress Note Behavioral Health [N. Emma] (18 @ 17:54)  Chart Note-Event Note RT [HAYLIE Middleton] (18 @ 18:52)  Chart Note-Event Note SW [MILAGRO Herman] (18 @ 16:13)  Progress Note Behavioral Health [GALEN Ardon] (18 @ 14:32)  Consult Note Adult-Podiatry Resident/Attending [ABI Campbell] (18 @ 09:55)  Progress Note Behavioral Health [GALEN Ardon] (18 @ 13:59)  Chart Note-Event Note SW [MILAGRO Herman] (18 @ 16:03)  Progress Note Behavioral Health [GALEN Ardon] (18 @ 13:59)  Progress Note Adult-Internal Medicine Attending [CARO Weber] (18 @ 08:45)  Progress Note Behavioral Health [GALEN Ardon] (04-10-18 @ 14:11)  Progress Note Behavioral Health [GALEN Ardon] (18 @ 16:22)  Chart Note-Event Note SW [MILAGRO Herman] (18 @ 15:35)  Progress Note Behavioral Health [KRYSTAL Alexis] (18 @ 12:34)  Consult Note Adult-Internal Medicine Attending [CARO Weber] (18 @ 10:43)  Chart Note-Event Note PA [AME. Cuate] (18 @ 17:31)  H&P Adult [P. Emil] (18 @ 06:17)  Patient Profile Behavioral Health [O. Odeka] (18 @ 05:24)  ED ADULT Nurse Reassessment Note [RODRICK Sidhu] (18 @ 20:50)  ED Behavioral Health Assessment Note [ELIZA Nicholas] (18 @ 19:31)  ED Provider Note [GRIFFIN Tellez] (18 @ 17:07)  ED ADULT Nurse Reassessment Note [RODRICK Sidhu] (18 @ 16:15)  ED ADULT Nurse Note [SREE Mcgee] (18 @ 16:14)  ED ADULT Triage Note [E. Aparo] (04-06-18 @ 16:12)    Urinalysis Basic - ( 2018 11:20 )    Color: Yellow / Appearance: Clear / S.025 / pH: x  Gluc: x / Ketone: Trace  / Bili: Small / Urobili: 1.0 mg/dL   Blood: x / Protein: 30 mg/dL / Nitrite: Negative   Leuk Esterase: Negative / RBC: x / WBC x   Sq Epi: x / Non Sq Epi: x / Bacteria: Few
DETOX CONSULT    Pt has hx of benzo / xanax abuse  takes _4-5___ sticks/ mg / day  Hx of seizures __x__ yes  ____NO______Unckear.  Last one _2 yrs____  Enrolled in MMTP in [facility]  in [facility] in 2017  In [facility] few wks ago  H/O MDD  on psych meds  abusing xanax/klnopins  On medicine floor for celullitis LE and burn celullitis abd wall,  seen by ID  on Po Abx as of today    SOCIAL HISTORY:MMTP/[employer]    REVIEW OF SYSTEMS:    Constitutional: No fever, weight loss or fatigue  ENT:  No difficulty hearing, tinnitus, vertigo; No sinus or throat pain  Neck: No pain or stiffness  Respiratory: No cough, wheezing, chills or hemoptysis  Cardiovascular: No chest pain, palpitations, shortness of breath, dizziness or leg swelling  Gastrointestinal: No abdominal or epigastric pain. No nausea, vomiting or hematemesis; No diarrhea or constipation. No melena or hematochezia.  Neurological: No headaches, memory loss, loss of strength, numbness or tremors  Musculoskeletal: No joint pain or swelling; No muscle, back or extremity pain  Psychiatric: No depression, anxiety, mood swings or difficulty sleeping    MEDICATIONS  (STANDING):  cephalexin 750 milliGRAM(s) Oral three times a day  clonazePAM Tablet 1 milliGRAM(s) Oral two times a day  clotrimazole 1% Cream 1 Application(s) Topical two times a day  diVALproex  milliGRAM(s) Oral daily  diVALproex DR 1000 milliGRAM(s) Oral at bedtime  enoxaparin Injectable 40 milliGRAM(s) SubCutaneous every 24 hours  gabapentin 800 milliGRAM(s) Oral three times a day  lactulose Syrup 20 Gram(s) Oral two times a day  methadone    Tablet 165 milliGRAM(s) Oral daily  mirtazapine 15 milliGRAM(s) Oral at bedtime  nicotine - 21 mG/24Hr(s) Patch 1 patch Transdermal daily  QUEtiapine 100 milliGRAM(s) Oral two times a day  silver sulfADIAZINE 1% Cream 1 Application(s) Topical once  sodium chloride 0.9% lock flush 3 milliLiter(s) IV Push every 8 hours  sodium chloride 0.9%. 1000 milliLiter(s) (75 mL/Hr) IV Continuous <Continuous>    MEDICATIONS  (PRN):  acetaminophen   Tablet 650 milliGRAM(s) Oral every 6 hours PRN For Temp greater than 38 C (100.4 F)      Vital Signs Last 24 Hrs  T(C): 36.3 (2018 06:06), Max: 40.1 (2018 22:50)  T(F): 97.4 (2018 06:06), Max: 104.1 (2018 22:50)  HR: 73 (2018 06:06) (73 - 95)  BP: 104/59 (2018 06:06) (100/59 - 137/84)  BP(mean): --  RR: 18 (2018 06:06) (18 - 18)  SpO2: 99% (2018 14:46) (98% - 99%)    PHYSICAL EXAM:    Constitutional: NAD, well-groomed, well-developed  HEENT: PERRLA, EOMI, Normal Hearing, MMM  Neck: No LAD, No JVD  Back: Normal spine flexure, No CVA tenderness  Respiratory: CTAB/L  Cardiovascular: S1 and S2, RRR, no M/G/R  Gastrointestinal: BS+, soft, NT/ND  Extremities: No peripheral edema  Vascular: 2+ peripheral pulses  Neurological: A/O x 3, no focal deficits    LABS:                        10.7   8.28  )-----------( 180      ( 2018 06:00 )             32.1     04-26    140  |  102  |  15  ----------------------------<  77  4.4   |  28  |  0.9    Ca    8.1<L>      2018 06:00  Phos  4.7     -  Mg     2.1     -26    TPro  5.6<L>  /  Alb  3.3<L>  /  TBili  <0.2  /  DBili  x   /  AST  14  /  ALT  13  /  AlkPhos  49  04-26    PT/INR - ( 2018 11:20 )   PT: 11.90 sec;   INR: 1.10 ratio         PTT - ( 2018 11:20 )  PTT:30.2 sec  Urinalysis Basic - ( 2018 11:20 )    Color: Yellow / Appearance: Clear / S.025 / pH: x  Gluc: x / Ketone: Trace  / Bili: Small / Urobili: 1.0 mg/dL   Blood: x / Protein: 30 mg/dL / Nitrite: Negative   Leuk Esterase: Negative / RBC: x / WBC x   Sq Epi: x / Non Sq Epi: x / Bacteria: Few      Drug Screen Urine:  Alcohol Level  Alcohol, Blood: <10 mg/dL (18 @ 11:20)        RADIOLOGY & ADDITIONAL STUDIES:
History of Present Illness: 	  32 year old Male with h/o PTSD, undomiciled (currently staying in a shelter), single, unemployed (admits to stealing to maintain his existence) male with opioid use disorder, on MMTP ([facility] recovery [facility] at [facility] [phone] currently on 165mg of Methadone daily), sedative-hypnotic use disorder, Cannibus use disorder, ? Bipolar disorder, remote history of OCD and Tourette's as a child. He was sent in from Intake today due to fevers of 102 and lower extremity swelling and erythema concerning for cellulitis. Further questioning reveals a suicidal patient with suicidal ideations and attempts to cut his right wrist with a piece of glass and threatening to jump off the 7th floor of his mother apartment if discharged home. He is estranged from his family.    pt is known to psych service and reviewed and referred to prior chart notes.    pt reports after leaving [facility] last week his plans did not work out due to homelessness. lost his medications relapsed using drugs and is unable to care for himself. came back to intake with expectation of detox and rehab and long term treatment for drug dependency. he attempted self injurious behavior to seek attention. admits this is his pattern to get help. currently calm cooperative, denies nay suicidal ideations and wants to participate and get help. denies any hallucinations. no delusions.     long hx of poly substance dependence, bipolar disorder multiple [facility] detox and rehab. non compliance with aftercare.    alert ox3 mood depressed no psychosis no threat to self or others i/j limited.

## 2018-04-27 DIAGNOSIS — L03.311 CELLULITIS OF ABDOMINAL WALL: ICD-10-CM

## 2018-04-27 RX ORDER — NYSTATIN CREAM 100000 [USP'U]/G
1 CREAM TOPICAL
Qty: 0 | Refills: 0 | Status: DISCONTINUED | OUTPATIENT
Start: 2018-04-27 | End: 2018-04-28

## 2018-04-27 RX ORDER — METHADONE HYDROCHLORIDE 40 MG/1
165 TABLET ORAL DAILY
Qty: 0 | Refills: 0 | Status: DISCONTINUED | OUTPATIENT
Start: 2018-04-28 | End: 2018-04-28

## 2018-04-27 RX ADMIN — SODIUM CHLORIDE 3 MILLILITER(S): 9 INJECTION INTRAMUSCULAR; INTRAVENOUS; SUBCUTANEOUS at 05:09

## 2018-04-27 RX ADMIN — Medication 750 MILLIGRAM(S): at 14:05

## 2018-04-27 RX ADMIN — NYSTATIN CREAM 1 APPLICATION(S): 100000 CREAM TOPICAL at 17:17

## 2018-04-27 RX ADMIN — Medication 750 MILLIGRAM(S): at 21:30

## 2018-04-27 RX ADMIN — GABAPENTIN 300 MILLIGRAM(S): 400 CAPSULE ORAL at 13:31

## 2018-04-27 RX ADMIN — Medication 32.4 MILLIGRAM(S): at 23:03

## 2018-04-27 RX ADMIN — Medication 32.4 MILLIGRAM(S): at 17:17

## 2018-04-27 RX ADMIN — METHADONE HYDROCHLORIDE 165 MILLIGRAM(S): 40 TABLET ORAL at 07:52

## 2018-04-27 RX ADMIN — GABAPENTIN 300 MILLIGRAM(S): 400 CAPSULE ORAL at 05:16

## 2018-04-27 RX ADMIN — DIVALPROEX SODIUM 500 MILLIGRAM(S): 500 TABLET, DELAYED RELEASE ORAL at 17:17

## 2018-04-27 RX ADMIN — LACTULOSE 20 GRAM(S): 10 SOLUTION ORAL at 05:16

## 2018-04-27 RX ADMIN — Medication 32.4 MILLIGRAM(S): at 12:48

## 2018-04-27 RX ADMIN — Medication 32.4 MILLIGRAM(S): at 00:05

## 2018-04-27 RX ADMIN — PANTOPRAZOLE SODIUM 40 MILLIGRAM(S): 20 TABLET, DELAYED RELEASE ORAL at 09:56

## 2018-04-27 RX ADMIN — SODIUM CHLORIDE 3 MILLILITER(S): 9 INJECTION INTRAMUSCULAR; INTRAVENOUS; SUBCUTANEOUS at 14:06

## 2018-04-27 RX ADMIN — DIVALPROEX SODIUM 500 MILLIGRAM(S): 500 TABLET, DELAYED RELEASE ORAL at 05:16

## 2018-04-27 RX ADMIN — SODIUM CHLORIDE 3 MILLILITER(S): 9 INJECTION INTRAMUSCULAR; INTRAVENOUS; SUBCUTANEOUS at 21:28

## 2018-04-27 RX ADMIN — GABAPENTIN 300 MILLIGRAM(S): 400 CAPSULE ORAL at 21:30

## 2018-04-27 RX ADMIN — Medication 750 MILLIGRAM(S): at 06:54

## 2018-04-27 RX ADMIN — Medication 32.4 MILLIGRAM(S): at 05:17

## 2018-04-27 NOTE — PROGRESS NOTE ADULT - PROBLEM SELECTOR PLAN 4
Continue current medications.  Discontinued 1:1 observation. Cleared by Psych.  Transfer to [facility] when medically stable. Continue current medications.  Discontinued 1:1 observation. Cleared by Psych.  Transfer to Inpatient detox when medically stable.

## 2018-04-27 NOTE — PROGRESS NOTE ADULT - PROBLEM SELECTOR PLAN 1
ID consulted: recommends Topical antifungal cream for the feet and cephalexin 750 mg q8hr for 10 days.  Tylenol/Motrin prn for fevers.

## 2018-04-27 NOTE — PROGRESS NOTE ADULT - PROBLEM SELECTOR PLAN 3
Continue Methadone, dose verified.  Detox following: started on Phenobarbital protocol for Benzodiazepine Detox.  Nicotine Patch. Continue Methadone, dose verified.  Detox following: started on Phenobarbital protocol for Benzodiazepine Detox.  Nicotine Patch.  Transfer to Inpatient detox when medically stable.

## 2018-04-27 NOTE — PROGRESS NOTE ADULT - SUBJECTIVE AND OBJECTIVE BOX
infectious diseases progress note:  JACK FITZGERALD is a 32yMale patient    CELLULITIS; DEPRESSION, UNSPECIFIED DEPRESSION TYPE; DRUG ABUSE    Fungal dermatitis  Suicidal ideation  Polysubstance abuse  Depression, unspecified depression type  Cellulitis      ROS:  not relevant     Allergies    Haldol (Dystonic RXN)  Talwin (Unknown)    Intolerances        ANTIBIOTICS/RELEVANT:  antimicrobials  cephalexin 750 milliGRAM(s) Oral three times a day    immunologic:    OTHER:  acetaminophen   Tablet 650 milliGRAM(s) Oral every 6 hours PRN  clotrimazole 1% Cream 1 Application(s) Topical two times a day  diVALproex  milliGRAM(s) Oral every 12 hours  enoxaparin Injectable 40 milliGRAM(s) SubCutaneous every 24 hours  gabapentin 300 milliGRAM(s) Oral every 8 hours  ibuprofen  Tablet 600 milliGRAM(s) Oral every 6 hours PRN  lactulose Syrup 20 Gram(s) Oral two times a day  methadone    Tablet 165 milliGRAM(s) Oral daily  nicotine - 21 mG/24Hr(s) Patch 1 patch Transdermal daily  pantoprazole    Tablet 40 milliGRAM(s) Oral before breakfast  PHENobarbital 32.4 milliGRAM(s) Oral every 6 hours  PHENobarbital   Oral   sodium chloride 0.9% lock flush 3 milliLiter(s) IV Push every 8 hours  sodium chloride 0.9%. 1000 milliLiter(s) IV Continuous <Continuous>      Objective:  T(F): 96 (18 @ 05:46), Max: 101.9 (18 @ 13:52)  HR: 55 (18 @ 05:15) (55 - 94)  BP: 114/63 (18 @ 05:15) (98/57 - 116/66)  RR: 16 (18 @ 22:46) (16 - 16)  SpO2: --    PHYSICAL EXAM:  Constitutional:Well-developed, well nourished  Eyes:MICHAEL, EOMI  Ear/Nose/Throat: no oral lesion, no sinus tenderness on percussion	  Neck:no JVD, no lymphadenopathy, supple  Respiratory: CTA brett  Cardiovascular: S1S2 RRR,  Gastrointestinal:soft, (+) BS, no HSM. improved erythema - right LQ   Extremities:no phlebitis. tinea pedis ++      LABS:                        10.7   8.28  )-----------( 180      ( 2018 06:00 )             32.1         140  |  102  |  15  ----------------------------<  77  4.4   |  28  |  0.9    Ca    8.1<L>      2018 06:00  Phos  4.7       Mg     2.1         TPro  5.6<L>  /  Alb  3.3<L>  /  TBili  <0.2  /  DBili  x   /  AST  14  /  ALT  13  /  AlkPhos  49      PT/INR - ( 2018 11:20 )   PT: 11.90 sec;   INR: 1.10 ratio         PTT - ( 2018 11:20 )  PTT:30.2 sec  Urinalysis Basic - ( 2018 11:20 )    Color: Yellow / Appearance: Clear / S.025 / pH: x  Gluc: x / Ketone: Trace  / Bili: Small / Urobili: 1.0 mg/dL   Blood: x / Protein: 30 mg/dL / Nitrite: Negative   Leuk Esterase: Negative / RBC: x / WBC x   Sq Epi: x / Non Sq Epi: x / Bacteria: Few          MICROBIOLOGY:    Culture - Blood (collected 18 @ 19:16)  Source: .Blood None  Preliminary Report (18 @ 03:01):    No growth to date.    Culture - Blood (collected 18 @ 11:20)  Source: .Blood Blood  Preliminary Report (18 @ 23:01):    No growth to date.    Culture - Blood (collected 18 @ 11:20)  Source: .Blood Blood  Preliminary Report (18 @ 23:01):    No growth to date.

## 2018-04-27 NOTE — PROGRESS NOTE ADULT - ASSESSMENT
32 year old Male with h/o PTSD, undomiciled (currently staying in a shelter), single, unemployed (admits to stealing to maintain his existence) male with opioid use disorder, on MMTP ([facility] and recovery [facility] at [facility] [phone] currently on 165mg of Methadone daily), sedative-hypnotic use disorder, Cannibus use disorder, ? Bipolar disorder, remote history of OCD and Tourette's as a child. He was sent in from Intake today due to fevers of 102 and lower extremity swelling and erythema concerning for cellulitis. Further questioning reveals a suicidal patient with suicidal ideations and attempts to cut his right wrist with a piece of glass and threatening to jump off the 7th floor of his mother apartment if discharged home. He is estranged from his family. Review of prior ED records shows a similar pattern of attempts at wrist slashing. He was evaluated by Dr. Patel on 4/6/18 and medications adjustments made.

## 2018-04-27 NOTE — PROGRESS NOTE ADULT - SUBJECTIVE AND OBJECTIVE BOX
JACK FITZGERALD  32y  Male    Patient is a 32y old  Male who presents with a chief complaint of Fever (25 Apr 2018 16:40)      INTERVAL HPI/OVERNIGHT EVENTS:  No new complaints. Pain the the feet on ambulation but feels better. Tmax 101.9.    REVIEW OF SYSTEMS:  CONSTITUTIONAL: + fever, No weight loss, or fatigue  EYES: No eye pain, visual disturbances, or discharge  ENMT:  No difficulty hearing, tinnitus, vertigo; No sinus or throat pain  NECK: No pain or stiffness  RESPIRATORY: No cough, wheezing, chills or hemoptysis; No shortness of breath  CARDIOVASCULAR: No chest pain, palpitations, dizziness, or leg swelling  GASTROINTESTINAL: No abdominal or epigastric pain. No nausea, vomiting, or hematemesis; No diarrhea or constipation. No melena or hematochezia.  GENITOURINARY: No dysuria, frequency, hematuria, or incontinence  NEUROLOGICAL: No headaches, memory loss, loss of strength, numbness, or tremors  SKIN: Bilateral feet ulceration and erythema on the right lower anterior abdominal wall now resolving.  LYMPH NODES: No enlarged glands  ENDOCRINE: No heat or cold intolerance; No hair loss  MUSCULOSKELETAL: Bilateral leg pain and swelling with erythema now resolving.  PSYCHIATRIC: No depression, anxiety, mood swings, or difficulty sleeping  HEME/LYMPH: No easy bruising, or bleeding gums      VITALS:  T(C): 35.6 (27 Apr 2018 05:46), Max: 38.8 (26 Apr 2018 13:52)  T(F): 96 (27 Apr 2018 05:46), Max: 101.9 (26 Apr 2018 13:52)  HR: 55 (27 Apr 2018 05:15) (55 - 94)  BP: 114/63 (27 Apr 2018 05:15) (98/57 - 116/66)  BP(mean): --  RR: 16 (26 Apr 2018 22:46) (16 - 16)  SpO2: --    PHYSICAL EXAM:  GENERAL: NAD, well-developed  HEAD:  Atraumatic, Normocephalic  EYES: EOMI, PERRLA, conjunctiva and sclera clear  ENMT: Moist mucous membranes  NECK: Supple, Normal thyroid  NERVOUS SYSTEM:  Alert & Oriented X3, Good concentration; Motor Strength 5/5 B/L upper and lower extremities  CHEST/LUNG: Clear to auscultation bilaterally; No rales, rhonchi, wheezing, or rubs  HEART: Regular rate and rhythm; No murmurs, rubs, or gallops  ABDOMEN: Soft, Nontender, Nondistended; Bowel sounds present  EXTREMITIES:  2+ Peripheral Pulses, No clubbing, cyanosis, or edema  LYMPH: No cervical lymphadenopathy noted  SKIN: Bilateral feet erythema and pustular lesions resolving: now dry and scaly, no tenderness.    Consultant(s) Notes Reviewed:  [x ] YES  [ ] NO  Care Discussed with Consultants/Other Providers [ x] YES  [ ] NO    LABS:                 Urinalysis (04.25.18 @ 11:20)    Glucose Qualitative, Urine: Negative mg/dL    Blood, Urine: Negative    pH Urine: 6.5    Color: Yellow    Urine Appearance: Clear    Bilirubin: Small    Ketone - Urine: Trace    Specific Gravity: 1.025    Protein, Urine: 30 mg/dL    Urobilinogen: 1.0 mg/dL    Nitrite: Negative    Leukocyte Esterase Concentration: Negative      MICROBIOLOGY:   Culture - Blood (04.25.18 @ 19:16)    Specimen Source: .Blood None    Culture Results: No growth to date.      RADIOLOGY & ADDITIONAL TESTS:  X-ray Chest 2 Views PA/Lat (04.25.18 @ 11:35)     No radiographic evidence of acute cardiopulmonary disease.      VA Duplex Lower Ext Vein Scan, Bilat (04.25.18 @ 12:06)   No evidence of deep venous thrombosis or superficial thrombophlebitis in   the bilateral lower extremities.    Imaging Personally Reviewed:  [x] YES  [ ] NO    MEDICATIONS  (STANDING):  cephalexin 750 milliGRAM(s) Oral three times a day  clotrimazole 1% Cream 1 Application(s) Topical two times a day  diVALproex  milliGRAM(s) Oral every 12 hours  enoxaparin Injectable 40 milliGRAM(s) SubCutaneous every 24 hours  gabapentin 300 milliGRAM(s) Oral every 8 hours  lactulose Syrup 20 Gram(s) Oral two times a day  methadone    Tablet 165 milliGRAM(s) Oral daily  nicotine - 21 mG/24Hr(s) Patch 1 patch Transdermal daily  pantoprazole    Tablet 40 milliGRAM(s) Oral before breakfast  PHENobarbital 32.4 milliGRAM(s) Oral every 6 hours  PHENobarbital   Oral   sodium chloride 0.9% lock flush 3 milliLiter(s) IV Push every 8 hours  sodium chloride 0.9%. 1000 milliLiter(s) (75 mL/Hr) IV Continuous <Continuous>    MEDICATIONS  (PRN):  acetaminophen   Tablet 650 milliGRAM(s) Oral every 6 hours PRN For Temp greater than 38 C (100.4 F)  ibuprofen  Tablet 600 milliGRAM(s) Oral every 6 hours PRN fever        HEALTH ISSUES - PROBLEM Dx:  Fungal dermatitis  Suicidal ideation  Polysubstance abuse  Depression, unspecified depression type  Cellulitis JACK FITZGERALD  32y  Male    Patient is a 32y old  Male who presents with a chief complaint of Fever (25 Apr 2018 16:40)      INTERVAL HPI/OVERNIGHT EVENTS:  No new complaints. Pain the the feet on ambulation but feels better. Tmax 101.9.    REVIEW OF SYSTEMS:  CONSTITUTIONAL: + fever, No weight loss, or fatigue  EYES: No eye pain, visual disturbances, or discharge  ENMT:  No difficulty hearing, tinnitus, vertigo; No sinus or throat pain  NECK: No pain or stiffness  RESPIRATORY: No cough, wheezing, chills or hemoptysis; No shortness of breath  CARDIOVASCULAR: No chest pain, palpitations, dizziness, or leg swelling  GASTROINTESTINAL: No abdominal or epigastric pain. No nausea, vomiting, or hematemesis; No diarrhea or constipation. No melena or hematochezia.  GENITOURINARY: No dysuria, frequency, hematuria, or incontinence  NEUROLOGICAL: No headaches, memory loss, loss of strength, numbness, or tremors  SKIN: Bilateral feet ulceration and erythema on the right lower anterior abdominal wall now resolving.  LYMPH NODES: No enlarged glands  ENDOCRINE: No heat or cold intolerance; No hair loss  MUSCULOSKELETAL: Bilateral leg pain and swelling with erythema now resolving.  PSYCHIATRIC: No depression, anxiety, mood swings, or difficulty sleeping  HEME/LYMPH: No easy bruising, or bleeding gums      VITALS:  T(C): 35.6 (27 Apr 2018 05:46), Max: 38.8 (26 Apr 2018 13:52)  T(F): 96 (27 Apr 2018 05:46), Max: 101.9 (26 Apr 2018 13:52)  HR: 55 (27 Apr 2018 05:15) (55 - 94)  BP: 114/63 (27 Apr 2018 05:15) (98/57 - 116/66)  BP(mean): --  RR: 16 (26 Apr 2018 22:46) (16 - 16)  SpO2: --    PHYSICAL EXAM:  GENERAL: NAD, well-developed  HEAD:  Atraumatic, Normocephalic  EYES: EOMI, PERRLA, conjunctiva and sclera clear  ENMT: Moist mucous membranes  NECK: Supple, Normal thyroid  NERVOUS SYSTEM:  Alert & Oriented X3, Good concentration; Motor Strength 5/5 B/L upper and lower extremities  CHEST/LUNG: Clear to auscultation bilaterally; No rales, rhonchi, wheezing, or rubs  HEART: Regular rate and rhythm; No murmurs, rubs, or gallops  ABDOMEN: Soft, Nontender, Nondistended; Bowel sounds present  EXTREMITIES:  2+ Peripheral Pulses, No clubbing, cyanosis, or edema  LYMPH: No cervical lymphadenopathy noted  SKIN: Bilateral feet erythema and pustular lesions resolving: now dry and scaly, no tenderness.    Consultant(s) Notes Reviewed:  [x ] YES  [ ] NO  Care Discussed with Consultants/Other Providers [ x] YES  [ ] NO    LABS:                        10.7   8.28  )-----------( 180      ( 26 Apr 2018 06:00 )             32.1     04-26    140  |  102  |  15  ----------------------------<  77  4.4   |  28  |  0.9    Ca    8.1<L>      26 Apr 2018 06:00  Phos  4.7     04-26  Mg     2.1     04-26    TPro  5.6<L>  /  Alb  3.3<L>  /  TBili  <0.2  /  DBili  x   /  AST  14  /  ALT  13  /  AlkPhos  49  04-26                Urinalysis (04.25.18 @ 11:20)    Glucose Qualitative, Urine: Negative mg/dL    Blood, Urine: Negative    pH Urine: 6.5    Color: Yellow    Urine Appearance: Clear    Bilirubin: Small    Ketone - Urine: Trace    Specific Gravity: 1.025    Protein, Urine: 30 mg/dL    Urobilinogen: 1.0 mg/dL    Nitrite: Negative    Leukocyte Esterase Concentration: Negative      MICROBIOLOGY:   Culture - Blood (04.25.18 @ 19:16)    Specimen Source: .Blood None    Culture Results: No growth to date.      RADIOLOGY & ADDITIONAL TESTS:  X-ray Chest 2 Views PA/Lat (04.25.18 @ 11:35)     No radiographic evidence of acute cardiopulmonary disease.      VA Duplex Lower Ext Vein Scan, Bilat (04.25.18 @ 12:06)   No evidence of deep venous thrombosis or superficial thrombophlebitis in   the bilateral lower extremities.    Imaging Personally Reviewed:  [x] YES  [ ] NO    MEDICATIONS  (STANDING):  cephalexin 750 milliGRAM(s) Oral three times a day  clotrimazole 1% Cream 1 Application(s) Topical two times a day  diVALproex  milliGRAM(s) Oral every 12 hours  enoxaparin Injectable 40 milliGRAM(s) SubCutaneous every 24 hours  gabapentin 300 milliGRAM(s) Oral every 8 hours  lactulose Syrup 20 Gram(s) Oral two times a day  methadone    Tablet 165 milliGRAM(s) Oral daily  nicotine - 21 mG/24Hr(s) Patch 1 patch Transdermal daily  pantoprazole    Tablet 40 milliGRAM(s) Oral before breakfast  PHENobarbital 32.4 milliGRAM(s) Oral every 6 hours  PHENobarbital   Oral   sodium chloride 0.9% lock flush 3 milliLiter(s) IV Push every 8 hours  sodium chloride 0.9%. 1000 milliLiter(s) (75 mL/Hr) IV Continuous <Continuous>    MEDICATIONS  (PRN):  acetaminophen   Tablet 650 milliGRAM(s) Oral every 6 hours PRN For Temp greater than 38 C (100.4 F)  ibuprofen  Tablet 600 milliGRAM(s) Oral every 6 hours PRN fever        HEALTH ISSUES - PROBLEM Dx:  Fungal dermatitis  Suicidal ideation  Polysubstance abuse  Depression, unspecified depression type  Cellulitis

## 2018-04-28 ENCOUNTER — TRANSCRIPTION ENCOUNTER (OUTPATIENT)
Age: 33
End: 2018-04-28

## 2018-04-28 ENCOUNTER — INPATIENT (INPATIENT)
Facility: HOSPITAL | Age: 33
LOS: 1 days | Discharge: NURSING, REHAB OR LONG-TERM CARE | End: 2018-04-30
Attending: INTERNAL MEDICINE | Admitting: INTERNAL MEDICINE

## 2018-04-28 VITALS
DIASTOLIC BLOOD PRESSURE: 79 MMHG | WEIGHT: 244.93 LBS | HEART RATE: 89 BPM | SYSTOLIC BLOOD PRESSURE: 130 MMHG | HEIGHT: 74 IN | TEMPERATURE: 99 F | RESPIRATION RATE: 16 BRPM

## 2018-04-28 VITALS
TEMPERATURE: 97 F | SYSTOLIC BLOOD PRESSURE: 97 MMHG | HEART RATE: 53 BPM | RESPIRATION RATE: 16 BRPM | DIASTOLIC BLOOD PRESSURE: 54 MMHG

## 2018-04-28 DIAGNOSIS — F32.9 MAJOR DEPRESSIVE DISORDER, SINGLE EPISODE, UNSPECIFIED: ICD-10-CM

## 2018-04-28 DIAGNOSIS — F11.20 OPIOID DEPENDENCE, UNCOMPLICATED: ICD-10-CM

## 2018-04-28 DIAGNOSIS — F31.9 BIPOLAR DISORDER, UNSPECIFIED: ICD-10-CM

## 2018-04-28 DIAGNOSIS — L03.119 CELLULITIS OF UNSPECIFIED PART OF LIMB: ICD-10-CM

## 2018-04-28 DIAGNOSIS — F43.10 POST-TRAUMATIC STRESS DISORDER, UNSPECIFIED: ICD-10-CM

## 2018-04-28 DIAGNOSIS — F19.10 OTHER PSYCHOACTIVE SUBSTANCE ABUSE, UNCOMPLICATED: ICD-10-CM

## 2018-04-28 LAB
APPEARANCE UR: CLEAR — SIGNIFICANT CHANGE UP
BILIRUB UR-MCNC: NEGATIVE — SIGNIFICANT CHANGE UP
COLOR SPEC: YELLOW — SIGNIFICANT CHANGE UP
DIFF PNL FLD: NEGATIVE — SIGNIFICANT CHANGE UP
DRUG SCREEN 1, URINE RESULT: SIGNIFICANT CHANGE UP
GLUCOSE UR QL: NEGATIVE MG/DL — SIGNIFICANT CHANGE UP
KETONES UR-MCNC: NEGATIVE — SIGNIFICANT CHANGE UP
LEUKOCYTE ESTERASE UR-ACNC: NEGATIVE — SIGNIFICANT CHANGE UP
NITRITE UR-MCNC: NEGATIVE — SIGNIFICANT CHANGE UP
PH UR: 7 — SIGNIFICANT CHANGE UP (ref 5–8)
PROT UR-MCNC: NEGATIVE MG/DL — SIGNIFICANT CHANGE UP
SP GR SPEC: 1.02 — SIGNIFICANT CHANGE UP (ref 1.01–1.03)
UROBILINOGEN FLD QL: 1 MG/DL (ref 0.2–0.2)

## 2018-04-28 RX ORDER — CEPHALEXIN 500 MG
1 CAPSULE ORAL
Qty: 0 | Refills: 0 | COMMUNITY
Start: 2018-04-28

## 2018-04-28 RX ORDER — PHENOBARBITAL 60 MG
32.4 TABLET ORAL EVERY 6 HOURS
Qty: 0 | Refills: 0 | Status: DISCONTINUED | OUTPATIENT
Start: 2018-04-28 | End: 2018-04-28

## 2018-04-28 RX ORDER — DIVALPROEX SODIUM 500 MG/1
500 TABLET, DELAYED RELEASE ORAL EVERY 12 HOURS
Qty: 0 | Refills: 0 | Status: DISCONTINUED | OUTPATIENT
Start: 2018-04-28 | End: 2018-04-28

## 2018-04-28 RX ORDER — DIVALPROEX SODIUM 500 MG/1
1000 TABLET, DELAYED RELEASE ORAL AT BEDTIME
Qty: 0 | Refills: 0 | Status: DISCONTINUED | OUTPATIENT
Start: 2018-04-28 | End: 2018-04-30

## 2018-04-28 RX ORDER — DIVALPROEX SODIUM 500 MG/1
1 TABLET, DELAYED RELEASE ORAL
Qty: 0 | Refills: 0 | COMMUNITY
Start: 2018-04-28

## 2018-04-28 RX ORDER — PHENOBARBITAL 60 MG
0 TABLET ORAL
Qty: 0 | Refills: 0 | COMMUNITY
Start: 2018-04-28

## 2018-04-28 RX ORDER — LACTULOSE 10 G/15ML
30 SOLUTION ORAL
Qty: 0 | Refills: 0 | COMMUNITY
Start: 2018-04-28

## 2018-04-28 RX ORDER — NYSTATIN CREAM 100000 [USP'U]/G
1 CREAM TOPICAL
Qty: 0 | Refills: 0 | Status: DISCONTINUED | OUTPATIENT
Start: 2018-04-28 | End: 2018-04-30

## 2018-04-28 RX ORDER — NICOTINE POLACRILEX 2 MG
1 GUM BUCCAL DAILY
Qty: 0 | Refills: 0 | Status: DISCONTINUED | OUTPATIENT
Start: 2018-04-28 | End: 2018-04-30

## 2018-04-28 RX ORDER — ACETAMINOPHEN 500 MG
650 TABLET ORAL EVERY 8 HOURS
Qty: 0 | Refills: 0 | Status: DISCONTINUED | OUTPATIENT
Start: 2018-04-28 | End: 2018-04-30

## 2018-04-28 RX ORDER — MIRTAZAPINE 45 MG/1
30 TABLET, ORALLY DISINTEGRATING ORAL AT BEDTIME
Qty: 0 | Refills: 0 | Status: DISCONTINUED | OUTPATIENT
Start: 2018-04-28 | End: 2018-04-30

## 2018-04-28 RX ORDER — METHADONE HYDROCHLORIDE 40 MG/1
165 TABLET ORAL DAILY
Qty: 0 | Refills: 0 | Status: DISCONTINUED | OUTPATIENT
Start: 2018-04-28 | End: 2018-04-28

## 2018-04-28 RX ORDER — DIVALPROEX SODIUM 500 MG/1
750 TABLET, DELAYED RELEASE ORAL DAILY
Qty: 0 | Refills: 0 | Status: DISCONTINUED | OUTPATIENT
Start: 2018-04-22 | End: 2018-04-30

## 2018-04-28 RX ORDER — ACETAMINOPHEN 500 MG
2 TABLET ORAL
Qty: 0 | Refills: 0 | COMMUNITY
Start: 2018-04-28

## 2018-04-28 RX ORDER — METHADONE HYDROCHLORIDE 40 MG/1
160 TABLET ORAL DAILY
Qty: 0 | Refills: 0 | Status: DISCONTINUED | OUTPATIENT
Start: 2018-04-28 | End: 2018-04-30

## 2018-04-28 RX ORDER — GABAPENTIN 400 MG/1
1 CAPSULE ORAL
Qty: 0 | Refills: 0 | COMMUNITY
Start: 2018-04-28

## 2018-04-28 RX ORDER — NICOTINE POLACRILEX 2 MG
0 GUM BUCCAL
Qty: 0 | Refills: 0 | COMMUNITY
Start: 2018-04-28

## 2018-04-28 RX ORDER — PHENOBARBITAL 60 MG
32.4 TABLET ORAL EVERY 4 HOURS
Qty: 0 | Refills: 0 | Status: DISCONTINUED | OUTPATIENT
Start: 2018-04-28 | End: 2018-04-30

## 2018-04-28 RX ORDER — IBUPROFEN 200 MG
1 TABLET ORAL
Qty: 0 | Refills: 0 | COMMUNITY
Start: 2018-04-28

## 2018-04-28 RX ORDER — PHENOBARBITAL 60 MG
32.4 TABLET ORAL EVERY 12 HOURS
Qty: 0 | Refills: 0 | Status: DISCONTINUED | OUTPATIENT
Start: 2018-04-29 | End: 2018-04-30

## 2018-04-28 RX ORDER — MIRTAZAPINE 45 MG/1
1 TABLET, ORALLY DISINTEGRATING ORAL
Qty: 0 | Refills: 0 | COMMUNITY

## 2018-04-28 RX ORDER — LACTULOSE 10 G/15ML
20 SOLUTION ORAL
Qty: 0 | Refills: 0 | Status: DISCONTINUED | OUTPATIENT
Start: 2018-04-28 | End: 2018-04-30

## 2018-04-28 RX ORDER — NYSTATIN CREAM 100000 [USP'U]/G
1 CREAM TOPICAL
Qty: 0 | Refills: 0 | COMMUNITY
Start: 2018-04-28

## 2018-04-28 RX ORDER — CEPHALEXIN 500 MG
750 CAPSULE ORAL EVERY 8 HOURS
Qty: 0 | Refills: 0 | Status: DISCONTINUED | OUTPATIENT
Start: 2018-04-28 | End: 2018-04-30

## 2018-04-28 RX ORDER — GABAPENTIN 400 MG/1
300 CAPSULE ORAL EVERY 8 HOURS
Qty: 0 | Refills: 0 | Status: DISCONTINUED | OUTPATIENT
Start: 2018-04-28 | End: 2018-04-28

## 2018-04-28 RX ORDER — GABAPENTIN 400 MG/1
600 CAPSULE ORAL THREE TIMES A DAY
Qty: 0 | Refills: 0 | Status: DISCONTINUED | OUTPATIENT
Start: 2018-04-28 | End: 2018-04-30

## 2018-04-28 RX ORDER — METHADONE HYDROCHLORIDE 40 MG/1
5 TABLET ORAL DAILY
Qty: 0 | Refills: 0 | Status: DISCONTINUED | OUTPATIENT
Start: 2018-04-28 | End: 2018-04-30

## 2018-04-28 RX ADMIN — NYSTATIN CREAM 1 APPLICATION(S): 100000 CREAM TOPICAL at 20:50

## 2018-04-28 RX ADMIN — METHADONE HYDROCHLORIDE 165 MILLIGRAM(S): 40 TABLET ORAL at 08:55

## 2018-04-28 RX ADMIN — DIVALPROEX SODIUM 500 MILLIGRAM(S): 500 TABLET, DELAYED RELEASE ORAL at 05:31

## 2018-04-28 RX ADMIN — Medication 750 MILLIGRAM(S): at 05:31

## 2018-04-28 RX ADMIN — NYSTATIN CREAM 1 APPLICATION(S): 100000 CREAM TOPICAL at 05:33

## 2018-04-28 RX ADMIN — Medication 32.4 MILLIGRAM(S): at 17:58

## 2018-04-28 RX ADMIN — MIRTAZAPINE 30 MILLIGRAM(S): 45 TABLET, ORALLY DISINTEGRATING ORAL at 20:54

## 2018-04-28 RX ADMIN — GABAPENTIN 300 MILLIGRAM(S): 400 CAPSULE ORAL at 13:50

## 2018-04-28 RX ADMIN — PANTOPRAZOLE SODIUM 40 MILLIGRAM(S): 20 TABLET, DELAYED RELEASE ORAL at 05:31

## 2018-04-28 RX ADMIN — Medication 32.4 MILLIGRAM(S): at 05:30

## 2018-04-28 RX ADMIN — Medication 32.4 MILLIGRAM(S): at 13:50

## 2018-04-28 RX ADMIN — SODIUM CHLORIDE 3 MILLILITER(S): 9 INJECTION INTRAMUSCULAR; INTRAVENOUS; SUBCUTANEOUS at 05:32

## 2018-04-28 RX ADMIN — DIVALPROEX SODIUM 1000 MILLIGRAM(S): 500 TABLET, DELAYED RELEASE ORAL at 20:50

## 2018-04-28 RX ADMIN — GABAPENTIN 300 MILLIGRAM(S): 400 CAPSULE ORAL at 05:31

## 2018-04-28 RX ADMIN — Medication 750 MILLIGRAM(S): at 13:50

## 2018-04-28 RX ADMIN — Medication 32.4 MILLIGRAM(S): at 16:34

## 2018-04-28 RX ADMIN — GABAPENTIN 600 MILLIGRAM(S): 400 CAPSULE ORAL at 20:55

## 2018-04-28 RX ADMIN — LACTULOSE 20 GRAM(S): 10 SOLUTION ORAL at 05:33

## 2018-04-28 RX ADMIN — Medication 750 MILLIGRAM(S): at 21:01

## 2018-04-28 NOTE — BEHAVIORAL HEALTH ASSESSMENT NOTE - NSBHCHARTREVIEWLAB_PSY_A_CORE FT
26 Apr 2018 06:00  Sodium 140 [135 - 146]  Chloride 102 [98 - 110]  BUN 15 [10 - 20]  Glucose 77 [70 - 99]  Potassium 4.4 [3.5 - 5.0]  Anion Gap 10 [7 - 14]  Carbon dioxide 28 [17 - 32]  Creatinine 0.9 [0.7 - 1.5]      Ca    8.1<L> [8.5 - 10.1]      26 Apr 2018 06:00    Phos  4.7 [2.1 - 4.9]     26 Apr 2018 06:00    Mg     2.1 [1.8 - 2.4]     26 Apr 2018 06:00    26 Apr 2018 06:00  TPro 5.6<L> [6.0 - 8.0]  Alb  3.3<L> [3.5 - 5.2]   TBili <0.2 [0.2 - 1.2]   DBili x       AST  14 [0 - 41]  ALT  13 [0 - 41]   AlkPhos 49 [30 - 115]

## 2018-04-28 NOTE — H&P ADULT - HISTORY OF PRESENT ILLNESS
· Hospital Course		            32 year old Male with h/o PTSD, undomiciled (currently staying in a shelter), single, unemployed (admits to stealing to maintain his existence) male with opioid use disorder, on MMTP ([facility] [phone] currently on 165mg of Methadone daily), sedative-hypnotic use disorder, Cannibus use disorder, ? Bipolar disorder, remote history of OCD and Tourette's as a child. He was sent in from Intake 4/25/18 due to fevers of 102F and lower extremity swelling and erythema concerning for cellulitis. Further questioning reveals a suicidal patient with suicidal ideations and attempts to cut his right wrist with a piece of glass and threatening to jump off the 7th floor of his mother apartment if discharged home. He is estranged from his family. Review of prior ED records shows a similar pattern of attempts at wrist slashing. He was evaluated by Dr. Patel on 4/6/18 and medications adjustments made. Admitted to medical floors due to bilateral extremity cellulitis with fevers, IV antibiotic started. Seen by ID who recommended 10 days total Rx. Now transferred to detox to complete Phenobarbital protocol.

## 2018-04-28 NOTE — BEHAVIORAL HEALTH ASSESSMENT NOTE - NSBHCHARTREVIEWVS_PSY_A_CORE FT
T(C): 36.8 (04-28-18 @ 17:57), Max: 37.1 (04-28-18 @ 11:34)  HR: 70 (04-28-18 @ 17:57) (53 - 89)  BP: 123/72 (04-28-18 @ 17:57) (97/54 - 130/79)  RR: 18 (04-28-18 @ 17:57) (16 - 18)  SpO2: --

## 2018-04-28 NOTE — PROGRESS NOTE ADULT - PROBLEM SELECTOR PROBLEM 2
Fungal dermatitis
Depression, unspecified depression type

## 2018-04-28 NOTE — BEHAVIORAL HEALTH ASSESSMENT NOTE - NSBHCONSULTMEDS_PSY_A_CORE FT
Writer ordered for pt:  Remeron 30 mg po at bedtime for insomnia  Neurontin 600 mg po tid fro anxiety  Depakote 1750 mg po.d fro david stabilization
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
## 2018-04-28 NOTE — PROGRESS NOTE ADULT - PROBLEM SELECTOR PLAN 3
Continue Methadone, dose verified.  Detox following: started on Phenobarbital protocol for Benzodiazepine Detox.  Nicotine Patch.  Stable for Transfer to Inpatient detox.
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
## 2018-04-28 NOTE — DISCHARGE NOTE ADULT - CARE PLAN
Principal Discharge DX:	Cellulitis  Goal:	Prevent recurrence  Assessment and plan of treatment:	Complete Antibiotics course. A total of 10 days.  Good feet hygiene.  Apply Antifungal cream twice a day. Follow up with vascular about recurrent Lower extremity edema after discharge form Detox.  Secondary Diagnosis:	Polysubstance abuse  Goal:	Prevent further use.  Assessment and plan of treatment:	Per Detox Management.

## 2018-04-28 NOTE — H&P ADULT - PROBLEM SELECTOR PLAN 2
continue Antibiotic till 5/6/18 continue Antibiotic till 5/6/18, may need to follow up with Dermatologist upon discharge

## 2018-04-28 NOTE — PROGRESS NOTE ADULT - ASSESSMENT
32 year old Male with h/o PTSD, undomiciled (currently staying in a shelter), single, unemployed (admits to stealing to maintain his existence) male with opioid use disorder, on MMTP ([facility] [phone] currently on 165mg of Methadone daily), sedative-hypnotic use disorder, Cannibus use disorder, ? Bipolar disorder, remote history of OCD and Tourette's as a child. He was sent in from Intake today due to fevers of 102 and lower extremity swelling and erythema concerning for cellulitis. Further questioning reveals a suicidal patient with suicidal ideations and attempts to cut his right wrist with a piece of glass and threatening to jump off the 7th floor of his mother apartment if discharged home. He is estranged from his family. Review of prior ED records shows a similar pattern of attempts at wrist slashing. He was evaluated by Dr. Patel on 4/6/18 and medications adjustments made.

## 2018-04-28 NOTE — H&P ADULT - NSHPSOCIALHISTORY_GEN_ALL_CORE
Tobacco use: Yes 15 yrs, 1 PPD  EtOH use: No  Illicit drug use: xanax 18 yrs, 5-6 sticks /day  Marital Status: Single

## 2018-04-28 NOTE — DISCHARGE NOTE ADULT - MEDICATION SUMMARY - MEDICATIONS TO TAKE
I will START or STAY ON the medications listed below when I get home from the hospital:    methadone 5 mg oral tablet  -- 165 milligram(s) by mouth once a day  -- Indication: For Heroin Abuse    acetaminophen 325 mg oral tablet  -- 2 tab(s) by mouth every 6 hours, As needed, For Temp greater than 38 C (100.4 F)  -- Indication: For Pain /Fever    ibuprofen 600 mg oral tablet  -- 1 tab(s) by mouth every 6 hours, As needed, fever  -- Indication: For Pain/Fever    divalproex sodium 500 mg oral delayed release tablet  -- 1 tab(s) by mouth every 12 hours  -- Indication: For Bipolar    gabapentin 300 mg oral capsule  -- 1 cap(s) by mouth every 8 hours  -- Indication: For Pain    PHENobarbital  -- Indication: For Benzodiazepine Detox    cephalexin 750 mg oral capsule  -- 1 cap(s) by mouth 3 times a day  STOP 5/6/18    -- Indication: For Cellulitis    nystatin 100,000 units/g topical cream  -- 1 application on skin 2 times a day  -- Indication: For Fungal dermatitis    lactulose 10 g/15 mL oral syrup  -- 30 milliliter(s) by mouth 2 times a day  -- Indication: For Constipation    nicotine 21 mg/24 hr transdermal film, extended release  --  by transdermal patch   -- Indication: For Nicotine Replacement

## 2018-04-28 NOTE — DISCHARGE NOTE ADULT - PROVIDER TOKENS
FREE:[LAST:[PMD],PHONE:[(   )    -],FAX:[(   )    -]],FREE:[LAST:[Vascular Center],PHONE:[(   )    -],FAX:[(   )    -]]

## 2018-04-28 NOTE — DISCHARGE NOTE ADULT - HOSPITAL COURSE
32 year old Male with h/o PTSD, undomiciled (currently staying in a shelter), single, unemployed (admits to stealing to maintain his existence) male with opioid use disorder, on MMTP ([facility] at [facility] [phone] currently on 165mg of Methadone daily), sedative-hypnotic use disorder, Cannibus use disorder, ? Bipolar disorder, remote history of OCD and Tourette's as a child. He was sent in from Intake today due to fevers of 102 and lower extremity swelling and erythema concerning for cellulitis. Further questioning reveals a suicidal patient with suicidal ideations and attempts to cut his right wrist with a piece of glass and threatening to jump off the 7th floor of his mother apartment if discharged home. He is estranged from his family. Review of prior ED records shows a similar pattern of attempts at wrist slashing. He was evaluated by Dr. Patel on 4/6/18 and medications adjustments made.     Problem/Plan - 1:  ·  Problem: Cellulitis.  Plan: ID consulted: recommends Topical antifungal cream for the feet and cephalexin 750 mg q8hr for 10 days.  Tylenol/Motrin prn for fevers.     Problem/Plan - 2:  ·  Problem: Depression, unspecified depression type.  Plan: Seen by Psych: appreciate input.  Medications adjusted.     Problem/Plan - 3:  ·  Problem: Polysubstance abuse.  Plan: Continue Methadone, dose verified.  Detox following: started on Phenobarbital protocol for Benzodiazepine Detox.  Nicotine Patch. Stable for Transfer to Inpatient detox.     Problem/Plan - 4:  ·  Problem: Suicidal ideation.  Plan: Continue current medications.  Discontinued 1:1 observation. Cleared by Psych.

## 2018-04-28 NOTE — BEHAVIORAL HEALTH ASSESSMENT NOTE - RISK ASSESSMENT
Pt denies and presents no evidence of suicidal or homicidal ideas plans or intentions at this time. Currently pt is able to care for self and presents no imminent danger to self or others.

## 2018-04-28 NOTE — DISCHARGE NOTE ADULT - PLAN OF CARE
Prevent recurrence Complete Antibiotics course. A total of 10 days.  Good feet hygiene.  Apply Antifungal cream twice a day. Follow up with vascular about recurrent Lower extremity edema after discharge form Detox. Prevent further use. Per Detox Management.

## 2018-04-28 NOTE — H&P ADULT - ATTENDING COMMENTS
Patient interviewed and examined.    Chart reviewed.    PA's H&P noted and modified, as appropriate.    Case discussed on team rounds    Following is my summary of the case.    Admitted for detox: from ____ED, ___Intake, __X__Med/Surg Floor    Alcohol____   Opioid__X___  Benzo_X__ Other_____    Substance amount, duration of use, last usage, and prior attempts at detox or rehabs, are outlined above in the H&P and discussed with patient.    Associated withdrawal symptoms presents.  Comorbid conditions noted. Chronic and Stable.    Past Medical Hx, Psych Hx, family Hx, Social Hx from H&P reviewed and NO changes.    Old medical record and medication Hx. Reviewed    Following items reviewed and addressed:  1. labs  2. EKG  3. Imaging from PACs module    Examination: no change from PA's exam.    Place on following protocol  _____Medically Managed  __X__Medically Supervised    Ciwa_____Librium taper____Ativan taper___Methadone taper___ Phenobarb taper____ Suboxone Induction____MMTP____    Narcan Kit Offered    Psych Consult __X__N/A  ___Ordered    Physical Therapy  ___X  n/a    ___  Ordered    Aftercare disposition to be addressed by counselors.    Estimated length of stay 3-5 days.

## 2018-04-28 NOTE — DISCHARGE NOTE ADULT - FINDINGS/TREATMENT
< from: [facility] Duplex Lower Ext Vein Scan, Bilat (04.25.18 @ 12:06) >      No evidence of deep venous thrombosis or superficial thrombophlebitis in   the bilateral lower extremities.    < end of copied text > < from: Xray Chest 2 Views PA/Lat (04.25.18 @ 11:35) >    No radiographic evidence of acute cardiopulmonary disease.    < end of copied text >

## 2018-04-28 NOTE — PHARMACY COMMUNICATION NOTE - COMMENTS
AS PER MICHELE ROSE PT HAS SEVERE INFECTION ON KEFLEX 750MG PO Q8H PRIOR TO ADMISSION, CONTINUATION  OF MEDS

## 2018-04-28 NOTE — CHART NOTE - NSCHARTNOTEFT_GEN_A_CORE
Called by detox floor with concern over stat Psych consult 2/2 high score on admission suicide scale.  Patient without current suicidal/homicidal ideation.  Was evaluated by Psych attending on floor 4/26 and cleared for admission to detox and started on medication therapy.  No need for stat Psych consult at this time as patient without suicidal ideations and was already seen by psych as inpatient.  Will order Psychiatry follow up routine

## 2018-04-28 NOTE — DISCHARGE NOTE ADULT - MEDICATION SUMMARY - MEDICATIONS TO CHANGE
I will SWITCH the dose or number of times a day I take the medications listed below when I get home from the hospital:    divalproex sodium 250 mg oral delayed release tablet  -- 3 tab(s) by mouth once a day    divalproex sodium 500 mg oral delayed release tablet  -- 2 tab(s) by mouth once a day (at bedtime)    gabapentin 800 mg oral tablet  -- 1 tab(s) by mouth 3 times a day

## 2018-04-28 NOTE — H&P ADULT - NSHPLABSRESULTS_GEN_ALL_CORE
Culture Results:   No growth to date. (04-26 @ 11:32)  Culture Results:   No growth to date. (04-25 @ 19:16)  Culture Results:   No growth to date. (04-25 @ 11:20)  Culture Results:   No growth to date. (04-25 @ 11:20)
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
## 2018-04-28 NOTE — BEHAVIORAL HEALTH ASSESSMENT NOTE - SUMMARY
32 y.o. undomiciled most recently staying in a shelter), single, unemployed male with opioid use disorder, on MMTP ([facility] [phone]), sedative/hypnotice use disorder, cannabis use disorder and reports pph bipolar disorder, PTSD, remote history of OCD and Tourettes as a child, significant legal hx, spent 10 yr sin alf. . Pt was admitted to medicine c sepsis and was then referred to rehab. Pt ios motivated to go to long term rehab is scared to return to his shelter, where he has made some nemise.

## 2018-04-28 NOTE — DISCHARGE NOTE ADULT - PATIENT PORTAL LINK FT
You can access the [email or web address], offered by [facility], by registering with the following website: [email or web address]

## 2018-04-28 NOTE — DISCHARGE NOTE ADULT - MEDICATION SUMMARY - MEDICATIONS TO STOP TAKING
I will STOP taking the medications listed below when I get home from the hospital:    QUEtiapine 100 mg oral tablet  -- 1 tab(s) by mouth 2 times a day    clonazePAM 1 mg oral tablet  -- 1 tab(s) by mouth 2 times a day MDD:2mg    mirtazapine 15 mg oral tablet  -- 1 tab(s) by mouth once a day (at bedtime)
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
## 2018-04-28 NOTE — PROGRESS NOTE ADULT - PROBLEM SELECTOR PLAN 2
Topical Rx    recall if needed
Continue current medications until seen by psych.
Seen by Psych: appreciate input.  Medications adjusted.
Seen by Psych: appreciate input.  Medications adjusted.

## 2018-04-28 NOTE — BEHAVIORAL HEALTH ASSESSMENT NOTE - HPI (INCLUDE ILLNESS QUALITY, SEVERITY, DURATION, TIMING, CONTEXT, MODIFYING FACTORS, ASSOCIATED SIGNS AND SYMPTOMS)
32 y.o. undomiciled (currently staying in a shelter), single, unemployed male with opioid use disorder, on MMTP (At [place] [phone]), sedative/hypnotice use disorder, cannabis use disorder and reports pph bipolar disorder, PTSD, remote history of OCD and Tourettes as a child. Pt was admitted to medicine c sepsis and the referred to rehab .

## 2018-04-28 NOTE — PROGRESS NOTE ADULT - SUBJECTIVE AND OBJECTIVE BOX
JACK FITZGERALD  32y  Male    Patient is a 32y old  Male who presents with a chief complaint of Fever (25 Apr 2018 16:40)      INTERVAL HPI/OVERNIGHT EVENTS:  No new complaints. Patient feels much better.   Afebrile for > 24 hours.     REVIEW OF SYSTEMS:  CONSTITUTIONAL: No fever, weight loss, or fatigue  EYES: No eye pain, visual disturbances, or discharge  ENMT:  No difficulty hearing, tinnitus, vertigo; No sinus or throat pain  NECK: No pain or stiffness  RESPIRATORY: No cough, wheezing, chills or hemoptysis; No shortness of breath  CARDIOVASCULAR: No chest pain, palpitations, dizziness, or leg swelling  GASTROINTESTINAL: No abdominal or epigastric pain. No nausea, vomiting, or hematemesis; No diarrhea or constipation. No melena or hematochezia.  GENITOURINARY: No dysuria, frequency, hematuria, or incontinence  NEUROLOGICAL: No headaches, memory loss, loss of strength, numbness, or tremors  SKIN: Bilateral feet ulceration and erythema on the right lower anterior abdominal wall now resolving.  LYMPH NODES: No enlarged glands  ENDOCRINE: No heat or cold intolerance; No hair loss  MUSCULOSKELETAL: Bilateral leg pain and swelling with erythema now resolving.  PSYCHIATRIC: No depression, anxiety, mood swings, or difficulty sleeping  HEME/LYMPH: No easy bruising, or bleeding gums      VITALS:  T(C): 36.1 (28 Apr 2018 06:20), Max: 36.8 (27 Apr 2018 14:12)  T(F): 96.9 (28 Apr 2018 06:20), Max: 98.2 (27 Apr 2018 14:12)  HR: 53 (28 Apr 2018 06:20) (53 - 79)  BP: 97/54 (28 Apr 2018 06:20) (97/54 - 107/62)  BP(mean): --  RR: 16 (28 Apr 2018 06:20) (16 - 16)  SpO2: --    PHYSICAL EXAM:  GENERAL: NAD, well-developed  HEAD:  Atraumatic, Normocephalic  EYES: EOMI, PERRLA, conjunctiva and sclera clear  ENMT: Moist mucous membranes  NECK: Supple, Normal thyroid  NERVOUS SYSTEM:  Alert & Oriented X3, Good concentration; Motor Strength 5/5 B/L upper and lower extremities  CHEST/LUNG: Clear to auscultation bilaterally; No rales, rhonchi, wheezing, or rubs  HEART: Regular rate and rhythm; No murmurs, rubs, or gallops  ABDOMEN: Soft, Nontender, Nondistended; Bowel sounds present  EXTREMITIES:  2+ Peripheral Pulses, No clubbing, cyanosis, or edema  LYMPH: No cervical lymphadenopathy noted  SKIN: Bilateral feet erythema and pustular lesions resolving: now dry and scaly, no tenderness.    Consultant(s) Notes Reviewed:  [x ] YES  [ ] NO  Care Discussed with Consultants/Other Providers [ x] YES  [ ] NO    LABS:                          Urinalysis (04.25.18 @ 11:20)    Glucose Qualitative, Urine: Negative mg/dL    Blood, Urine: Negative    pH Urine: 6.5    Color: Yellow    Urine Appearance: Clear    Bilirubin: Small    Ketone - Urine: Trace    Specific Gravity: 1.025    Protein, Urine: 30 mg/dL    Urobilinogen: 1.0 mg/dL    Nitrite: Negative    Leukocyte Esterase Concentration: Negative      MICROBIOLOGY:   Culture - Blood (04.25.18 @ 19:16)    Specimen Source: .Blood None    Culture Results: No growth to date.      RADIOLOGY & ADDITIONAL TESTS:  X-ray Chest 2 Views PA/Lat (04.25.18 @ 11:35)     No radiographic evidence of acute cardiopulmonary disease.      VA Duplex Lower Ext Vein Scan, Bilat (04.25.18 @ 12:06)   No evidence of deep venous thrombosis or superficial thrombophlebitis in   the bilateral lower extremities.    Imaging Personally Reviewed:  [x] YES  [ ] NO    MEDICATIONS  (STANDING):  cephalexin 750 milliGRAM(s) Oral three times a day  diVALproex  milliGRAM(s) Oral every 12 hours  enoxaparin Injectable 40 milliGRAM(s) SubCutaneous every 24 hours  gabapentin 300 milliGRAM(s) Oral every 8 hours  lactulose Syrup 20 Gram(s) Oral two times a day  methadone    Tablet 165 milliGRAM(s) Oral daily  nicotine - 21 mG/24Hr(s) Patch 1 patch Transdermal daily  nystatin Cream 1 Application(s) Topical two times a day  pantoprazole    Tablet 40 milliGRAM(s) Oral before breakfast  PHENobarbital 32.4 milliGRAM(s) Oral every 6 hours  sodium chloride 0.9% lock flush 3 milliLiter(s) IV Push every 8 hours    MEDICATIONS  (PRN):  acetaminophen   Tablet 650 milliGRAM(s) Oral every 6 hours PRN For Temp greater than 38 C (100.4 F)  ibuprofen  Tablet 600 milliGRAM(s) Oral every 6 hours PRN fever          HEALTH ISSUES - PROBLEM Dx:  Fungal dermatitis  Suicidal ideation  Polysubstance abuse  Depression, unspecified depression type  Cellulitis JACK FITZGERALD  32y  Male    Patient is a 32y old  Male who presents with a chief complaint of Fever (25 Apr 2018 16:40)      INTERVAL HPI/OVERNIGHT EVENTS:  No new complaints. Patient feels much better.   Afebrile for > 24 hours.     REVIEW OF SYSTEMS:  CONSTITUTIONAL: No fever, weight loss, or fatigue  EYES: No eye pain, visual disturbances, or discharge  ENMT:  No difficulty hearing, tinnitus, vertigo; No sinus or throat pain  NECK: No pain or stiffness  RESPIRATORY: No cough, wheezing, chills or hemoptysis; No shortness of breath  CARDIOVASCULAR: No chest pain, palpitations, dizziness, or leg swelling  GASTROINTESTINAL: No abdominal or epigastric pain. No nausea, vomiting, or hematemesis; No diarrhea or constipation. No melena or hematochezia.  GENITOURINARY: No dysuria, frequency, hematuria, or incontinence  NEUROLOGICAL: No headaches, memory loss, loss of strength, numbness, or tremors  SKIN: Bilateral feet ulceration and erythema on the right lower anterior abdominal wall now resolving.  LYMPH NODES: No enlarged glands  ENDOCRINE: No heat or cold intolerance; No hair loss  MUSCULOSKELETAL: Bilateral leg pain and swelling with erythema now resolving.  PSYCHIATRIC: No depression, anxiety, mood swings, or difficulty sleeping  HEME/LYMPH: No easy bruising, or bleeding gums      VITALS:  T(C): 36.1 (28 Apr 2018 06:20), Max: 36.8 (27 Apr 2018 14:12)  T(F): 96.9 (28 Apr 2018 06:20), Max: 98.2 (27 Apr 2018 14:12)  HR: 53 (28 Apr 2018 06:20) (53 - 79)  BP: 97/54 (28 Apr 2018 06:20) (97/54 - 107/62)  BP(mean): --  RR: 16 (28 Apr 2018 06:20) (16 - 16)  SpO2: --    PHYSICAL EXAM:  GENERAL: NAD, well-developed  HEAD:  Atraumatic, Normocephalic  EYES: EOMI, PERRLA, conjunctiva and sclera clear  ENMT: Moist mucous membranes  NECK: Supple, Normal thyroid  NERVOUS SYSTEM:  Alert & Oriented X3, Good concentration; Motor Strength 5/5 B/L upper and lower extremities  CHEST/LUNG: Clear to auscultation bilaterally; No rales, rhonchi, wheezing, or rubs  HEART: Regular rate and rhythm; No murmurs, rubs, or gallops  ABDOMEN: Soft, Nontender, Nondistended; Bowel sounds present  EXTREMITIES:  2+ Peripheral Pulses, No clubbing, cyanosis, or edema  LYMPH: No cervical lymphadenopathy noted  SKIN: Bilateral feet erythema and pustular lesions resolving: now dry and scaly, no tenderness.    Consultant(s) Notes Reviewed:  [x ] YES  [ ] NO  Care Discussed with Consultants/Other Providers [ x] YES  [ ] NO    LABS: No new labs.                  Urinalysis (04.25.18 @ 11:20)    Glucose Qualitative, Urine: Negative mg/dL    Blood, Urine: Negative    pH Urine: 6.5    Color: Yellow    Urine Appearance: Clear    Bilirubin: Small    Ketone - Urine: Trace    Specific Gravity: 1.025    Protein, Urine: 30 mg/dL    Urobilinogen: 1.0 mg/dL    Nitrite: Negative    Leukocyte Esterase Concentration: Negative      MICROBIOLOGY:   Culture - Blood (04.25.18 @ 19:16)    Specimen Source: .Blood None    Culture Results: No growth to date.      RADIOLOGY & ADDITIONAL TESTS:  X-ray Chest 2 Views PA/Lat (04.25.18 @ 11:35)     No radiographic evidence of acute cardiopulmonary disease.      VA Duplex Lower Ext Vein Scan, Bilat (04.25.18 @ 12:06)   No evidence of deep venous thrombosis or superficial thrombophlebitis in   the bilateral lower extremities.    Imaging Personally Reviewed:  [x] YES  [ ] NO    MEDICATIONS  (STANDING):  cephalexin 750 milliGRAM(s) Oral three times a day  diVALproex  milliGRAM(s) Oral every 12 hours  enoxaparin Injectable 40 milliGRAM(s) SubCutaneous every 24 hours  gabapentin 300 milliGRAM(s) Oral every 8 hours  lactulose Syrup 20 Gram(s) Oral two times a day  methadone    Tablet 165 milliGRAM(s) Oral daily  nicotine - 21 mG/24Hr(s) Patch 1 patch Transdermal daily  nystatin Cream 1 Application(s) Topical two times a day  pantoprazole    Tablet 40 milliGRAM(s) Oral before breakfast  PHENobarbital 32.4 milliGRAM(s) Oral every 6 hours  sodium chloride 0.9% lock flush 3 milliLiter(s) IV Push every 8 hours    MEDICATIONS  (PRN):  acetaminophen   Tablet 650 milliGRAM(s) Oral every 6 hours PRN For Temp greater than 38 C (100.4 F)  ibuprofen  Tablet 600 milliGRAM(s) Oral every 6 hours PRN fever          HEALTH ISSUES - PROBLEM Dx:  Fungal dermatitis  Suicidal ideation  Polysubstance abuse  Depression, unspecified depression type  Cellulitis

## 2018-04-29 RX ORDER — MIRTAZAPINE 45 MG/1
1 TABLET, ORALLY DISINTEGRATING ORAL
Qty: 0 | Refills: 0 | COMMUNITY
Start: 2018-04-29

## 2018-04-29 RX ADMIN — METHADONE HYDROCHLORIDE 160 MILLIGRAM(S): 40 TABLET ORAL at 06:09

## 2018-04-29 RX ADMIN — DIVALPROEX SODIUM 1000 MILLIGRAM(S): 500 TABLET, DELAYED RELEASE ORAL at 21:03

## 2018-04-29 RX ADMIN — GABAPENTIN 600 MILLIGRAM(S): 400 CAPSULE ORAL at 13:17

## 2018-04-29 RX ADMIN — NYSTATIN CREAM 1 APPLICATION(S): 100000 CREAM TOPICAL at 09:59

## 2018-04-29 RX ADMIN — Medication 32.4 MILLIGRAM(S): at 09:59

## 2018-04-29 RX ADMIN — Medication 750 MILLIGRAM(S): at 05:52

## 2018-04-29 RX ADMIN — METHADONE HYDROCHLORIDE 5 MILLIGRAM(S): 40 TABLET ORAL at 06:09

## 2018-04-29 RX ADMIN — GABAPENTIN 600 MILLIGRAM(S): 400 CAPSULE ORAL at 21:03

## 2018-04-29 RX ADMIN — GABAPENTIN 600 MILLIGRAM(S): 400 CAPSULE ORAL at 09:58

## 2018-04-29 RX ADMIN — NYSTATIN CREAM 1 APPLICATION(S): 100000 CREAM TOPICAL at 21:54

## 2018-04-29 RX ADMIN — Medication 750 MILLIGRAM(S): at 21:03

## 2018-04-29 RX ADMIN — Medication 750 MILLIGRAM(S): at 13:17

## 2018-04-29 RX ADMIN — DIVALPROEX SODIUM 750 MILLIGRAM(S): 500 TABLET, DELAYED RELEASE ORAL at 09:58

## 2018-04-29 RX ADMIN — MIRTAZAPINE 30 MILLIGRAM(S): 45 TABLET, ORALLY DISINTEGRATING ORAL at 21:03

## 2018-04-29 RX ADMIN — Medication 32.4 MILLIGRAM(S): at 21:05

## 2018-04-29 NOTE — CHART NOTE - NSCHARTNOTEFT_GEN_A_CORE
Allergies:  Haldol (Dystonic RXN)  Talwin (Unknown)      Diet: Regular    Activity: as tolerated    Follow up with    1. PMD in 2 weeks    2. Psych in 1 weeks        Extra Instructions:      Flu Vaccine given  Yes_____         No______      Diagnosis:  Chemical Dependency   Maintain sobriety  refrain from all use      Patient Signature___________________________________________  Date_________________      Nurse Signature_____________________________________________Date_________________

## 2018-04-30 ENCOUNTER — INPATIENT (INPATIENT)
Facility: HOSPITAL | Age: 33
LOS: 27 days | Discharge: NURSING, REHAB OR LONG-TERM CARE | End: 2018-05-28
Attending: INTERNAL MEDICINE | Admitting: INTERNAL MEDICINE

## 2018-04-30 VITALS
DIASTOLIC BLOOD PRESSURE: 53 MMHG | TEMPERATURE: 96 F | HEART RATE: 62 BPM | SYSTOLIC BLOOD PRESSURE: 124 MMHG | RESPIRATION RATE: 16 BRPM

## 2018-04-30 VITALS
DIASTOLIC BLOOD PRESSURE: 63 MMHG | SYSTOLIC BLOOD PRESSURE: 141 MMHG | HEIGHT: 74 IN | HEART RATE: 72 BPM | TEMPERATURE: 99 F | WEIGHT: 244.93 LBS

## 2018-04-30 DIAGNOSIS — F10.20 ALCOHOL DEPENDENCE, UNCOMPLICATED: ICD-10-CM

## 2018-04-30 LAB
CULTURE RESULTS: SIGNIFICANT CHANGE UP
CULTURE RESULTS: SIGNIFICANT CHANGE UP
SPECIMEN SOURCE: SIGNIFICANT CHANGE UP
SPECIMEN SOURCE: SIGNIFICANT CHANGE UP

## 2018-04-30 RX ORDER — GABAPENTIN 400 MG/1
600 CAPSULE ORAL EVERY 8 HOURS
Qty: 0 | Refills: 0 | Status: DISCONTINUED | OUTPATIENT
Start: 2018-04-30 | End: 2018-05-28

## 2018-04-30 RX ORDER — MIRTAZAPINE 45 MG/1
30 TABLET, ORALLY DISINTEGRATING ORAL ONCE
Qty: 0 | Refills: 0 | Status: COMPLETED | OUTPATIENT
Start: 2018-04-30 | End: 2018-04-30

## 2018-04-30 RX ORDER — IBUPROFEN 200 MG
400 TABLET ORAL EVERY 8 HOURS
Qty: 0 | Refills: 0 | Status: DISCONTINUED | OUTPATIENT
Start: 2018-04-30 | End: 2018-05-01

## 2018-04-30 RX ORDER — ACETAMINOPHEN 500 MG
650 TABLET ORAL EVERY 6 HOURS
Qty: 0 | Refills: 0 | Status: DISCONTINUED | OUTPATIENT
Start: 2018-04-30 | End: 2018-05-28

## 2018-04-30 RX ORDER — HYDROXYZINE HCL 10 MG
100 TABLET ORAL AT BEDTIME
Qty: 0 | Refills: 0 | Status: DISCONTINUED | OUTPATIENT
Start: 2018-04-30 | End: 2018-05-28

## 2018-04-30 RX ORDER — DIVALPROEX SODIUM 500 MG/1
1000 TABLET, DELAYED RELEASE ORAL AT BEDTIME
Qty: 0 | Refills: 0 | Status: DISCONTINUED | OUTPATIENT
Start: 2018-04-30 | End: 2018-05-28

## 2018-04-30 RX ORDER — CEPHALEXIN 500 MG
500 CAPSULE ORAL EVERY 8 HOURS
Qty: 0 | Refills: 0 | Status: COMPLETED | OUTPATIENT
Start: 2018-04-30 | End: 2018-05-05

## 2018-04-30 RX ORDER — MIRTAZAPINE 45 MG/1
30 TABLET, ORALLY DISINTEGRATING ORAL AT BEDTIME
Qty: 0 | Refills: 0 | Status: DISCONTINUED | OUTPATIENT
Start: 2018-04-30 | End: 2018-05-28

## 2018-04-30 RX ORDER — METHADONE HYDROCHLORIDE 40 MG/1
165 TABLET ORAL DAILY
Qty: 0 | Refills: 0 | Status: DISCONTINUED | OUTPATIENT
Start: 2018-05-01 | End: 2018-05-08

## 2018-04-30 RX ORDER — HYDROXYZINE HCL 10 MG
50 TABLET ORAL EVERY 6 HOURS
Qty: 0 | Refills: 0 | Status: DISCONTINUED | OUTPATIENT
Start: 2018-04-30 | End: 2018-05-28

## 2018-04-30 RX ADMIN — DIVALPROEX SODIUM 1000 MILLIGRAM(S): 500 TABLET, DELAYED RELEASE ORAL at 21:00

## 2018-04-30 RX ADMIN — Medication 500 MILLIGRAM(S): at 21:00

## 2018-04-30 RX ADMIN — METHADONE HYDROCHLORIDE 160 MILLIGRAM(S): 40 TABLET ORAL at 05:59

## 2018-04-30 RX ADMIN — GABAPENTIN 600 MILLIGRAM(S): 400 CAPSULE ORAL at 08:50

## 2018-04-30 RX ADMIN — Medication 32.4 MILLIGRAM(S): at 08:51

## 2018-04-30 RX ADMIN — NYSTATIN CREAM 1 APPLICATION(S): 100000 CREAM TOPICAL at 08:52

## 2018-04-30 RX ADMIN — GABAPENTIN 600 MILLIGRAM(S): 400 CAPSULE ORAL at 21:00

## 2018-04-30 RX ADMIN — Medication 1 APPLICATION(S): at 21:00

## 2018-04-30 RX ADMIN — LACTULOSE 20 GRAM(S): 10 SOLUTION ORAL at 08:51

## 2018-04-30 RX ADMIN — METHADONE HYDROCHLORIDE 5 MILLIGRAM(S): 40 TABLET ORAL at 05:58

## 2018-04-30 RX ADMIN — Medication 750 MILLIGRAM(S): at 05:57

## 2018-04-30 RX ADMIN — MIRTAZAPINE 30 MILLIGRAM(S): 45 TABLET, ORALLY DISINTEGRATING ORAL at 23:32

## 2018-04-30 RX ADMIN — DIVALPROEX SODIUM 750 MILLIGRAM(S): 500 TABLET, DELAYED RELEASE ORAL at 08:51

## 2018-04-30 NOTE — CHART NOTE - NSCHARTNOTEFT_GEN_A_CORE
Subsequent Inpatient Encounter                                       Detox Unit    JACK FITZGERALD   32y   Male      Chief Complaint:    Follow up for Benzodiazipine  Dependency    HPI:     I reviewed previous notes.No Change, except if noted below.             Detail:_    ROS:   I reviewed with patient.  No changes from previous notes except if noted below.             Detail: _    PFSH I reviewed with patient. No changes from previous notes except if noted below.             Detail_    Medication reconciliation performed.    MEDICATIONS  (STANDING):  cephalexin 750 milliGRAM(s) Oral every 8 hours  diVALproex DR 1000 milliGRAM(s) Oral at bedtime  diVALproex  milliGRAM(s) Oral daily  gabapentin 600 milliGRAM(s) Oral three times a day  lactulose Syrup 20 Gram(s) Oral two times a day  methadone    Tablet 5 milliGRAM(s) Oral daily  methadone   Dispersible 160 milliGRAM(s) Oral daily  mirtazapine 30 milliGRAM(s) Oral at bedtime  nicotine - 21 mG/24Hr(s) Patch 1 patch Transdermal daily  nystatin Cream 1 Application(s) Topical two times a day  PHENobarbital 32.4 milliGRAM(s) Oral every 12 hours      MEDICATIONS  (PRN):  acetaminophen   Tablet. 650 milliGRAM(s) Oral every 8 hours PRN For Temp greater than 38 C (100.4 F)  PHENobarbital 32.4 milliGRAM(s) Oral every 4 hours PRN Withdrawal      T(C): 35.6 (18 @ 06:29), Max: 36.3 (18 @ 18:00)  HR: 62 (18 @ 06:29) (52 - 73)  BP: 124/53 (18 @ 06:29) (98/53 - 152/87)  RR: 16 (18 @ 06:29) (16 - 16)  SpO2: --    PHYSICAL EXAM:      Constitutional: NAD, A&O x3    Eyes: PERRLA, no conjuctivitis    Neck: no lymphadenopathy    Respiratory: +air entry, no rales, no rhonchi, no wheezes    Cardiovascular: +S1 and S2, regular rate and rhythm    Gastrointestinal: +BS, soft, non-tender, not distended    Extremities:  no edema, no calf tenderness    Skin: no rashes, normal turgor                  Urinalysis Basic - ( 2018 14:53 )    Color: Yellow / Appearance: Clear / S.020 / pH: x  Gluc: x / Ketone: Negative  / Bili: Negative / Urobili: 1.0 mg/dL   Blood: x / Protein: Negative mg/dL / Nitrite: Negative   Leuk Esterase: Negative / RBC: x / WBC x   Sq Epi: x / Non Sq Epi: x / Bacteria: x    Drug Screen 1, Urine Result: Done (18 @ 14:53)        Impression and Plan:    Primary Diagnosis:  Opiate/Benzodiazipine Dependency                                Medication: Methadone/Phenobarbitol Protocol    Secondary Diagnosis:           Bipolar D/o                                                Medication: stable    Tertiary Diagnosis:                                                                                     Medication:      Continue Detox Protocols. Use of PRNS as needed for withdrawal and comfort.    Adjustments to protocols:    Labs/ Tests reviewed.    Tests ordered:     Likely Disposition: _X__Home       ___Rehab       ___Outpatient Program    ___Self Help     _____Other    Estimated Length of stay:_5___

## 2018-05-01 RX ORDER — IBUPROFEN 200 MG
400 TABLET ORAL EVERY 6 HOURS
Qty: 0 | Refills: 0 | Status: DISCONTINUED | OUTPATIENT
Start: 2018-05-01 | End: 2018-05-28

## 2018-05-01 RX ADMIN — Medication 1 APPLICATION(S): at 22:45

## 2018-05-01 RX ADMIN — DIVALPROEX SODIUM 1000 MILLIGRAM(S): 500 TABLET, DELAYED RELEASE ORAL at 21:57

## 2018-05-01 RX ADMIN — METHADONE HYDROCHLORIDE 165 MILLIGRAM(S): 40 TABLET ORAL at 05:27

## 2018-05-01 RX ADMIN — MIRTAZAPINE 30 MILLIGRAM(S): 45 TABLET, ORALLY DISINTEGRATING ORAL at 21:58

## 2018-05-01 RX ADMIN — Medication 1 APPLICATION(S): at 08:20

## 2018-05-01 RX ADMIN — GABAPENTIN 600 MILLIGRAM(S): 400 CAPSULE ORAL at 13:56

## 2018-05-01 RX ADMIN — Medication 500 MILLIGRAM(S): at 21:57

## 2018-05-01 RX ADMIN — Medication 500 MILLIGRAM(S): at 13:56

## 2018-05-01 RX ADMIN — GABAPENTIN 600 MILLIGRAM(S): 400 CAPSULE ORAL at 05:27

## 2018-05-01 RX ADMIN — GABAPENTIN 600 MILLIGRAM(S): 400 CAPSULE ORAL at 21:57

## 2018-05-01 RX ADMIN — Medication 500 MILLIGRAM(S): at 05:27

## 2018-05-02 RX ADMIN — GABAPENTIN 600 MILLIGRAM(S): 400 CAPSULE ORAL at 14:11

## 2018-05-02 RX ADMIN — Medication 1 APPLICATION(S): at 21:27

## 2018-05-02 RX ADMIN — DIVALPROEX SODIUM 1000 MILLIGRAM(S): 500 TABLET, DELAYED RELEASE ORAL at 21:28

## 2018-05-02 RX ADMIN — METHADONE HYDROCHLORIDE 165 MILLIGRAM(S): 40 TABLET ORAL at 05:33

## 2018-05-02 RX ADMIN — Medication 500 MILLIGRAM(S): at 21:28

## 2018-05-02 RX ADMIN — MIRTAZAPINE 30 MILLIGRAM(S): 45 TABLET, ORALLY DISINTEGRATING ORAL at 21:28

## 2018-05-02 RX ADMIN — Medication 500 MILLIGRAM(S): at 14:11

## 2018-05-02 RX ADMIN — Medication 500 MILLIGRAM(S): at 05:33

## 2018-05-02 RX ADMIN — Medication 1 APPLICATION(S): at 08:35

## 2018-05-02 RX ADMIN — GABAPENTIN 600 MILLIGRAM(S): 400 CAPSULE ORAL at 21:28

## 2018-05-02 RX ADMIN — GABAPENTIN 600 MILLIGRAM(S): 400 CAPSULE ORAL at 05:33

## 2018-05-03 DIAGNOSIS — F11.20 OPIOID DEPENDENCE, UNCOMPLICATED: ICD-10-CM

## 2018-05-03 DIAGNOSIS — L02.612 CUTANEOUS ABSCESS OF LEFT FOOT: ICD-10-CM

## 2018-05-03 DIAGNOSIS — F31.9 BIPOLAR DISORDER, UNSPECIFIED: ICD-10-CM

## 2018-05-03 DIAGNOSIS — L02.611 CUTANEOUS ABSCESS OF RIGHT FOOT: ICD-10-CM

## 2018-05-03 DIAGNOSIS — F43.10 POST-TRAUMATIC STRESS DISORDER, UNSPECIFIED: ICD-10-CM

## 2018-05-03 DIAGNOSIS — R45.851 SUICIDAL IDEATIONS: ICD-10-CM

## 2018-05-03 DIAGNOSIS — F17.210 NICOTINE DEPENDENCE, CIGARETTES, UNCOMPLICATED: ICD-10-CM

## 2018-05-03 DIAGNOSIS — F13.29 SEDATIVE, HYPNOTIC OR ANXIOLYTIC DEPENDENCE WITH UNSPECIFIED SEDATIVE, HYPNOTIC OR ANXIOLYTIC-INDUCED DISORDER: ICD-10-CM

## 2018-05-03 DIAGNOSIS — Z59.0 HOMELESSNESS: ICD-10-CM

## 2018-05-03 DIAGNOSIS — F13.20 SEDATIVE, HYPNOTIC OR ANXIOLYTIC DEPENDENCE, UNCOMPLICATED: ICD-10-CM

## 2018-05-03 DIAGNOSIS — F12.10 CANNABIS ABUSE, UNCOMPLICATED: ICD-10-CM

## 2018-05-03 DIAGNOSIS — L03.116 CELLULITIS OF LEFT LOWER LIMB: ICD-10-CM

## 2018-05-03 DIAGNOSIS — L03.115 CELLULITIS OF RIGHT LOWER LIMB: ICD-10-CM

## 2018-05-03 RX ADMIN — Medication 500 MILLIGRAM(S): at 12:40

## 2018-05-03 RX ADMIN — Medication 500 MILLIGRAM(S): at 23:06

## 2018-05-03 RX ADMIN — DIVALPROEX SODIUM 1000 MILLIGRAM(S): 500 TABLET, DELAYED RELEASE ORAL at 23:04

## 2018-05-03 RX ADMIN — METHADONE HYDROCHLORIDE 165 MILLIGRAM(S): 40 TABLET ORAL at 05:28

## 2018-05-03 RX ADMIN — Medication 500 MILLIGRAM(S): at 05:28

## 2018-05-03 RX ADMIN — GABAPENTIN 600 MILLIGRAM(S): 400 CAPSULE ORAL at 23:06

## 2018-05-03 RX ADMIN — GABAPENTIN 600 MILLIGRAM(S): 400 CAPSULE ORAL at 05:28

## 2018-05-03 RX ADMIN — Medication 1 APPLICATION(S): at 08:06

## 2018-05-03 RX ADMIN — MIRTAZAPINE 30 MILLIGRAM(S): 45 TABLET, ORALLY DISINTEGRATING ORAL at 23:04

## 2018-05-03 RX ADMIN — GABAPENTIN 600 MILLIGRAM(S): 400 CAPSULE ORAL at 12:40

## 2018-05-03 SDOH — ECONOMIC STABILITY - HOUSING INSECURITY: HOMELESSNESS: Z59.0

## 2018-05-04 RX ADMIN — DIVALPROEX SODIUM 1000 MILLIGRAM(S): 500 TABLET, DELAYED RELEASE ORAL at 21:00

## 2018-05-04 RX ADMIN — Medication 500 MILLIGRAM(S): at 12:34

## 2018-05-04 RX ADMIN — GABAPENTIN 600 MILLIGRAM(S): 400 CAPSULE ORAL at 21:53

## 2018-05-04 RX ADMIN — Medication 1 APPLICATION(S): at 21:00

## 2018-05-04 RX ADMIN — Medication 500 MILLIGRAM(S): at 21:02

## 2018-05-04 RX ADMIN — METHADONE HYDROCHLORIDE 165 MILLIGRAM(S): 40 TABLET ORAL at 05:35

## 2018-05-04 RX ADMIN — GABAPENTIN 600 MILLIGRAM(S): 400 CAPSULE ORAL at 12:37

## 2018-05-04 RX ADMIN — GABAPENTIN 600 MILLIGRAM(S): 400 CAPSULE ORAL at 05:35

## 2018-05-04 RX ADMIN — MIRTAZAPINE 30 MILLIGRAM(S): 45 TABLET, ORALLY DISINTEGRATING ORAL at 23:50

## 2018-05-04 RX ADMIN — Medication 500 MILLIGRAM(S): at 05:35

## 2018-05-05 RX ADMIN — METHADONE HYDROCHLORIDE 165 MILLIGRAM(S): 40 TABLET ORAL at 05:45

## 2018-05-05 RX ADMIN — GABAPENTIN 600 MILLIGRAM(S): 400 CAPSULE ORAL at 05:56

## 2018-05-05 RX ADMIN — Medication 500 MILLIGRAM(S): at 05:42

## 2018-05-05 RX ADMIN — Medication 500 MILLIGRAM(S): at 18:06

## 2018-05-05 RX ADMIN — GABAPENTIN 600 MILLIGRAM(S): 400 CAPSULE ORAL at 18:04

## 2018-05-06 RX ADMIN — DIVALPROEX SODIUM 1000 MILLIGRAM(S): 500 TABLET, DELAYED RELEASE ORAL at 00:13

## 2018-05-06 RX ADMIN — METHADONE HYDROCHLORIDE 165 MILLIGRAM(S): 40 TABLET ORAL at 05:29

## 2018-05-06 RX ADMIN — GABAPENTIN 600 MILLIGRAM(S): 400 CAPSULE ORAL at 15:39

## 2018-05-06 RX ADMIN — MIRTAZAPINE 30 MILLIGRAM(S): 45 TABLET, ORALLY DISINTEGRATING ORAL at 00:13

## 2018-05-06 RX ADMIN — DIVALPROEX SODIUM 1000 MILLIGRAM(S): 500 TABLET, DELAYED RELEASE ORAL at 22:49

## 2018-05-06 RX ADMIN — GABAPENTIN 600 MILLIGRAM(S): 400 CAPSULE ORAL at 00:13

## 2018-05-06 RX ADMIN — GABAPENTIN 600 MILLIGRAM(S): 400 CAPSULE ORAL at 22:50

## 2018-05-06 RX ADMIN — GABAPENTIN 600 MILLIGRAM(S): 400 CAPSULE ORAL at 05:29

## 2018-05-06 RX ADMIN — Medication 1 APPLICATION(S): at 08:56

## 2018-05-06 RX ADMIN — MIRTAZAPINE 30 MILLIGRAM(S): 45 TABLET, ORALLY DISINTEGRATING ORAL at 22:50

## 2018-05-07 RX ORDER — METHADONE HYDROCHLORIDE 40 MG/1
165 TABLET ORAL DAILY
Qty: 0 | Refills: 0 | Status: DISCONTINUED | OUTPATIENT
Start: 2018-05-09 | End: 2018-05-09

## 2018-05-07 RX ADMIN — GABAPENTIN 600 MILLIGRAM(S): 400 CAPSULE ORAL at 22:43

## 2018-05-07 RX ADMIN — METHADONE HYDROCHLORIDE 165 MILLIGRAM(S): 40 TABLET ORAL at 05:13

## 2018-05-07 RX ADMIN — GABAPENTIN 600 MILLIGRAM(S): 400 CAPSULE ORAL at 14:01

## 2018-05-07 RX ADMIN — Medication 1 APPLICATION(S): at 08:58

## 2018-05-07 RX ADMIN — GABAPENTIN 600 MILLIGRAM(S): 400 CAPSULE ORAL at 05:12

## 2018-05-07 RX ADMIN — Medication 1 APPLICATION(S): at 22:43

## 2018-05-07 RX ADMIN — DIVALPROEX SODIUM 1000 MILLIGRAM(S): 500 TABLET, DELAYED RELEASE ORAL at 22:43

## 2018-05-07 RX ADMIN — MIRTAZAPINE 30 MILLIGRAM(S): 45 TABLET, ORALLY DISINTEGRATING ORAL at 22:43

## 2018-05-08 RX ADMIN — DIVALPROEX SODIUM 1000 MILLIGRAM(S): 500 TABLET, DELAYED RELEASE ORAL at 22:47

## 2018-05-08 RX ADMIN — GABAPENTIN 600 MILLIGRAM(S): 400 CAPSULE ORAL at 05:35

## 2018-05-08 RX ADMIN — GABAPENTIN 600 MILLIGRAM(S): 400 CAPSULE ORAL at 22:46

## 2018-05-08 RX ADMIN — GABAPENTIN 600 MILLIGRAM(S): 400 CAPSULE ORAL at 14:50

## 2018-05-08 RX ADMIN — MIRTAZAPINE 30 MILLIGRAM(S): 45 TABLET, ORALLY DISINTEGRATING ORAL at 22:47

## 2018-05-08 RX ADMIN — Medication 1 APPLICATION(S): at 22:47

## 2018-05-08 RX ADMIN — METHADONE HYDROCHLORIDE 165 MILLIGRAM(S): 40 TABLET ORAL at 05:36

## 2018-05-08 RX ADMIN — Medication 1 APPLICATION(S): at 08:56

## 2018-05-09 RX ORDER — METHADONE HYDROCHLORIDE 40 MG/1
165 TABLET ORAL DAILY
Qty: 0 | Refills: 0 | Status: DISCONTINUED | OUTPATIENT
Start: 2018-05-09 | End: 2018-05-16

## 2018-05-09 RX ADMIN — Medication 1 APPLICATION(S): at 22:15

## 2018-05-09 RX ADMIN — MIRTAZAPINE 30 MILLIGRAM(S): 45 TABLET, ORALLY DISINTEGRATING ORAL at 22:16

## 2018-05-09 RX ADMIN — GABAPENTIN 600 MILLIGRAM(S): 400 CAPSULE ORAL at 05:19

## 2018-05-09 RX ADMIN — DIVALPROEX SODIUM 1000 MILLIGRAM(S): 500 TABLET, DELAYED RELEASE ORAL at 22:16

## 2018-05-09 RX ADMIN — METHADONE HYDROCHLORIDE 165 MILLIGRAM(S): 40 TABLET ORAL at 05:26

## 2018-05-09 RX ADMIN — GABAPENTIN 600 MILLIGRAM(S): 400 CAPSULE ORAL at 13:03

## 2018-05-09 RX ADMIN — GABAPENTIN 600 MILLIGRAM(S): 400 CAPSULE ORAL at 22:16

## 2018-05-10 RX ORDER — HYDROCORTISONE 1 %
1 OINTMENT (GRAM) TOPICAL
Qty: 0 | Refills: 0 | Status: DISCONTINUED | OUTPATIENT
Start: 2018-05-10 | End: 2018-05-28

## 2018-05-10 RX ADMIN — Medication 1 APPLICATION(S): at 22:42

## 2018-05-10 RX ADMIN — GABAPENTIN 600 MILLIGRAM(S): 400 CAPSULE ORAL at 14:43

## 2018-05-10 RX ADMIN — GABAPENTIN 600 MILLIGRAM(S): 400 CAPSULE ORAL at 05:03

## 2018-05-10 RX ADMIN — DIVALPROEX SODIUM 1000 MILLIGRAM(S): 500 TABLET, DELAYED RELEASE ORAL at 22:41

## 2018-05-10 RX ADMIN — MIRTAZAPINE 30 MILLIGRAM(S): 45 TABLET, ORALLY DISINTEGRATING ORAL at 22:41

## 2018-05-10 RX ADMIN — METHADONE HYDROCHLORIDE 165 MILLIGRAM(S): 40 TABLET ORAL at 05:03

## 2018-05-10 RX ADMIN — GABAPENTIN 600 MILLIGRAM(S): 400 CAPSULE ORAL at 22:41

## 2018-05-11 RX ADMIN — GABAPENTIN 600 MILLIGRAM(S): 400 CAPSULE ORAL at 05:37

## 2018-05-11 RX ADMIN — METHADONE HYDROCHLORIDE 165 MILLIGRAM(S): 40 TABLET ORAL at 05:38

## 2018-05-11 RX ADMIN — GABAPENTIN 600 MILLIGRAM(S): 400 CAPSULE ORAL at 23:59

## 2018-05-11 RX ADMIN — MIRTAZAPINE 30 MILLIGRAM(S): 45 TABLET, ORALLY DISINTEGRATING ORAL at 23:59

## 2018-05-11 RX ADMIN — GABAPENTIN 600 MILLIGRAM(S): 400 CAPSULE ORAL at 14:40

## 2018-05-12 RX ADMIN — Medication 1 APPLICATION(S): at 08:47

## 2018-05-12 RX ADMIN — GABAPENTIN 600 MILLIGRAM(S): 400 CAPSULE ORAL at 13:05

## 2018-05-12 RX ADMIN — METHADONE HYDROCHLORIDE 165 MILLIGRAM(S): 40 TABLET ORAL at 05:18

## 2018-05-12 RX ADMIN — GABAPENTIN 600 MILLIGRAM(S): 400 CAPSULE ORAL at 05:18

## 2018-05-13 RX ADMIN — DIVALPROEX SODIUM 1000 MILLIGRAM(S): 500 TABLET, DELAYED RELEASE ORAL at 22:42

## 2018-05-13 RX ADMIN — GABAPENTIN 600 MILLIGRAM(S): 400 CAPSULE ORAL at 22:43

## 2018-05-13 RX ADMIN — MIRTAZAPINE 30 MILLIGRAM(S): 45 TABLET, ORALLY DISINTEGRATING ORAL at 00:08

## 2018-05-13 RX ADMIN — GABAPENTIN 600 MILLIGRAM(S): 400 CAPSULE ORAL at 00:08

## 2018-05-13 RX ADMIN — GABAPENTIN 600 MILLIGRAM(S): 400 CAPSULE ORAL at 05:25

## 2018-05-13 RX ADMIN — METHADONE HYDROCHLORIDE 165 MILLIGRAM(S): 40 TABLET ORAL at 05:26

## 2018-05-13 RX ADMIN — MIRTAZAPINE 30 MILLIGRAM(S): 45 TABLET, ORALLY DISINTEGRATING ORAL at 22:42

## 2018-05-14 RX ADMIN — GABAPENTIN 600 MILLIGRAM(S): 400 CAPSULE ORAL at 13:43

## 2018-05-14 RX ADMIN — DIVALPROEX SODIUM 1000 MILLIGRAM(S): 500 TABLET, DELAYED RELEASE ORAL at 22:32

## 2018-05-14 RX ADMIN — MIRTAZAPINE 30 MILLIGRAM(S): 45 TABLET, ORALLY DISINTEGRATING ORAL at 22:33

## 2018-05-14 RX ADMIN — GABAPENTIN 600 MILLIGRAM(S): 400 CAPSULE ORAL at 22:33

## 2018-05-14 RX ADMIN — GABAPENTIN 600 MILLIGRAM(S): 400 CAPSULE ORAL at 05:26

## 2018-05-14 RX ADMIN — METHADONE HYDROCHLORIDE 165 MILLIGRAM(S): 40 TABLET ORAL at 05:24

## 2018-05-15 RX ADMIN — MIRTAZAPINE 30 MILLIGRAM(S): 45 TABLET, ORALLY DISINTEGRATING ORAL at 22:36

## 2018-05-15 RX ADMIN — GABAPENTIN 600 MILLIGRAM(S): 400 CAPSULE ORAL at 22:37

## 2018-05-15 RX ADMIN — GABAPENTIN 600 MILLIGRAM(S): 400 CAPSULE ORAL at 13:59

## 2018-05-15 RX ADMIN — METHADONE HYDROCHLORIDE 165 MILLIGRAM(S): 40 TABLET ORAL at 05:38

## 2018-05-15 RX ADMIN — GABAPENTIN 600 MILLIGRAM(S): 400 CAPSULE ORAL at 05:35

## 2018-05-15 RX ADMIN — DIVALPROEX SODIUM 1000 MILLIGRAM(S): 500 TABLET, DELAYED RELEASE ORAL at 22:36

## 2018-05-16 RX ORDER — METHADONE HYDROCHLORIDE 40 MG/1
165 TABLET ORAL DAILY
Qty: 0 | Refills: 0 | Status: DISCONTINUED | OUTPATIENT
Start: 2018-05-16 | End: 2018-05-16

## 2018-05-16 RX ORDER — METHADONE HYDROCHLORIDE 40 MG/1
165 TABLET ORAL EVERY 24 HOURS
Qty: 0 | Refills: 0 | Status: DISCONTINUED | OUTPATIENT
Start: 2018-05-16 | End: 2018-05-23

## 2018-05-16 RX ADMIN — DIVALPROEX SODIUM 1000 MILLIGRAM(S): 500 TABLET, DELAYED RELEASE ORAL at 22:56

## 2018-05-16 RX ADMIN — GABAPENTIN 600 MILLIGRAM(S): 400 CAPSULE ORAL at 22:57

## 2018-05-16 RX ADMIN — GABAPENTIN 600 MILLIGRAM(S): 400 CAPSULE ORAL at 05:02

## 2018-05-16 RX ADMIN — METHADONE HYDROCHLORIDE 165 MILLIGRAM(S): 40 TABLET ORAL at 05:03

## 2018-05-16 RX ADMIN — GABAPENTIN 600 MILLIGRAM(S): 400 CAPSULE ORAL at 14:13

## 2018-05-16 RX ADMIN — MIRTAZAPINE 30 MILLIGRAM(S): 45 TABLET, ORALLY DISINTEGRATING ORAL at 22:56

## 2018-05-16 RX ADMIN — Medication 1 APPLICATION(S): at 22:57

## 2018-05-17 RX ADMIN — METHADONE HYDROCHLORIDE 165 MILLIGRAM(S): 40 TABLET ORAL at 05:06

## 2018-05-17 RX ADMIN — GABAPENTIN 600 MILLIGRAM(S): 400 CAPSULE ORAL at 22:30

## 2018-05-17 RX ADMIN — DIVALPROEX SODIUM 1000 MILLIGRAM(S): 500 TABLET, DELAYED RELEASE ORAL at 22:30

## 2018-05-17 RX ADMIN — GABAPENTIN 600 MILLIGRAM(S): 400 CAPSULE ORAL at 14:24

## 2018-05-17 RX ADMIN — GABAPENTIN 600 MILLIGRAM(S): 400 CAPSULE ORAL at 05:07

## 2018-05-17 RX ADMIN — Medication 1 APPLICATION(S): at 22:30

## 2018-05-17 RX ADMIN — MIRTAZAPINE 30 MILLIGRAM(S): 45 TABLET, ORALLY DISINTEGRATING ORAL at 22:30

## 2018-05-17 RX ADMIN — Medication 30 MILLILITER(S): at 17:25

## 2018-05-18 RX ADMIN — MIRTAZAPINE 30 MILLIGRAM(S): 45 TABLET, ORALLY DISINTEGRATING ORAL at 23:04

## 2018-05-18 RX ADMIN — METHADONE HYDROCHLORIDE 165 MILLIGRAM(S): 40 TABLET ORAL at 05:18

## 2018-05-18 RX ADMIN — GABAPENTIN 600 MILLIGRAM(S): 400 CAPSULE ORAL at 05:17

## 2018-05-18 RX ADMIN — DIVALPROEX SODIUM 1000 MILLIGRAM(S): 500 TABLET, DELAYED RELEASE ORAL at 23:04

## 2018-05-18 RX ADMIN — GABAPENTIN 600 MILLIGRAM(S): 400 CAPSULE ORAL at 13:03

## 2018-05-18 RX ADMIN — GABAPENTIN 600 MILLIGRAM(S): 400 CAPSULE ORAL at 23:08

## 2018-05-19 RX ADMIN — GABAPENTIN 600 MILLIGRAM(S): 400 CAPSULE ORAL at 23:27

## 2018-05-19 RX ADMIN — MIRTAZAPINE 30 MILLIGRAM(S): 45 TABLET, ORALLY DISINTEGRATING ORAL at 23:27

## 2018-05-19 RX ADMIN — DIVALPROEX SODIUM 1000 MILLIGRAM(S): 500 TABLET, DELAYED RELEASE ORAL at 23:27

## 2018-05-19 RX ADMIN — METHADONE HYDROCHLORIDE 165 MILLIGRAM(S): 40 TABLET ORAL at 05:11

## 2018-05-19 RX ADMIN — GABAPENTIN 600 MILLIGRAM(S): 400 CAPSULE ORAL at 15:13

## 2018-05-19 RX ADMIN — GABAPENTIN 600 MILLIGRAM(S): 400 CAPSULE ORAL at 05:12

## 2018-05-20 RX ADMIN — Medication 1 APPLICATION(S): at 12:46

## 2018-05-20 RX ADMIN — DIVALPROEX SODIUM 1000 MILLIGRAM(S): 500 TABLET, DELAYED RELEASE ORAL at 23:07

## 2018-05-20 RX ADMIN — METHADONE HYDROCHLORIDE 165 MILLIGRAM(S): 40 TABLET ORAL at 05:03

## 2018-05-20 RX ADMIN — MIRTAZAPINE 30 MILLIGRAM(S): 45 TABLET, ORALLY DISINTEGRATING ORAL at 23:06

## 2018-05-20 RX ADMIN — GABAPENTIN 600 MILLIGRAM(S): 400 CAPSULE ORAL at 05:03

## 2018-05-20 RX ADMIN — GABAPENTIN 600 MILLIGRAM(S): 400 CAPSULE ORAL at 12:45

## 2018-05-20 RX ADMIN — GABAPENTIN 600 MILLIGRAM(S): 400 CAPSULE ORAL at 23:07

## 2018-05-21 RX ADMIN — GABAPENTIN 600 MILLIGRAM(S): 400 CAPSULE ORAL at 22:03

## 2018-05-21 RX ADMIN — GABAPENTIN 600 MILLIGRAM(S): 400 CAPSULE ORAL at 05:00

## 2018-05-21 RX ADMIN — MIRTAZAPINE 30 MILLIGRAM(S): 45 TABLET, ORALLY DISINTEGRATING ORAL at 22:03

## 2018-05-21 RX ADMIN — METHADONE HYDROCHLORIDE 165 MILLIGRAM(S): 40 TABLET ORAL at 05:00

## 2018-05-21 RX ADMIN — DIVALPROEX SODIUM 1000 MILLIGRAM(S): 500 TABLET, DELAYED RELEASE ORAL at 22:03

## 2018-05-21 RX ADMIN — GABAPENTIN 600 MILLIGRAM(S): 400 CAPSULE ORAL at 12:56

## 2018-05-22 RX ADMIN — GABAPENTIN 600 MILLIGRAM(S): 400 CAPSULE ORAL at 22:15

## 2018-05-22 RX ADMIN — Medication 30 MILLILITER(S): at 09:35

## 2018-05-22 RX ADMIN — DIVALPROEX SODIUM 1000 MILLIGRAM(S): 500 TABLET, DELAYED RELEASE ORAL at 22:14

## 2018-05-22 RX ADMIN — GABAPENTIN 600 MILLIGRAM(S): 400 CAPSULE ORAL at 13:27

## 2018-05-22 RX ADMIN — MIRTAZAPINE 30 MILLIGRAM(S): 45 TABLET, ORALLY DISINTEGRATING ORAL at 22:14

## 2018-05-22 RX ADMIN — METHADONE HYDROCHLORIDE 165 MILLIGRAM(S): 40 TABLET ORAL at 05:09

## 2018-05-22 RX ADMIN — GABAPENTIN 600 MILLIGRAM(S): 400 CAPSULE ORAL at 05:10

## 2018-05-23 LAB — VALPROATE SERPL-MCNC: 58 UG/ML — SIGNIFICANT CHANGE UP (ref 50–100)

## 2018-05-23 RX ORDER — METHADONE HYDROCHLORIDE 40 MG/1
165 TABLET ORAL DAILY
Qty: 0 | Refills: 0 | Status: DISCONTINUED | OUTPATIENT
Start: 2018-05-23 | End: 2018-05-28

## 2018-05-23 RX ORDER — MAGNESIUM HYDROXIDE 400 MG/1
30 TABLET, CHEWABLE ORAL DAILY
Qty: 0 | Refills: 0 | Status: DISCONTINUED | OUTPATIENT
Start: 2018-05-23 | End: 2018-05-28

## 2018-05-23 RX ORDER — DOCUSATE SODIUM 100 MG
100 CAPSULE ORAL
Qty: 0 | Refills: 0 | Status: DISCONTINUED | OUTPATIENT
Start: 2018-05-23 | End: 2018-05-28

## 2018-05-23 RX ADMIN — Medication 100 MILLIGRAM(S): at 22:27

## 2018-05-23 RX ADMIN — GABAPENTIN 600 MILLIGRAM(S): 400 CAPSULE ORAL at 12:16

## 2018-05-23 RX ADMIN — METHADONE HYDROCHLORIDE 165 MILLIGRAM(S): 40 TABLET ORAL at 05:02

## 2018-05-23 RX ADMIN — GABAPENTIN 600 MILLIGRAM(S): 400 CAPSULE ORAL at 22:27

## 2018-05-23 RX ADMIN — GABAPENTIN 600 MILLIGRAM(S): 400 CAPSULE ORAL at 05:01

## 2018-05-23 RX ADMIN — DIVALPROEX SODIUM 1000 MILLIGRAM(S): 500 TABLET, DELAYED RELEASE ORAL at 22:27

## 2018-05-23 RX ADMIN — MIRTAZAPINE 30 MILLIGRAM(S): 45 TABLET, ORALLY DISINTEGRATING ORAL at 22:27

## 2018-05-24 RX ADMIN — DIVALPROEX SODIUM 1000 MILLIGRAM(S): 500 TABLET, DELAYED RELEASE ORAL at 22:20

## 2018-05-24 RX ADMIN — MIRTAZAPINE 30 MILLIGRAM(S): 45 TABLET, ORALLY DISINTEGRATING ORAL at 22:20

## 2018-05-24 RX ADMIN — METHADONE HYDROCHLORIDE 165 MILLIGRAM(S): 40 TABLET ORAL at 05:06

## 2018-05-24 RX ADMIN — Medication 100 MILLIGRAM(S): at 09:08

## 2018-05-24 RX ADMIN — MAGNESIUM HYDROXIDE 30 MILLILITER(S): 400 TABLET, CHEWABLE ORAL at 06:18

## 2018-05-24 RX ADMIN — GABAPENTIN 600 MILLIGRAM(S): 400 CAPSULE ORAL at 05:06

## 2018-05-24 RX ADMIN — GABAPENTIN 600 MILLIGRAM(S): 400 CAPSULE ORAL at 22:20

## 2018-05-25 RX ADMIN — GABAPENTIN 600 MILLIGRAM(S): 400 CAPSULE ORAL at 13:52

## 2018-05-25 RX ADMIN — MIRTAZAPINE 30 MILLIGRAM(S): 45 TABLET, ORALLY DISINTEGRATING ORAL at 23:36

## 2018-05-25 RX ADMIN — METHADONE HYDROCHLORIDE 165 MILLIGRAM(S): 40 TABLET ORAL at 05:05

## 2018-05-25 RX ADMIN — DIVALPROEX SODIUM 1000 MILLIGRAM(S): 500 TABLET, DELAYED RELEASE ORAL at 23:36

## 2018-05-25 RX ADMIN — Medication 100 MILLIGRAM(S): at 13:53

## 2018-05-25 RX ADMIN — Medication 1 APPLICATION(S): at 13:55

## 2018-05-25 RX ADMIN — MAGNESIUM HYDROXIDE 30 MILLILITER(S): 400 TABLET, CHEWABLE ORAL at 13:53

## 2018-05-25 RX ADMIN — GABAPENTIN 600 MILLIGRAM(S): 400 CAPSULE ORAL at 05:06

## 2018-05-26 RX ADMIN — GABAPENTIN 600 MILLIGRAM(S): 400 CAPSULE ORAL at 05:05

## 2018-05-26 RX ADMIN — METHADONE HYDROCHLORIDE 165 MILLIGRAM(S): 40 TABLET ORAL at 05:05

## 2018-05-26 RX ADMIN — GABAPENTIN 600 MILLIGRAM(S): 400 CAPSULE ORAL at 12:40

## 2018-05-26 RX ADMIN — MIRTAZAPINE 30 MILLIGRAM(S): 45 TABLET, ORALLY DISINTEGRATING ORAL at 22:49

## 2018-05-26 RX ADMIN — GABAPENTIN 600 MILLIGRAM(S): 400 CAPSULE ORAL at 22:50

## 2018-05-26 RX ADMIN — DIVALPROEX SODIUM 1000 MILLIGRAM(S): 500 TABLET, DELAYED RELEASE ORAL at 22:49

## 2018-05-27 RX ADMIN — MIRTAZAPINE 30 MILLIGRAM(S): 45 TABLET, ORALLY DISINTEGRATING ORAL at 22:26

## 2018-05-27 RX ADMIN — GABAPENTIN 600 MILLIGRAM(S): 400 CAPSULE ORAL at 12:08

## 2018-05-27 RX ADMIN — DIVALPROEX SODIUM 1000 MILLIGRAM(S): 500 TABLET, DELAYED RELEASE ORAL at 22:26

## 2018-05-27 RX ADMIN — GABAPENTIN 600 MILLIGRAM(S): 400 CAPSULE ORAL at 05:06

## 2018-05-27 RX ADMIN — Medication 30 MILLILITER(S): at 06:04

## 2018-05-27 RX ADMIN — GABAPENTIN 600 MILLIGRAM(S): 400 CAPSULE ORAL at 22:26

## 2018-05-27 RX ADMIN — METHADONE HYDROCHLORIDE 165 MILLIGRAM(S): 40 TABLET ORAL at 05:05

## 2018-05-28 VITALS
HEART RATE: 87 BPM | DIASTOLIC BLOOD PRESSURE: 81 MMHG | RESPIRATION RATE: 20 BRPM | SYSTOLIC BLOOD PRESSURE: 137 MMHG | TEMPERATURE: 97 F

## 2018-05-28 RX ORDER — DIVALPROEX SODIUM 500 MG/1
2 TABLET, DELAYED RELEASE ORAL
Qty: 0 | Refills: 0 | COMMUNITY
Start: 2018-05-28

## 2018-05-28 RX ORDER — MIRTAZAPINE 45 MG/1
1 TABLET, ORALLY DISINTEGRATING ORAL
Qty: 0 | Refills: 0 | COMMUNITY
Start: 2018-05-28

## 2018-05-28 RX ORDER — METHADONE HYDROCHLORIDE 40 MG/1
33 TABLET ORAL
Qty: 0 | Refills: 0 | COMMUNITY
Start: 2018-05-28

## 2018-05-28 RX ORDER — GABAPENTIN 400 MG/1
1 CAPSULE ORAL
Qty: 0 | Refills: 0 | COMMUNITY
Start: 2018-05-28

## 2018-05-28 RX ADMIN — GABAPENTIN 600 MILLIGRAM(S): 400 CAPSULE ORAL at 05:08

## 2018-05-28 RX ADMIN — METHADONE HYDROCHLORIDE 165 MILLIGRAM(S): 40 TABLET ORAL at 05:08

## 2018-05-28 NOTE — CHART NOTE - NSCHARTNOTEFT_GEN_A_CORE
The patient was admitted to the in CDRU, for   ETOH____ Opioid___  Benzo__x__Polysubstance _____ Dependency.    Pt was admitted from ED____, Intake____, Med/surg Floor__x_____.    Details are present in the preceding History & Physical section and follow up chart notes.  patient was evaluated on daily detox team  rounds.  Withdrawal symptoms and signs were reviewed on a prn basis, and the meds were adjusted accordingly.    Labs and imaging results were reviewed and discussed with the patient.    All questions from the patient were addressed.  The patient was seen by the Chemical dependency counselors, and different options for after care were discussed.  The patient attended groups, meetings and 1:1 sessions with the counselors.  Narcane Kit was offered and instructions given prior to discharge.    Psychiatry consultation reviewed___x___, N/A______    Physical therapy evaluation reviewed_____, N/A_x___    Pt was given copies of labs and imaging reports, if applicable.    Prescriptions if needed, were sent through [facility] system to the pharmacy amnd are noted in the discharge instruction sheet.    After care was arranged by counselors and pt was discharged to:    Home___, Outpt. Program__x_, Rehab ___, Long term____ [facility] ____ [facility]____ SNF____, AMA___, Admin Discharge____    Principal Diagnosis: Alcohol Dependency____ Opioid Dependency___ Benzo Dependency__x__ Polysubstance Dependency____

## 2018-05-28 NOTE — CHART NOTE - NSCHARTNOTEFT_GEN_A_CORE
Allergies:        Diet: Regular    Activity: as tolerated    Follow up with    1. PMD in 2 weeks    2. Psych in 2 weeks    3.    Follow up for abnormal labs/tests    1.    Extra Instructions:      Flu Vaccine given  Yes_____         No______      Diagnosis:  Chemical Dependency   Maintain sobriety  refrain from all use      Patient Signature___________________________________________  Date_________________      Nurse Signature_____________________________________________Date_________________

## 2018-05-30 DIAGNOSIS — Z56.0 UNEMPLOYMENT, UNSPECIFIED: ICD-10-CM

## 2018-05-30 DIAGNOSIS — Z51.89 ENCOUNTER FOR OTHER SPECIFIED AFTERCARE: ICD-10-CM

## 2018-05-30 DIAGNOSIS — Z81.1 FAMILY HISTORY OF ALCOHOL ABUSE AND DEPENDENCE: ICD-10-CM

## 2018-05-30 DIAGNOSIS — F17.201 NICOTINE DEPENDENCE, UNSPECIFIED, IN REMISSION: ICD-10-CM

## 2018-05-30 DIAGNOSIS — F13.20 SEDATIVE, HYPNOTIC OR ANXIOLYTIC DEPENDENCE, UNCOMPLICATED: ICD-10-CM

## 2018-05-30 DIAGNOSIS — F43.10 POST-TRAUMATIC STRESS DISORDER, UNSPECIFIED: ICD-10-CM

## 2018-05-30 DIAGNOSIS — F31.9 BIPOLAR DISORDER, UNSPECIFIED: ICD-10-CM

## 2018-05-30 DIAGNOSIS — Z59.0 HOMELESSNESS: ICD-10-CM

## 2018-05-30 DIAGNOSIS — F11.29 OPIOID DEPENDENCE WITH UNSPECIFIED OPIOID-INDUCED DISORDER: ICD-10-CM

## 2018-05-30 DIAGNOSIS — Z91.5 PERSONAL HISTORY OF SELF-HARM: ICD-10-CM

## 2018-05-30 SDOH — ECONOMIC STABILITY - INCOME SECURITY: UNEMPLOYMENT, UNSPECIFIED: Z56.0

## 2018-05-30 SDOH — ECONOMIC STABILITY - HOUSING INSECURITY: HOMELESSNESS: Z59.0

## 2018-09-06 NOTE — PATIENT PROFILE BEHAVIORAL HEALTH - NSBHAPPR_PSY_A_CORE
Mavis with [facility] called stating that pt is declining any further skilled nursing visits  Per nurse, pt states she is feeling good and has no pain and does not think she needs any more visits  She is continuing with PT but will likely be d/c'd this week  Just FYI 
clean

## 2018-12-21 ENCOUNTER — INPATIENT (INPATIENT)
Facility: HOSPITAL | Age: 33
LOS: 16 days | Discharge: HOME | End: 2019-01-07
Attending: PSYCHIATRY & NEUROLOGY | Admitting: PSYCHIATRY & NEUROLOGY
Payer: MEDICAID

## 2018-12-21 VITALS
HEART RATE: 67 BPM | DIASTOLIC BLOOD PRESSURE: 87 MMHG | RESPIRATION RATE: 16 BRPM | TEMPERATURE: 97 F | OXYGEN SATURATION: 99 % | SYSTOLIC BLOOD PRESSURE: 134 MMHG

## 2018-12-21 DIAGNOSIS — F32.9 MAJOR DEPRESSIVE DISORDER, SINGLE EPISODE, UNSPECIFIED: ICD-10-CM

## 2018-12-21 LAB
ANION GAP SERPL CALC-SCNC: 8 MMOL/L — SIGNIFICANT CHANGE UP (ref 7–14)
APAP SERPL-MCNC: <5 UG/ML — LOW (ref 10–30)
BASOPHILS # BLD AUTO: 0.04 K/UL — SIGNIFICANT CHANGE UP (ref 0–0.2)
BASOPHILS NFR BLD AUTO: 0.7 % — SIGNIFICANT CHANGE UP (ref 0–1)
BUN SERPL-MCNC: 11 MG/DL — SIGNIFICANT CHANGE UP (ref 10–20)
CALCIUM SERPL-MCNC: 8.6 MG/DL — SIGNIFICANT CHANGE UP (ref 8.5–10.1)
CHLORIDE SERPL-SCNC: 100 MMOL/L — SIGNIFICANT CHANGE UP (ref 98–110)
CK SERPL-CCNC: 129 U/L — SIGNIFICANT CHANGE UP (ref 0–225)
CO2 SERPL-SCNC: 30 MMOL/L — SIGNIFICANT CHANGE UP (ref 17–32)
CREAT SERPL-MCNC: 0.8 MG/DL — SIGNIFICANT CHANGE UP (ref 0.7–1.5)
EOSINOPHIL # BLD AUTO: 0.41 K/UL — SIGNIFICANT CHANGE UP (ref 0–0.7)
EOSINOPHIL NFR BLD AUTO: 7.4 % — SIGNIFICANT CHANGE UP (ref 0–8)
ETHANOL SERPL-MCNC: <10 MG/DL — HIGH
GLUCOSE SERPL-MCNC: 72 MG/DL — SIGNIFICANT CHANGE UP (ref 70–99)
HCT VFR BLD CALC: 31.3 % — LOW (ref 42–52)
HGB BLD-MCNC: 10.4 G/DL — LOW (ref 14–18)
IMM GRANULOCYTES NFR BLD AUTO: 0.2 % — SIGNIFICANT CHANGE UP (ref 0.1–0.3)
LYMPHOCYTES # BLD AUTO: 1.64 K/UL — SIGNIFICANT CHANGE UP (ref 1.2–3.4)
LYMPHOCYTES # BLD AUTO: 29.6 % — SIGNIFICANT CHANGE UP (ref 20.5–51.1)
MCHC RBC-ENTMCNC: 30.2 PG — SIGNIFICANT CHANGE UP (ref 27–31)
MCHC RBC-ENTMCNC: 33.2 G/DL — SIGNIFICANT CHANGE UP (ref 32–37)
MCV RBC AUTO: 91 FL — SIGNIFICANT CHANGE UP (ref 80–94)
MONOCYTES # BLD AUTO: 0.48 K/UL — SIGNIFICANT CHANGE UP (ref 0.1–0.6)
MONOCYTES NFR BLD AUTO: 8.7 % — SIGNIFICANT CHANGE UP (ref 1.7–9.3)
NEUTROPHILS # BLD AUTO: 2.96 K/UL — SIGNIFICANT CHANGE UP (ref 1.4–6.5)
NEUTROPHILS NFR BLD AUTO: 53.4 % — SIGNIFICANT CHANGE UP (ref 42.2–75.2)
NRBC # BLD: 0 /100 WBCS — SIGNIFICANT CHANGE UP (ref 0–0)
PLATELET # BLD AUTO: 214 K/UL — SIGNIFICANT CHANGE UP (ref 130–400)
POTASSIUM SERPL-MCNC: 4.3 MMOL/L — SIGNIFICANT CHANGE UP (ref 3.5–5)
POTASSIUM SERPL-SCNC: 4.3 MMOL/L — SIGNIFICANT CHANGE UP (ref 3.5–5)
RBC # BLD: 3.44 M/UL — LOW (ref 4.7–6.1)
RBC # FLD: 13.5 % — SIGNIFICANT CHANGE UP (ref 11.5–14.5)
SALICYLATES SERPL-MCNC: <0.3 MG/DL — LOW (ref 4–30)
SODIUM SERPL-SCNC: 138 MMOL/L — SIGNIFICANT CHANGE UP (ref 135–146)
WBC # BLD: 5.54 K/UL — SIGNIFICANT CHANGE UP (ref 4.8–10.8)
WBC # FLD AUTO: 5.54 K/UL — SIGNIFICANT CHANGE UP (ref 4.8–10.8)

## 2018-12-21 RX ORDER — INFLUENZA VIRUS VACCINE 15; 15; 15; 15 UG/.5ML; UG/.5ML; UG/.5ML; UG/.5ML
0.5 SUSPENSION INTRAMUSCULAR ONCE
Qty: 0 | Refills: 0 | Status: COMPLETED | OUTPATIENT
Start: 2018-12-21 | End: 2018-12-21

## 2018-12-21 RX ORDER — CLONAZEPAM 1 MG
0.5 TABLET ORAL
Qty: 0 | Refills: 0 | Status: DISCONTINUED | OUTPATIENT
Start: 2018-12-21 | End: 2018-12-24

## 2018-12-21 RX ORDER — GABAPENTIN 400 MG/1
600 CAPSULE ORAL THREE TIMES A DAY
Qty: 0 | Refills: 0 | Status: DISCONTINUED | OUTPATIENT
Start: 2018-12-21 | End: 2019-01-07

## 2018-12-21 RX ORDER — DIVALPROEX SODIUM 500 MG/1
500 TABLET, DELAYED RELEASE ORAL
Qty: 0 | Refills: 0 | Status: DISCONTINUED | OUTPATIENT
Start: 2018-12-21 | End: 2019-01-07

## 2018-12-21 RX ORDER — MIRTAZAPINE 45 MG/1
30 TABLET, ORALLY DISINTEGRATING ORAL AT BEDTIME
Qty: 0 | Refills: 0 | Status: DISCONTINUED | OUTPATIENT
Start: 2018-12-21 | End: 2019-01-07

## 2018-12-21 RX ADMIN — MIRTAZAPINE 30 MILLIGRAM(S): 45 TABLET, ORALLY DISINTEGRATING ORAL at 21:18

## 2018-12-21 RX ADMIN — DIVALPROEX SODIUM 500 MILLIGRAM(S): 500 TABLET, DELAYED RELEASE ORAL at 21:18

## 2018-12-21 RX ADMIN — GABAPENTIN 600 MILLIGRAM(S): 400 CAPSULE ORAL at 21:18

## 2018-12-21 NOTE — BEHAVIORAL HEALTH ASSESSMENT NOTE - SUMMARY
32 y.o. male with opioid use disorder, on MMTP, sedative/hypnotic use disorder, cannabis use disorder, bipolar disorder and multiple other prior psychiatric diagnosis presenting currently with depression and suicidal ideation

## 2018-12-21 NOTE — ED PROVIDER NOTE - PHYSICAL EXAMINATION
VITAL SIGNS: I have reviewed nursing notes and confirm.  CONSTITUTIONAL: Well-developed; well-nourished; in no acute distress.  SKIN: Skin exam is warm and dry, <2 sec cap refill, b/l foot skin peeling to feet with mild maceration. no erythema, ulcerations, warmth or TTP   HEAD: Normocephalic; atraumatic.  EYES: PERRL, EOM intact; normal conjunctiva.  ENT: MMM; airway clear.   NECK: Supple; non tender.  CARD: RRR, 2+ dp pulses  RESP: No wheezes, rales or rhonchi, speaking in full sentences  ABD: soft non tender.   EXT: Normal ROM. + edema. normal gait  NEURO: Alert, oriented. Grossly unremarkable. No focal deficits.  PSYCH: Cooperative, appropriate,

## 2018-12-21 NOTE — BEHAVIORAL HEALTH ASSESSMENT NOTE - NSBHCHARTREVIEWVS_PSY_A_CORE FT
T(C): 36.1 (12-22-18 @ 15:41), Max: 36.2 (12-22-18 @ 08:15)  HR: 50 (12-22-18 @ 15:41) (50 - 73)  BP: 103/60 (12-22-18 @ 15:41) (95/61 - 131/75)  RR: 17 (12-22-18 @ 15:41) (17 - 18)  SpO2: 99% (12-21-18 @ 20:04) (99% - 99%)

## 2018-12-21 NOTE — ED ADULT NURSE NOTE - NSIMPLEMENTINTERV_GEN_ALL_ED
Implemented All Universal Safety Interventions:  [place] to call system. Call bell, personal items and telephone within reach. Instruct patient to call for assistance. Room bathroom lighting operational. Non-slip footwear when patient is off stretcher. Physically safe environment: no spills, clutter or unnecessary equipment. Stretcher in lowest position, wheels locked, appropriate side rails in place.

## 2018-12-21 NOTE — ED PROVIDER NOTE - NS ED ROS FT
Review of Systems:  CONSTITUTIONAL: no fever  EYES: no photophobia, no blurred vision  ENT: no ear pain, no nasal discharge  RESPIRATORY: no shortness of breath, no cough  CARDIAC: no chest pain, no palpitations  GI: no abd pain, no nausea, no vomiting,   : no dysuria; no hematuria,   MUSCULOSKELETAL: +b/l foot pain   NEUROLOGIC: no headache,   PSYCH: +paranoia, +SI

## 2018-12-21 NOTE — ED PROVIDER NOTE - OBJECTIVE STATEMENT
34 y/o M, h/o polysubstance abuse, PTSD, depression, homelessness, presents requesting a psych eval. Pt states he ran out of his home medications and has not gone to re-fill his medications. he has not taken his medications in 5-7 days. Pt admits has been more paranoid recently and has been carrying weapons. admits to SI. Pt also c/o b/l feet pain worsening for the past year. minimal relief with dry shoes and socks. denies fever, chills, changes in gait, hallucinations, homicidal ideations, n/v, abd pain, recent illness, CP, SOB.

## 2018-12-21 NOTE — H&P ADULT - NSHPPHYSICALEXAM_GEN_ALL_CORE
Vital Signs Last 24 Hrs  T(C): 36.1 (12-21-18 @ 20:04)  T(F): 97 (12-21-18 @ 20:04), Max: 97 (12-21-18 @ 20:04)  HR: 61 (12-21-18 @ 20:04) (61 - 67)  BP: 101/57 (12-21-18 @ 20:04)  BP(mean): --  RR: 18 (12-21-18 @ 20:04) (16 - 18)  SpO2: 99% (12-21-18 @ 20:04) (99% - 99%)  Wt(kg): --

## 2018-12-21 NOTE — H&P ADULT - HISTORY OF PRESENT ILLNESS
32 y/o M, h/o polysubstance abuse, PTSD, depression, homelessness, presents requesting a psych eval. Pt states he ran out of his home medications and has not gone to re-fill his medications. He has not taken his medications in 5-7 days. Pt admits has been more paranoid recently and has been carrying weapons. admits to SI. Pt also c/o b/l feet pain worsening for the past year. minimal relief with dry shoes and socks. denies fever, chills, changes in gait, hallucinations, homicidal ideations, n/v, abd pain, recent illness, CP, SOB.

## 2018-12-21 NOTE — ED PROVIDER NOTE - ATTENDING CONTRIBUTION TO CARE
Pt is a 32yo male with 4d of feeling suicidal since stopping taking his meds 7d ago.  No HI.  Also notes 1 year of skin changes to b/l feet.  No acute changes.    Exam: skin peeling to feet, some areas of maceration without erythema, warmth, or tenderness, calm, cooperative, RRR, CTAB  Imp: medically noncompliant depression  Plan: psych eval

## 2018-12-21 NOTE — H&P ADULT - NSHPLABSRESULTS_GEN_ALL_CORE
10.4   5.54  )-----------( 214      ( 21 Dec 2018 18:02 )             31.3       12-21    138  |  100  |  11  ----------------------------<  72  4.3   |  30  |  0.8    Ca    8.6      21 Dec 2018 18:02                        Lactate Trend      CARDIAC MARKERS ( 21 Dec 2018 18:02 )  x     / x     / 129 U/L / x     / x            CAPILLARY BLOOD GLUCOSE

## 2018-12-21 NOTE — BEHAVIORAL HEALTH ASSESSMENT NOTE - DETAILS
Pt reports multiple prior SA- most recent 1 month ago when he tried to slit his wrist, prior to this he OD on heroin after the death of his father Haldol (received as prn) - dystonic reaction per chart pt has family history of aunt in mental institute and uncle who is pedaphile Father  at age 58 from pancreatic cancer Deferred +Rash on feet

## 2018-12-21 NOTE — BEHAVIORAL HEALTH ASSESSMENT NOTE - HPI (INCLUDE ILLNESS QUALITY, SEVERITY, DURATION, TIMING, CONTEXT, MODIFYING FACTORS, ASSOCIATED SIGNS AND SYMPTOMS)
Information obtained from patient and chart review  32 y.o. undomiciled (currently staying in a shelter), single, unemployed male with opioid use disorder, on MMTP ([facility] [phone]), sedative / hypnotic use disorder, cannabis use disorder and reports pph bipolar disorder, PTSD, remote history of OCD and Tourettes as a child presenting today with depressed mood, sad about holidays, suicidal thoughts with intent and no plan. frustrated and unable to cope with his loneliness and lack of support. not sleeping well, poor concentration. denies using any drugs recently. only taking prescribed medications, which ran out few days ago.     States he has been feeling depressed since his father passed away in Sept 2016 but reports his symptoms have gotten worse over the last couple of months. Reports low mood, hypersomnia, anhedonia, low energy, feelings of worthlessness, and passive death wishes with periodic suicidal ideations. Pt reports he has had 4 prior suicide attempts with most recent prior to his admission to [facility] 3-4 weeks ago after he cut his wrist. Pt attributes exacerbation of symptoms due to not speaking to his family because he is on methadone (Which is why he states he was seeking detox from methadone today),     Pt has long history of substance use- Started using Marijuana at age 13 and attributes to quitting school to drug use (in 8th grade). Reports starting to use heroin at age 16 while in prision. Pt was using IV heroin previously. Pt reports being on benzos for 10 years and taking up to 8mg of Xanax previously and then in the last year being place on Klonopin. States he was previously on 2mg q8 but currently on 1mg q8. Istop checked (Reference # [id number]) and shows pt had his last Klonopin 1mg dose filled on 4/2/18 at [address] [place] [address] Pharmacy and received 90 tablets (30 day supply)- written by Dr. Davy Monzon. Pt states his Klonopin is in the shelter locker and will likely be destroyed. Pt also reports smoking Marijuana 1-2 joints per week. Pt reports 1 prior rehab and several prior detox (In [facility] in 2017) and reports no opioid use other than his MMTP for the last 9 months. States he has gotten xanax of the streets other than what is prescribed to him periodically. Pt reports withdrawal seizures in the past- most recently several months ago.

## 2018-12-21 NOTE — ED ADULT TRIAGE NOTE - CHIEF COMPLAINT QUOTE
As per patient: 'Dipti been having trouble walking. My feet is hurting and it is weeping with some kind of oily stuff. And I ran out of my psych meds for a week now.

## 2018-12-22 DIAGNOSIS — F43.10 POST-TRAUMATIC STRESS DISORDER, UNSPECIFIED: ICD-10-CM

## 2018-12-22 PROBLEM — F19.10 OTHER PSYCHOACTIVE SUBSTANCE ABUSE, UNCOMPLICATED: Chronic | Status: ACTIVE | Noted: 2018-04-25

## 2018-12-22 RX ORDER — METHADONE HYDROCHLORIDE 40 MG/1
205 TABLET ORAL ONCE
Qty: 0 | Refills: 0 | Status: DISCONTINUED | OUTPATIENT
Start: 2018-12-23 | End: 2018-12-23

## 2018-12-22 RX ORDER — METHADONE HYDROCHLORIDE 40 MG/1
205 TABLET ORAL ONCE
Qty: 0 | Refills: 0 | Status: DISCONTINUED | OUTPATIENT
Start: 2018-12-22 | End: 2018-12-22

## 2018-12-22 RX ADMIN — Medication 0.5 MILLIGRAM(S): at 21:01

## 2018-12-22 RX ADMIN — METHADONE HYDROCHLORIDE 205 MILLIGRAM(S): 40 TABLET ORAL at 09:32

## 2018-12-22 RX ADMIN — DIVALPROEX SODIUM 500 MILLIGRAM(S): 500 TABLET, DELAYED RELEASE ORAL at 09:34

## 2018-12-22 RX ADMIN — MIRTAZAPINE 30 MILLIGRAM(S): 45 TABLET, ORALLY DISINTEGRATING ORAL at 21:02

## 2018-12-22 RX ADMIN — DIVALPROEX SODIUM 500 MILLIGRAM(S): 500 TABLET, DELAYED RELEASE ORAL at 21:01

## 2018-12-22 RX ADMIN — GABAPENTIN 600 MILLIGRAM(S): 400 CAPSULE ORAL at 09:34

## 2018-12-22 RX ADMIN — GABAPENTIN 600 MILLIGRAM(S): 400 CAPSULE ORAL at 21:02

## 2018-12-22 NOTE — CONSULT NOTE ADULT - SUBJECTIVE AND OBJECTIVE BOX
JACK FITZGERALD  33y  Male      Patient is a 33y old  Male who presents with a chief complaint of 33 YEARS OLD MALE COME TO ER FOR PSYCHIATRIC EVALUATION . (21 Dec 2018 20:56)    HPI:  34 y/o M, h/o polysubstance abuse, PTSD, depression, homelessness, presents requesting a psych eval. Pt states he ran out of his home medications and has not gone to re-fill his medications. He has not taken his medications in 5-7 days. Pt admits has been more paranoid recently and has been carrying weapons. admits to SI. Pt also c/o b/l feet pain worsening for the past year. minimal relief with dry shoes and socks. denies fever, chills, changes in gait, hallucinations, homicidal ideations, n/v, abd pain, recent illness, CP, SOB. (21 Dec 2018 20:56)    INTERVAL HPI/OVERNIGHT EVENTS:  HEALTH ISSUES - PROBLEM Dx:  Major depression: Major depression    ambulating on unti in nad      PAST MEDICAL & SURGICAL HISTORY:  Polysubstance abuse  PTSD (post-traumatic stress disorder)  Depression, unspecified depression type  Suicidal ideation  No significant past surgical history    FAMILY HISTORY:  Family history of pancreatic cancer (Father)    clonazePAM Tablet 0.5 milliGRAM(s) Oral two times a day PRN  diVALproex  milliGRAM(s) Oral two times a day  gabapentin 600 milliGRAM(s) Oral three times a day  influenza   Vaccine 0.5 milliLiter(s) IntraMuscular once  mirtazapine 30 milliGRAM(s) Oral at bedtime      REVIEW OF SYSTEMS:  CONSTITUTIONAL: No fever, weight loss, or fatigue  EYES: No eye pain, visual disturbances, or discharge  ENMT:  No difficulty hearing, tinnitus, vertigo; No sinus or throat pain  NECK: No pain or stiffness  BREASTS: No pain, masses, or nipple discharge  RESPIRATORY: No cough, wheezing, chills or hemoptysis; No shortness of breath  CARDIOVASCULAR: No chest pain, palpitations, dizziness, or leg swelling  GASTROINTESTINAL: No abdominal or epigastric pain. No nausea, vomiting, or hematemesis; No diarrhea or constipation. No melena or hematochezia.  GENITOURINARY: No dysuria, frequency, hematuria, or incontinence  NEUROLOGICAL: No headaches, memory loss, loss of strength, numbness, or tremors  SKIN: No itching, burning, rashes, or lesions   LYMPH NODES: No enlarged glands  ENDOCRINE: No heat or cold intolerance; No hair loss  MUSCULOSKELETAL: No joint pain or swelling; No muscle, back, or extremity pain  PSYCHIATRIC: as per hpi and previous psych history  HEME/LYMPH: No easy bruising, or bleeding gums  ALLERY AND IMMUNOLOGIC: No hives or eczema    T(C): 36.2 (12-22-18 @ 08:15), Max: 36.2 (12-22-18 @ 08:15)  HR: 73 (12-22-18 @ 08:15) (56 - 73)  BP: 129/78 (12-22-18 @ 08:15) (95/61 - 134/87)  RR: 18 (12-22-18 @ 08:15) (16 - 18)  SpO2: 99% (12-21-18 @ 20:04) (99% - 99%)  Wt(kg): --Vital Signs Last 24 Hrs  T(C): 36.2 (22 Dec 2018 08:15), Max: 36.2 (22 Dec 2018 08:15)  T(F): 97.1 (22 Dec 2018 08:15), Max: 97.1 (22 Dec 2018 08:15)  HR: 73 (22 Dec 2018 08:15) (56 - 73)  BP: 129/78 (22 Dec 2018 08:15) (95/61 - 134/87)  BP(mean): --  RR: 18 (22 Dec 2018 08:15) (16 - 18)  SpO2: 99% (21 Dec 2018 20:04) (99% - 99%)    PHYSICAL EXAM:  GENERAL: NAD,well-developed  HEAD:  Atraumatic, Normocephalic  EYES: EOMI, PERRLA, conjunctiva and sclera clear  ENMT: No tonsillar erythema, exudates, or enlargement; Moist mucous membranes, Good dentition, No lesions  NECK: Supple, No JVD, Normal thyroid  NERVOUS SYSTEM:  Alert & Oriented X3, Good concentration; Motor Strength 5/5 B/L upper and lower extremities; DTRs 2+ intact and symmetric  CHEST/LUNG: Clear bs bilaterally; No rales, rhonchi, wheezing  HEART: Regular rate and rhythm; No murmurs, rubs, or gallops  ABDOMEN: Soft, Nontender, Nondistended; Bowel sounds present  EXTREMITIES:  2+ Peripheral Pulses, No clubbing, cyanosis, or edema  LYMPH: No lymphadenopathy noted  SKIN: No rashes or lesions  Neuro: alert  no focal deficits    Consultant(s) Notes Reviewed:  [x ] YES  [ ] NO  Care Discussed with Consultants/Other Providers [ x] YES  [ ] NO    LABS:                        10.4   5.54  )-----------( 214      ( 21 Dec 2018 18:02 )             31.3     12-21    138  |  100  |  11  ----------------------------<  72  4.3   |  30  |  0.8    Ca    8.6      21 Dec 2018 18:02          CAPILLARY BLOOD GLUCOSE                RADIOLOGY & ADDITIONAL TESTS:    Imaging Personally Reviewed:  [ ] YES  [ ] NO

## 2018-12-23 DIAGNOSIS — F32.2 MAJOR DEPRESSIVE DISORDER, SINGLE EPISODE, SEVERE WITHOUT PSYCHOTIC FEATURES: ICD-10-CM

## 2018-12-23 RX ADMIN — DIVALPROEX SODIUM 500 MILLIGRAM(S): 500 TABLET, DELAYED RELEASE ORAL at 17:01

## 2018-12-23 RX ADMIN — METHADONE HYDROCHLORIDE 205 MILLIGRAM(S): 40 TABLET ORAL at 08:33

## 2018-12-23 RX ADMIN — Medication 0.5 MILLIGRAM(S): at 20:41

## 2018-12-23 RX ADMIN — GABAPENTIN 600 MILLIGRAM(S): 400 CAPSULE ORAL at 17:01

## 2018-12-23 RX ADMIN — GABAPENTIN 600 MILLIGRAM(S): 400 CAPSULE ORAL at 08:20

## 2018-12-23 RX ADMIN — GABAPENTIN 600 MILLIGRAM(S): 400 CAPSULE ORAL at 21:07

## 2018-12-23 RX ADMIN — DIVALPROEX SODIUM 500 MILLIGRAM(S): 500 TABLET, DELAYED RELEASE ORAL at 08:20

## 2018-12-23 RX ADMIN — MIRTAZAPINE 30 MILLIGRAM(S): 45 TABLET, ORALLY DISINTEGRATING ORAL at 21:07

## 2018-12-24 DIAGNOSIS — F13.10 SEDATIVE, HYPNOTIC OR ANXIOLYTIC ABUSE, UNCOMPLICATED: ICD-10-CM

## 2018-12-24 DIAGNOSIS — F31.62 BIPOLAR DISORDER, CURRENT EPISODE MIXED, MODERATE: ICD-10-CM

## 2018-12-24 DIAGNOSIS — F11.99 OPIOID USE, UNSPECIFIED WITH UNSPECIFIED OPIOID-INDUCED DISORDER: ICD-10-CM

## 2018-12-24 RX ORDER — METHADONE HYDROCHLORIDE 40 MG/1
205 TABLET ORAL DAILY
Qty: 0 | Refills: 0 | Status: DISCONTINUED | OUTPATIENT
Start: 2018-12-25 | End: 2019-01-01

## 2018-12-24 RX ORDER — NICOTINE POLACRILEX 2 MG
2 GUM BUCCAL
Qty: 0 | Refills: 0 | Status: DISCONTINUED | OUTPATIENT
Start: 2018-12-24 | End: 2019-01-07

## 2018-12-24 RX ORDER — METHADONE HYDROCHLORIDE 40 MG/1
205 TABLET ORAL ONCE
Qty: 0 | Refills: 0 | Status: DISCONTINUED | OUTPATIENT
Start: 2018-12-24 | End: 2018-12-24

## 2018-12-24 RX ORDER — HYDROXYZINE HCL 10 MG
50 TABLET ORAL EVERY 6 HOURS
Qty: 0 | Refills: 0 | Status: DISCONTINUED | OUTPATIENT
Start: 2018-12-24 | End: 2019-01-07

## 2018-12-24 RX ADMIN — GABAPENTIN 600 MILLIGRAM(S): 400 CAPSULE ORAL at 08:30

## 2018-12-24 RX ADMIN — DIVALPROEX SODIUM 500 MILLIGRAM(S): 500 TABLET, DELAYED RELEASE ORAL at 08:30

## 2018-12-24 RX ADMIN — DIVALPROEX SODIUM 500 MILLIGRAM(S): 500 TABLET, DELAYED RELEASE ORAL at 18:43

## 2018-12-24 RX ADMIN — GABAPENTIN 600 MILLIGRAM(S): 400 CAPSULE ORAL at 13:04

## 2018-12-24 RX ADMIN — Medication 0.5 MILLIGRAM(S): at 13:04

## 2018-12-24 RX ADMIN — METHADONE HYDROCHLORIDE 205 MILLIGRAM(S): 40 TABLET ORAL at 10:34

## 2018-12-24 RX ADMIN — MIRTAZAPINE 30 MILLIGRAM(S): 45 TABLET, ORALLY DISINTEGRATING ORAL at 21:06

## 2018-12-24 RX ADMIN — GABAPENTIN 600 MILLIGRAM(S): 400 CAPSULE ORAL at 21:06

## 2018-12-24 NOTE — PROVIDER CONTACT NOTE (OTHER) - SITUATION
Admitted 12/21 for depression and suicidal thoughts. No significant medical history. Goes to [facility] is currently on 205mg MMTP. Spoke with Johnna and verified dose.

## 2018-12-24 NOTE — PROVIDER CONTACT NOTE (OTHER) - RECOMMENDATIONS
Notified MD Carter of the verification of methadone dose (205mg daily). Administer as prescribed and monitor VS and any changes.

## 2018-12-24 NOTE — CHART NOTE - NSCHARTNOTEFT_GEN_A_CORE
Social Work Admit Note:    Patient is 33 years of age male who was admitted for worsening depression.  At time of assessment in the emergency department patient endorsed feeling sad about the holidays and having suicidal thoughts with no plan.  He also verbalized frustration with loneliness, and no supports.  Other symptoms endorsed included poor sleep, and poor concentration.  Current substance use denied.  Patient does have history of opioid use, cannabis use, and sedative / hypnotic use disorder.  Current use denied.       Rene informed treatment team this morning that his depression started when his father passed away in September 2016.  His symptoms have been getting worse over the past couple of months.  During this time he has experienced low mood, hyper insomnia, anhedonia, low energy and feelings of worthlessness.  He provided information regarding heroin use from age [age] while in assisted.  He also used via IV.  For ten years he was abusing benzos.  Rene has history of multiple admissions to detox and history of inpatient rehabilitation.  He also has history of suicide attempt by attempting to cut his wrist one month ago.     In the community patient has been staying at a shelter.  [facility]  Discharge plan to be determined in collaboration with patient and treatment team.      Please refer to Social Work Psychosocial for additional information.

## 2018-12-24 NOTE — PROVIDER CONTACT NOTE (OTHER) - ASSESSMENT
Depressed mood. Oriented x 4. Thoughts clear. Spoke with Johnna at program and verified dose of 205mg Methadone daily. Last dose was on 12/21/18.

## 2018-12-24 NOTE — PROGRESS NOTE BEHAVIORAL HEALTH - NSBHFUPINTERVALHXFT_PSY_A_CORE
During his stay at the hospital, he has been sleeping okay, his appetite has been okay and he denies any suicidal ideations reporting "I feel safe here". See additional HPI above.     Attempted to contact Dr. Sanchez (PMD at his MM program) at [phone] (on call doctor [phone]) but she is away for holiday. Will retry     Will obtain collateral from mother (Rhoda [phone]). SW has reached out.

## 2018-12-24 NOTE — PROVIDER CONTACT NOTE (OTHER) - BACKGROUND
Admitted 12/21 for depression and suicidal thoughts. No significant medical history. Goes to [facility] is currently on 205mg MMTP. Housing disposition.
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
## 2018-12-24 NOTE — PROGRESS NOTE BEHAVIORAL HEALTH - NSBHADDHXPSYCHFT_PSY_A_CORE
Multiple IPP admissions, most recently discharged from [facility] 2.5 weeks ago. 1 prior SA 1.5 years by overdosing 10 bags of heroin after his father passed away.

## 2018-12-24 NOTE — PROGRESS NOTE BEHAVIORAL HEALTH - NSBHADDHXSUBSTFT_PSY_A_CORE
Opioid use disorder, on Methadone maintenance. Patient denies any benzo abuse but chart review shows recent detox admissions for benzos (may 2018).

## 2018-12-24 NOTE — PROGRESS NOTE BEHAVIORAL HEALTH - SUMMARY
31 y/o  male with Opioid Use DO on Methadone maintenance, cannabis use disorder, reported Bipolar DO and multiple other prior psychiatric diagnosis, numerous prior IPP admissions, 1 reported suicide attempt, presenting currently with depression and suicidal ideation with plan to buy a gun and kill himself or jump off the bridge.     Patient meets criteria for current depressive episode as marked by poor sleep, feelings of guilt, worthlessness, hopelessness and suicidal ideation. Patient also endorses talking to himself but denies any auditory or visual hallucinations. He also reports periods consistent with hypomania. He reports benefit with Remeron and Depakote, but self-terminated as he ran out and could not find an outpatient provider. Will re-initiate and monitor.      Plan:  1) Methadone 205mg QD (as [facility] from [facility] in [facility]). Will order UDS.    2) Re-initiate Depakote 500mg PO BID and Remeron 30mg PO QHS. Atarax PRN for anxiety.   3) Attempted to reach Dr. Sanchez, unavailable due to holidays. Will re-attempt.

## 2018-12-24 NOTE — PROVIDER CONTACT NOTE (OTHER) - ACTION/TREATMENT ORDERED:
Spoke with Johnna at program and verified dose. Orders were placed by MD Carter for Methadone, 205mg, PO and administered as prescribed.

## 2018-12-25 RX ADMIN — MIRTAZAPINE 30 MILLIGRAM(S): 45 TABLET, ORALLY DISINTEGRATING ORAL at 21:08

## 2018-12-25 RX ADMIN — Medication 50 MILLIGRAM(S): at 21:08

## 2018-12-25 RX ADMIN — GABAPENTIN 600 MILLIGRAM(S): 400 CAPSULE ORAL at 13:47

## 2018-12-25 RX ADMIN — METHADONE HYDROCHLORIDE 205 MILLIGRAM(S): 40 TABLET ORAL at 08:09

## 2018-12-25 RX ADMIN — Medication 50 MILLIGRAM(S): at 10:10

## 2018-12-25 RX ADMIN — GABAPENTIN 600 MILLIGRAM(S): 400 CAPSULE ORAL at 21:07

## 2018-12-25 RX ADMIN — DIVALPROEX SODIUM 500 MILLIGRAM(S): 500 TABLET, DELAYED RELEASE ORAL at 07:01

## 2018-12-25 RX ADMIN — DIVALPROEX SODIUM 500 MILLIGRAM(S): 500 TABLET, DELAYED RELEASE ORAL at 21:08

## 2018-12-25 RX ADMIN — GABAPENTIN 600 MILLIGRAM(S): 400 CAPSULE ORAL at 07:01

## 2018-12-25 NOTE — PROGRESS NOTE BEHAVIORAL HEALTH - NSBHFUPINTERVALHXFT_PSY_A_CORE
32 yo M w mdd. Encountered in day room drinking coffee with peers in the day room. States that he is "doing fine" with no complaints. Denies SI, feels safe on unit.

## 2018-12-26 LAB
CHOLEST SERPL-MCNC: 161 MG/DL — SIGNIFICANT CHANGE UP (ref 100–200)
HDLC SERPL-MCNC: 53 MG/DL — SIGNIFICANT CHANGE UP
LIPID PNL WITH DIRECT LDL SERPL: 99 MG/DL — SIGNIFICANT CHANGE UP (ref 4–129)
TOTAL CHOLESTEROL/HDL RATIO MEASUREMENT: 3 RATIO — LOW (ref 4–5.5)
TRIGL SERPL-MCNC: 80 MG/DL — SIGNIFICANT CHANGE UP (ref 10–149)

## 2018-12-26 RX ADMIN — DIVALPROEX SODIUM 500 MILLIGRAM(S): 500 TABLET, DELAYED RELEASE ORAL at 21:11

## 2018-12-26 RX ADMIN — METHADONE HYDROCHLORIDE 205 MILLIGRAM(S): 40 TABLET ORAL at 08:53

## 2018-12-26 RX ADMIN — GABAPENTIN 600 MILLIGRAM(S): 400 CAPSULE ORAL at 12:53

## 2018-12-26 RX ADMIN — MIRTAZAPINE 30 MILLIGRAM(S): 45 TABLET, ORALLY DISINTEGRATING ORAL at 21:11

## 2018-12-26 RX ADMIN — DIVALPROEX SODIUM 500 MILLIGRAM(S): 500 TABLET, DELAYED RELEASE ORAL at 08:51

## 2018-12-26 RX ADMIN — GABAPENTIN 600 MILLIGRAM(S): 400 CAPSULE ORAL at 08:52

## 2018-12-26 RX ADMIN — GABAPENTIN 600 MILLIGRAM(S): 400 CAPSULE ORAL at 21:11

## 2018-12-26 NOTE — PROGRESS NOTE ADULT - SUBJECTIVE AND OBJECTIVE BOX
pt stable alert in NAD  no new complaints    MAJOR DEPRESSION  ^FEET PAIN/PSYCH EVAL  Family history of pancreatic cancer (Father)  No pertinent family history in first degree relatives  Handoff  MEWS Score  Polysubstance abuse  PTSD (post-traumatic stress disorder)  Depression, unspecified depression type  Suicidal ideation  Major depression  Benzodiazepine abuse  Opioid use disorder  Bipolar disorder, current episode mixed, moderate  Current severe episode of major depressive disorder without psychotic features without prior episode  PTSD (post-traumatic stress disorder)  Major depression  No significant past surgical history  FEET PAIN/PSYCH EVAL  90+  Homelessness    HEALTH ISSUES - PROBLEM Dx:  Benzodiazepine abuse  Opioid use disorder  Bipolar disorder, current episode mixed, moderate  Current severe episode of major depressive disorder without psychotic features without prior episode  PTSD (post-traumatic stress disorder): PTSD (post-traumatic stress disorder)  Major depression: Major depression        PAST MEDICAL & SURGICAL HISTORY:  Polysubstance abuse  PTSD (post-traumatic stress disorder)  Depression, unspecified depression type  Suicidal ideation  No significant past surgical history    Haldol (Dystonic RXN)  Talwin (Unknown)      FAMILY HISTORY:  Family history of pancreatic cancer (Father)      diVALproex  milliGRAM(s) Oral two times a day  gabapentin 600 milliGRAM(s) Oral three times a day  hydrOXYzine hydrochloride 50 milliGRAM(s) Oral every 6 hours PRN  methadone    Tablet 205 milliGRAM(s) Oral daily  mirtazapine 30 milliGRAM(s) Oral at bedtime  nicotine  Polacrilex Gum 2 milliGRAM(s) Oral every 2 hours PRN      T(C): 35.6 (12-26-18 @ 06:06), Max: 36.3 (12-25-18 @ 09:30)  HR: 60 (12-26-18 @ 06:06) (60 - 78)  BP: 106/65 (12-26-18 @ 06:06) (106/65 - 133/76)  RR: 18 (12-26-18 @ 06:06) (16 - 18)  SpO2: --    PE;  general:  no changes from previous    Lungs:    Heart:    EXT:    Neuro:  alert no defciits                          CAPILLARY BLOOD GLUCOSE

## 2018-12-26 NOTE — CHART NOTE - NSCHARTNOTEFT_GEN_A_CORE
Social Work Note:    Treatment team met with patient.  He was not able to tolerate interview and left his room abruptly after slamming his door.

## 2018-12-26 NOTE — PROGRESS NOTE BEHAVIORAL HEALTH - SUMMARY
31 y/o  male with Opioid Use DO on Methadone maintenance, cannabis use disorder, reported Bipolar DO and multiple other prior psychiatric diagnosis, numerous prior IPP admissions, 1 reported suicide attempt, presented with symptoms of depressive episode and suicidal ideation with plan to buy a gun and kill himself or jump off the bridge. Patient also endorsed talking to himself but denied any auditory or visual hallucinations and reported periods consistent with hypomania. He was re-initiated Remeron and Depakote.     Patient today unable to engage in meaningful interview due to his anger about not getting Klonopin, storming out of the room and slamming the door. Will continue to monitor.       Plan:  1) Methadone 205mg QD (as [facility] from [facility]). F/u UDS.    2) Continue Depakote 500mg PO BID and Remeron 30mg PO QHS. Atarax PRN for anxiety.   3) Will re-attempted to reach Dr. Sanchez, unavailable due to holidays.

## 2018-12-26 NOTE — PROGRESS NOTE BEHAVIORAL HEALTH - NSBHFUPINTERVALHXFT_PSY_A_CORE
Patient evaluated at bedside, reports his day is "not good". Patient expresses anger that is Klonopin was stopped. Team attempted to explain, however, patient became angry very quickly and walked out the room- slamming the door. Patient also made threatening statements towards team. Unable to engage in further interview due to escalated mood.

## 2018-12-27 LAB
AMPHET UR-MCNC: NEGATIVE — SIGNIFICANT CHANGE UP
BARBITURATES UR SCN-MCNC: NEGATIVE — SIGNIFICANT CHANGE UP
BENZODIAZ UR-MCNC: NEGATIVE — SIGNIFICANT CHANGE UP
COCAINE METAB.OTHER UR-MCNC: NEGATIVE — SIGNIFICANT CHANGE UP
METHADONE UR-MCNC: POSITIVE
OPIATES UR-MCNC: NEGATIVE — SIGNIFICANT CHANGE UP
PCP SPEC-MCNC: SIGNIFICANT CHANGE UP
PROPOXYPHENE QUALITATIVE URINE RESULT: NEGATIVE — SIGNIFICANT CHANGE UP

## 2018-12-27 RX ADMIN — DIVALPROEX SODIUM 500 MILLIGRAM(S): 500 TABLET, DELAYED RELEASE ORAL at 20:18

## 2018-12-27 RX ADMIN — GABAPENTIN 600 MILLIGRAM(S): 400 CAPSULE ORAL at 08:07

## 2018-12-27 RX ADMIN — GABAPENTIN 600 MILLIGRAM(S): 400 CAPSULE ORAL at 14:09

## 2018-12-27 RX ADMIN — DIVALPROEX SODIUM 500 MILLIGRAM(S): 500 TABLET, DELAYED RELEASE ORAL at 08:07

## 2018-12-27 RX ADMIN — METHADONE HYDROCHLORIDE 205 MILLIGRAM(S): 40 TABLET ORAL at 08:08

## 2018-12-27 RX ADMIN — MIRTAZAPINE 30 MILLIGRAM(S): 45 TABLET, ORALLY DISINTEGRATING ORAL at 20:18

## 2018-12-27 RX ADMIN — GABAPENTIN 600 MILLIGRAM(S): 400 CAPSULE ORAL at 20:18

## 2018-12-27 NOTE — PROGRESS NOTE BEHAVIORAL HEALTH - NSBHFUPINTERVALHXFT_PSY_A_CORE
Patient observed sitting in day room, drinking coffee calmly, follows team into room for private interview. He is irritable on exam, but without agitation today. He states his mood is "alright", adding "I got a lot on my mind" but does not elaborate. When asked about suicidal ideation, he responds "not a the moment" stating the last time he had these thoughts were on admission. Denies AH/VH. No other complaints, other than peeling rash on foot. Discussed podiatry consult.     As per staff, he received Atarax last night for insomnia. Otherwise remains in good behavioral control. Patient observed sitting in day room, drinking coffee calmly, follows team into room for private interview. He is irritable on exam, but without agitation today. He states his mood is "alright", adding "I got a lot on my mind" but does not elaborate. When asked about suicidal ideation, he responds "not a the moment" stating the last time he had these thoughts were on admission. Denies AH/VH. No other complaints, other than peeling rash on foot. Discussed podiatry consult. Pt is compliant with medication, denied side effects. Endorsed fair sleep and appetite.     As per staff, he received Atarax last night for insomnia. Otherwise remains in good behavioral control.

## 2018-12-27 NOTE — PROGRESS NOTE BEHAVIORAL HEALTH - SUMMARY
33 y/o  male with Opioid Use DO on Methadone maintenance, cannabis use disorder, reported Bipolar DO and multiple other prior psychiatric diagnosis, numerous prior IPP admissions, 1 reported suicide attempt, presented with symptoms of depressive episode and suicidal ideation with plan to buy a gun and kill himself or jump off the bridge. Patient also endorsed talking to himself but denied any auditory or visual hallucinations and reported periods consistent with hypomania. He was re-initiated Remeron and Depakote.     Patient today remains irritable, but complies with interview. Denies acute complaints, denies any suicidal ideations/auditory or visual hallucinations. Remains complaint with medications. Rash on feet observed, will consult podiatry.     Plan:  1) Methadone 205mg QD (as [facility] from [facility] in [facility]).    2) Continue Depakote 500mg PO BID and Remeron 30mg PO QHS. Atarax PRN for anxiety.   3) Left a message with medical staff for Dr. Sanchez to call back.

## 2018-12-28 LAB
ESTIMATED AVERAGE GLUCOSE: 97 MG/DL — SIGNIFICANT CHANGE UP (ref 68–114)
HBA1C BLD-MCNC: 5 % — SIGNIFICANT CHANGE UP (ref 4–5.6)

## 2018-12-28 PROCEDURE — 99221 1ST HOSP IP/OBS SF/LOW 40: CPT

## 2018-12-28 RX ADMIN — Medication 1 APPLICATION(S): at 20:11

## 2018-12-28 RX ADMIN — DIVALPROEX SODIUM 500 MILLIGRAM(S): 500 TABLET, DELAYED RELEASE ORAL at 08:20

## 2018-12-28 RX ADMIN — Medication 50 MILLIGRAM(S): at 11:33

## 2018-12-28 RX ADMIN — MIRTAZAPINE 30 MILLIGRAM(S): 45 TABLET, ORALLY DISINTEGRATING ORAL at 20:11

## 2018-12-28 RX ADMIN — GABAPENTIN 600 MILLIGRAM(S): 400 CAPSULE ORAL at 20:11

## 2018-12-28 RX ADMIN — GABAPENTIN 600 MILLIGRAM(S): 400 CAPSULE ORAL at 08:20

## 2018-12-28 RX ADMIN — GABAPENTIN 600 MILLIGRAM(S): 400 CAPSULE ORAL at 13:10

## 2018-12-28 RX ADMIN — METHADONE HYDROCHLORIDE 205 MILLIGRAM(S): 40 TABLET ORAL at 08:23

## 2018-12-28 RX ADMIN — DIVALPROEX SODIUM 500 MILLIGRAM(S): 500 TABLET, DELAYED RELEASE ORAL at 20:11

## 2018-12-28 NOTE — CONSULT NOTE ADULT - ATTENDING COMMENTS
Patient relates pruritis to the dorsum of both feet. Gross hyperkeratosis the plantar aspects of both feet. Recommend Urea 20% bid to apply the plantar foot and betamethasone valerate 0.1% ointment to the dorsum QD    Thank you for allowing me to participate in your patient care   Andrew Mccartney DPM

## 2018-12-28 NOTE — CONSULT NOTE ADULT - REASON FOR ADMISSION
33 YEARS OLD MALE COME TO ER FOR PSYCHIATRIC EVALUATION .
33 YEARS OLD MALE COME TO ER FOR PSYCHIATRIC EVALUATION .

## 2018-12-28 NOTE — CONSULT NOTE ADULT - SUBJECTIVE AND OBJECTIVE BOX
Patient is a 33y old  Male who presents with a chief complaint of 33 YEARS OLD MALE COME TO ER FOR PSYCHIATRIC EVALUATION . (26 Dec 2018 08:27)    Patient seen at bedside with attending. Patient relates that his feet are itching and hae has not been doing anything about the dry skin. Patient did not recall the duration of the itching. Patient denies any N/V/F/C/SOB at this time.     HPI:  32 y/o M, h/o polysubstance abuse, PTSD, depression, homelessness, presents requesting a psych eval. Pt states he ran out of his home medications and has not gone to re-fill his medications. He has not taken his medications in 5-7 days. Pt admits has been more paranoid recently and has been carrying weapons. admits to SI. Pt also c/o b/l feet pain worsening for the past year. minimal relief with dry shoes and socks. denies fever, chills, changes in gait, hallucinations, homicidal ideations, n/v, abd pain, recent illness, CP, SOB. (21 Dec 2018 20:56)          PAST MEDICAL & SURGICAL HISTORY:  Polysubstance abuse  PTSD (post-traumatic stress disorder)  Depression, unspecified depression type  Suicidal ideation  No significant past surgical history      Allergies    Haldol (Dystonic RXN)  Talwin (Unknown)    Intolerances        MEDICATIONS  (STANDING):  betamethasone valerate 0.1% Lotion 1 Application(s) Topical two times a day  diVALproex  milliGRAM(s) Oral two times a day  gabapentin 600 milliGRAM(s) Oral three times a day  methadone    Tablet 205 milliGRAM(s) Oral daily  mirtazapine 30 milliGRAM(s) Oral at bedtime    MEDICATIONS  (PRN):  hydrOXYzine hydrochloride 50 milliGRAM(s) Oral every 6 hours PRN Anxiety  nicotine  Polacrilex Gum 2 milliGRAM(s) Oral every 2 hours PRN smoking cessation      FAMILY HISTORY:  Family history of pancreatic cancer (Father)      SOCIAL HISTORY:     REVIEW OF SYSTEMS:    PHYSICAL EXAM:    Vital Signs Last 24 Hrs  T(C): 36.1 (28 Dec 2018 05:53), Max: 36.9 (27 Dec 2018 17:52)  T(F): 96.9 (28 Dec 2018 05:53), Max: 98.4 (27 Dec 2018 17:52)  HR: 60 (28 Dec 2018 05:53) (60 - 83)  BP: 94/53 (28 Dec 2018 05:53) (94/53 - 124/80)  BP(mean): --  RR: 20 (28 Dec 2018 05:53) (18 - 20)  SpO2: --    Patient seen at bedside. Patient NAD and AAOx3.  Vascular: DP/PT pulses palpable bilaterally. CFT <3 seconds to all digits bilaterally. Skin temperature warm to cool from proximal to distal bilaterally.   Neurologic: Light touch sensation intact bilaterally.  Musculoskeletal: No pain to palpation noted bilaterally.  Dermatological: Pruritic areas on plantar and dorsal aspect of the feet. Dry, scaling skin noted to bilateral plantar feet with few areas of fissuring.        LABS: Not currently available    A:  Bilateral foot dermatitis    P:  Patient examined and evaluated.   Order placed for betamethasone to be applied twice a day  Please recall PRN  Patient to follow up with podiatry as OP.

## 2018-12-28 NOTE — PROGRESS NOTE BEHAVIORAL HEALTH - SUMMARY
33 y/o  male with Opioid Use DO on Methadone maintenance, cannabis use disorder, reported Bipolar DO and multiple other prior psychiatric diagnosis, numerous prior IPP admissions, 1 reported suicide attempt, presented with symptoms of depressive episode and suicidal ideation with plan to buy a gun and kill himself or jump off the bridge. Patient also endorsed talking to himself but denied any auditory or visual hallucinations and reported periods consistent with hypomania. He was re-initiated Remeron and Depakote.     Patient today appears to be improved in mood. Denies acute complaints, denies any suicidal ideations/auditory or visual hallucinations. Remains complaint with medications.     Plan:  1) Methadone 205mg QD (as [facility] from [facility]).    2) Continue Depakote 500mg PO BID and Remeron 30mg PO QHS. Atarax PRN for anxiety.   3) Betamethasone for foot dermatitis, podiatry consult appreciated. 31 y/o  male with Opioid Use DO on Methadone maintenance, cannabis use disorder, reported Bipolar DO and multiple other prior psychiatric diagnosis, numerous prior IPP admissions, 1 reported suicide attempt, presented with symptoms of depressive episode and suicidal ideation with plan to buy a gun and kill himself or jump off the bridge. Patient also endorsed talking to himself but denied any auditory or visual hallucinations and reported periods consistent with hypomania. He was re-initiated Remeron and Depakote.     Patient today appears to be improved in mood. Denies acute complaints, denies any suicidal ideations/auditory or visual hallucinations. Remains complaint with medications.     Plan:  1) Methadone 205mg QD (as [facility] from [facility] in [facility]).    2) F/u Depakote level on 12/31/18. Continue Depakote 500mg PO BID and Remeron 30mg PO QHS. Atarax PRN for anxiety.   3) Betamethasone for foot dermatitis, podiatry consult appreciated.

## 2018-12-28 NOTE — CHART NOTE - NSCHARTNOTEFT_GEN_A_CORE
Social Work Note:    Patient present with low mood and on interview endorses depression.  He clarified that he does not have suicidal ideation on the unit but has thought of ways to commit suicide after he is discharged from the hospital.  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      Rene has a mother who he maintains intermittent contact with and a brother with whom he has a good relationship but does not live locally.  During the day patient is observed to be in the day room.  He is encouraged to attend groups that are offered during the day. Discharge plan is for patient to return to the shelter.     At this time patient is not psychiatrically stable for discharge.

## 2018-12-28 NOTE — PROGRESS NOTE BEHAVIORAL HEALTH - NSBHFUPINTERVALHXFT_PSY_A_CORE
Patient evaluated at bedside, reports feeling "alright", states he slept okay and has been eating okay. No acute complaints. Denies any suicidal ideations, states he feels safe on the unit.     As per nursing, no behavioral issues. He has been compliant with medications, and observed ambulating the unit and attending groups. Patient evaluated at bedside, reports feeling "alright", states he slept okay and has been eating okay. No acute complaints. Denies any suicidal ideations, states he feels safe on the unit. Compliant with medication, denied side effects. Pt states that there is "nothing that he looks forward to."    As per nursing, no behavioral issues. He has been compliant with medications, and observed ambulating the unit and attending groups.

## 2018-12-29 LAB — TSH SERPL-MCNC: 1.69 UIU/ML — SIGNIFICANT CHANGE UP (ref 0.27–4.2)

## 2018-12-29 RX ADMIN — Medication 50 MILLIGRAM(S): at 10:36

## 2018-12-29 RX ADMIN — MIRTAZAPINE 30 MILLIGRAM(S): 45 TABLET, ORALLY DISINTEGRATING ORAL at 20:17

## 2018-12-29 RX ADMIN — GABAPENTIN 600 MILLIGRAM(S): 400 CAPSULE ORAL at 20:17

## 2018-12-29 RX ADMIN — DIVALPROEX SODIUM 500 MILLIGRAM(S): 500 TABLET, DELAYED RELEASE ORAL at 20:17

## 2018-12-29 RX ADMIN — GABAPENTIN 600 MILLIGRAM(S): 400 CAPSULE ORAL at 08:02

## 2018-12-29 RX ADMIN — DIVALPROEX SODIUM 500 MILLIGRAM(S): 500 TABLET, DELAYED RELEASE ORAL at 08:02

## 2018-12-29 RX ADMIN — GABAPENTIN 600 MILLIGRAM(S): 400 CAPSULE ORAL at 13:05

## 2018-12-29 RX ADMIN — Medication 1 APPLICATION(S): at 11:05

## 2018-12-29 RX ADMIN — METHADONE HYDROCHLORIDE 205 MILLIGRAM(S): 40 TABLET ORAL at 08:04

## 2018-12-29 RX ADMIN — Medication 1 APPLICATION(S): at 20:16

## 2018-12-30 RX ADMIN — Medication 1 APPLICATION(S): at 20:29

## 2018-12-30 RX ADMIN — DIVALPROEX SODIUM 500 MILLIGRAM(S): 500 TABLET, DELAYED RELEASE ORAL at 20:29

## 2018-12-30 RX ADMIN — MIRTAZAPINE 30 MILLIGRAM(S): 45 TABLET, ORALLY DISINTEGRATING ORAL at 20:29

## 2018-12-30 RX ADMIN — Medication 1 APPLICATION(S): at 08:02

## 2018-12-30 RX ADMIN — DIVALPROEX SODIUM 500 MILLIGRAM(S): 500 TABLET, DELAYED RELEASE ORAL at 08:01

## 2018-12-30 RX ADMIN — GABAPENTIN 600 MILLIGRAM(S): 400 CAPSULE ORAL at 20:29

## 2018-12-30 RX ADMIN — METHADONE HYDROCHLORIDE 205 MILLIGRAM(S): 40 TABLET ORAL at 08:01

## 2018-12-30 RX ADMIN — GABAPENTIN 600 MILLIGRAM(S): 400 CAPSULE ORAL at 12:25

## 2018-12-30 RX ADMIN — GABAPENTIN 600 MILLIGRAM(S): 400 CAPSULE ORAL at 08:01

## 2018-12-31 LAB — VALPROATE SERPL-MCNC: 53 UG/ML — SIGNIFICANT CHANGE UP (ref 50–100)

## 2018-12-31 RX ADMIN — METHADONE HYDROCHLORIDE 205 MILLIGRAM(S): 40 TABLET ORAL at 08:01

## 2018-12-31 RX ADMIN — DIVALPROEX SODIUM 500 MILLIGRAM(S): 500 TABLET, DELAYED RELEASE ORAL at 20:19

## 2018-12-31 RX ADMIN — GABAPENTIN 600 MILLIGRAM(S): 400 CAPSULE ORAL at 07:55

## 2018-12-31 RX ADMIN — GABAPENTIN 600 MILLIGRAM(S): 400 CAPSULE ORAL at 20:19

## 2018-12-31 RX ADMIN — GABAPENTIN 600 MILLIGRAM(S): 400 CAPSULE ORAL at 13:01

## 2018-12-31 RX ADMIN — DIVALPROEX SODIUM 500 MILLIGRAM(S): 500 TABLET, DELAYED RELEASE ORAL at 09:43

## 2018-12-31 RX ADMIN — Medication 1 APPLICATION(S): at 08:01

## 2018-12-31 RX ADMIN — MIRTAZAPINE 30 MILLIGRAM(S): 45 TABLET, ORALLY DISINTEGRATING ORAL at 20:19

## 2018-12-31 NOTE — PROGRESS NOTE BEHAVIORAL HEALTH - SUMMARY
33 y/o  male with Opioid Use DO on Methadone maintenance, cannabis use disorder, reported Bipolar DO and multiple other prior psychiatric diagnosis, numerous prior IPP admissions, 1 reported suicide attempt, presented with symptoms of depressive episode and suicidal ideation with plan to buy a gun and kill himself or jump off the bridge. Patient also endorsed talking to himself but denied any auditory or visual hallucinations and reported periods consistent with hypomania. He was re-initiated Remeron and Depakote.     Patient remains stable in mood, denies any suicidal ideations/auditory or visual hallucinations. Remains complaint with medications, however, preoccupied with wanting to have Klonopin. Informed that Klonopin was not going to be prescribed as there is no indication for it.     Plan:  1) Methadone 205mg QD (as verified from [facility] in [facility]).    2) Depakote level 53 (12/31/18). Continue Depakote 500mg PO BID and Remeron 30mg PO QHS. Atarax PRN for anxiety.   3) Betamethasone for foot dermatitis, podiatry consult appreciated.

## 2018-12-31 NOTE — CHART NOTE - NSCHARTNOTEFT_GEN_A_CORE
Social Work Chart Note:    Patient was seen during morning rounds, meeting with clinical team of psychiatrist and .  Current treatment interventions and medication management was discussed.     Patient is alert and oriented x3, and reports difficultly falling asleep and fair appetite.  Patient endorses that he is experiencing headaches, teeth grinding and uncontrolled twitching within the context of not taking any Klonopin, and attributed his physical symptoms to no longer taking such medication.  Patient reports no other chief physical or mental health complaints.  Patient reports no SI, HI or AV hallucinations, and reports no intent to cause harm to self or others upon discharge.  He continues to comply with current treatment interventions and takes medications as prescribed.  Patient is visible on the unit and engages with other patients and staff members.  There are no behavioral outbursts to report.    Patient reports that he had an argument with his mother on [place] and is still not talking to her.  He states that he does not know what he will do after discharge from [facility].  He expressed concern about following up with supportive services that will help him secure financial stability and assist with employment.     Attending will determine discharge date once stable.     Patient to remain on unit until psychiatrically stable for discharge. At this time, patient is not cleared for discharge.

## 2018-12-31 NOTE — PROGRESS NOTE BEHAVIORAL HEALTH - NSBHFUPINTERVALHXFT_PSY_A_CORE
Patient evaluated at bedside, chart review. He complains of not being able to sleep due to anxiety, headaches, teeth grinding and twitching because "I need Klonopin". Offered support, psychoeducation and informed that he can take Atarax if feeling anxious to which he responds "I'm taking it, besides its just like Benadryl". (As per chart review, last dose of Atarax PRN was dec 29th). Reports his mood to be "okay", denies SI. Denies AH/VH. Future goals discussed, he expresses concerns about not being employed.     Otherwise, no other complaints. Remains visible on the unit, in good behavioral control.

## 2018-12-31 NOTE — PROGRESS NOTE BEHAVIORAL HEALTH - OTHER
Left eye with +Subconjunctival hemorrhage, laceration and ecchymoses; appears drowsy
"okay"
angry
"alright"

## 2019-01-01 LAB
EDDP UR CFM-MCNC: SIGNIFICANT CHANGE UP NG/MG CREAT
METHADONE UR CFM-MCNC: 9979 NG/MG CREAT — SIGNIFICANT CHANGE UP
METHADONE UR CFM-MCNC: ABNORMAL

## 2019-01-01 RX ORDER — MAGNESIUM HYDROXIDE 400 MG/1
30 TABLET, CHEWABLE ORAL DAILY
Qty: 0 | Refills: 0 | Status: DISCONTINUED | OUTPATIENT
Start: 2019-01-01 | End: 2019-01-07

## 2019-01-01 RX ADMIN — Medication 1 APPLICATION(S): at 08:10

## 2019-01-01 RX ADMIN — Medication 50 MILLIGRAM(S): at 12:44

## 2019-01-01 RX ADMIN — DIVALPROEX SODIUM 500 MILLIGRAM(S): 500 TABLET, DELAYED RELEASE ORAL at 20:29

## 2019-01-01 RX ADMIN — MIRTAZAPINE 30 MILLIGRAM(S): 45 TABLET, ORALLY DISINTEGRATING ORAL at 20:30

## 2019-01-01 RX ADMIN — GABAPENTIN 600 MILLIGRAM(S): 400 CAPSULE ORAL at 12:44

## 2019-01-01 RX ADMIN — GABAPENTIN 600 MILLIGRAM(S): 400 CAPSULE ORAL at 08:10

## 2019-01-01 RX ADMIN — METHADONE HYDROCHLORIDE 205 MILLIGRAM(S): 40 TABLET ORAL at 08:11

## 2019-01-01 RX ADMIN — DIVALPROEX SODIUM 500 MILLIGRAM(S): 500 TABLET, DELAYED RELEASE ORAL at 08:10

## 2019-01-01 RX ADMIN — Medication 1 APPLICATION(S): at 20:30

## 2019-01-01 RX ADMIN — GABAPENTIN 600 MILLIGRAM(S): 400 CAPSULE ORAL at 20:30

## 2019-01-01 RX ADMIN — MAGNESIUM HYDROXIDE 30 MILLILITER(S): 400 TABLET, CHEWABLE ORAL at 20:35

## 2019-01-02 RX ORDER — METHADONE HYDROCHLORIDE 40 MG/1
205 TABLET ORAL DAILY
Qty: 0 | Refills: 0 | Status: DISCONTINUED | OUTPATIENT
Start: 2019-01-02 | End: 2019-01-07

## 2019-01-02 RX ADMIN — GABAPENTIN 600 MILLIGRAM(S): 400 CAPSULE ORAL at 20:17

## 2019-01-02 RX ADMIN — METHADONE HYDROCHLORIDE 205 MILLIGRAM(S): 40 TABLET ORAL at 08:42

## 2019-01-02 RX ADMIN — Medication 50 MILLIGRAM(S): at 12:13

## 2019-01-02 RX ADMIN — MIRTAZAPINE 30 MILLIGRAM(S): 45 TABLET, ORALLY DISINTEGRATING ORAL at 20:17

## 2019-01-02 RX ADMIN — Medication 1 APPLICATION(S): at 20:17

## 2019-01-02 RX ADMIN — Medication 1 APPLICATION(S): at 08:33

## 2019-01-02 RX ADMIN — GABAPENTIN 600 MILLIGRAM(S): 400 CAPSULE ORAL at 13:59

## 2019-01-02 RX ADMIN — GABAPENTIN 600 MILLIGRAM(S): 400 CAPSULE ORAL at 08:33

## 2019-01-02 RX ADMIN — DIVALPROEX SODIUM 500 MILLIGRAM(S): 500 TABLET, DELAYED RELEASE ORAL at 20:17

## 2019-01-02 NOTE — PROGRESS NOTE BEHAVIORAL HEALTH - SUMMARY
33 y/o  male with Opioid Use DO on Methadone maintenance, cannabis use disorder, reported Bipolar DO and multiple other prior psychiatric diagnosis, numerous prior IPP admissions, 1 reported suicide attempt, presented with symptoms of depressive episode and suicidal ideation with plan to buy a gun and kill himself or jump off the bridge. Patient also endorsed talking to himself but denied any auditory or visual hallucinations and reported periods consistent with hypomania. He was re-initiated Remeron and Depakote.     Patient remains stable in mood, denies any suicidal ideations/auditory or visual hallucinations. Remains complaint with medications, however, preoccupied with wanting to have Klonopin. Informed that Klonopin was not going to be prescribed as there is no indication for it.     Plan:  1) Methadone 205mg QD (as verified from [facility] program in [facility]).    2) Depakote level 53 (12/31/18). Continue Depakote 500mg PO BID and Remeron 30mg PO QHS. Atarax PRN for anxiety.   3) Betamethasone for foot dermatitis, podiatry consult appreciated.

## 2019-01-02 NOTE — PROGRESS NOTE BEHAVIORAL HEALTH - NSBHFUPINTERVALHXFT_PSY_A_CORE
Patient evaluated at bedside, chart review. As per nursing staff, no overnight events. He complains of not being able to sleep due to anxiety, headaches, teeth grinding and twitching because "I need Klonopin". Offered support, psychoeducation and informed that he can take Atarax if feeling anxious. Otherwise, no other complaints. Remains visible on the unit, in good behavioral control. Denied A/V hallucinations. Denied suicidal/homicidal ideation, intent or plan. Is compliant with medication, denied side effects.

## 2019-01-02 NOTE — PROGRESS NOTE ADULT - SUBJECTIVE AND OBJECTIVE BOX
pt stable alert in NAD  no new complaints    MAJOR DEPRESSION  ^FEET PAIN/PSYCH EVAL  Family history of pancreatic cancer (Father)  No pertinent family history in first degree relatives  Handoff  MEWS Score  Polysubstance abuse  PTSD (post-traumatic stress disorder)  Depression, unspecified depression type  Suicidal ideation  Major depression  Benzodiazepine abuse  Opioid use disorder  Bipolar disorder, current episode mixed, moderate  Current severe episode of major depressive disorder without psychotic features without prior episode  PTSD (post-traumatic stress disorder)  Major depression  No significant past surgical history  FEET PAIN/PSYCH EVAL  90+  Homelessness    HEALTH ISSUES - PROBLEM Dx:  Benzodiazepine abuse  Opioid use disorder  Bipolar disorder, current episode mixed, moderate  Current severe episode of major depressive disorder without psychotic features without prior episode  PTSD (post-traumatic stress disorder): PTSD (post-traumatic stress disorder)  Major depression: Major depression        PAST MEDICAL & SURGICAL HISTORY:  Polysubstance abuse  PTSD (post-traumatic stress disorder)  Depression, unspecified depression type  Suicidal ideation  No significant past surgical history    Haldol (Dystonic RXN)  Talwin (Unknown)      FAMILY HISTORY:  Family history of pancreatic cancer (Father)      betamethasone valerate 0.1% Lotion 1 Application(s) Topical two times a day  diVALproex  milliGRAM(s) Oral two times a day  gabapentin 600 milliGRAM(s) Oral three times a day  hydrOXYzine hydrochloride 50 milliGRAM(s) Oral every 6 hours PRN  magnesium hydroxide Suspension 30 milliLiter(s) Oral daily PRN  methadone    Tablet 205 milliGRAM(s) Oral daily  mirtazapine 30 milliGRAM(s) Oral at bedtime  nicotine  Polacrilex Gum 2 milliGRAM(s) Oral every 2 hours PRN      T(C): 36.4 (01-01-19 @ 16:24), Max: 36.4 (01-01-19 @ 16:24)  HR: 85 (01-01-19 @ 16:24) (75 - 85)  BP: 146/84 (01-01-19 @ 16:24) (103/55 - 146/84)  RR: 18 (01-01-19 @ 16:24) (18 - 20)  SpO2: --    PE;    general:  no deficits in nad    Lungs:    Heart:    EXT:    Neuro:  alert                           CAPILLARY BLOOD GLUCOSE

## 2019-01-02 NOTE — CHART NOTE - NSCHARTNOTEFT_GEN_A_CORE
Social Work Note:    Treatment team meeting with patient earlier this morning.  At that time patient was able to engage appropriately.  He endorsed poor sleep and poor concentration.  Treatment plan, medications, and discharge plan discussed.  Plan of referral to inpatient rehab being considered by patient.      Insurance provider has been in contact with this worker.  A  will meet with patient tomorrow at 3pm to discuss discharge planning options.  Patient made aware and is agreeable to meeting with this representative.     At this time patient is not psychiatrically stable for discharge.

## 2019-01-03 RX ADMIN — DIVALPROEX SODIUM 500 MILLIGRAM(S): 500 TABLET, DELAYED RELEASE ORAL at 08:16

## 2019-01-03 RX ADMIN — Medication 1 APPLICATION(S): at 20:20

## 2019-01-03 RX ADMIN — Medication 1 APPLICATION(S): at 08:21

## 2019-01-03 RX ADMIN — DIVALPROEX SODIUM 500 MILLIGRAM(S): 500 TABLET, DELAYED RELEASE ORAL at 20:17

## 2019-01-03 RX ADMIN — GABAPENTIN 600 MILLIGRAM(S): 400 CAPSULE ORAL at 12:12

## 2019-01-03 RX ADMIN — METHADONE HYDROCHLORIDE 205 MILLIGRAM(S): 40 TABLET ORAL at 08:19

## 2019-01-03 RX ADMIN — GABAPENTIN 600 MILLIGRAM(S): 400 CAPSULE ORAL at 20:17

## 2019-01-03 RX ADMIN — MIRTAZAPINE 30 MILLIGRAM(S): 45 TABLET, ORALLY DISINTEGRATING ORAL at 20:17

## 2019-01-03 RX ADMIN — Medication 50 MILLIGRAM(S): at 12:13

## 2019-01-03 RX ADMIN — GABAPENTIN 600 MILLIGRAM(S): 400 CAPSULE ORAL at 08:16

## 2019-01-03 NOTE — PROGRESS NOTE BEHAVIORAL HEALTH - SUMMARY
33 y/o  male with Opioid Use DO on Methadone maintenance, cannabis use disorder, reported Bipolar DO and multiple other prior psychiatric diagnosis, numerous prior IPP admissions, 1 reported suicide attempt, presented with symptoms of depressive episode and suicidal ideation with plan to buy a gun and kill himself or jump off the bridge. Patient also endorsed talking to himself but denied any auditory or visual hallucinations and reported periods consistent with hypomania. He was re-initiated Remeron and Depakote.     Patient remains stable in mood, denies any suicidal ideations/auditory or visual hallucinations. Remains complaint with medications. No other acute issues. Likely discharge early next week to Rehab, if patient still amenable.      Plan:  1) Methadone 205mg QD (as verified from [facility] in [facility]).    2) Depakote level 53 (12/31/18). Continue Depakote 500mg PO BID and Remeron 30mg PO QHS. Atarax PRN for anxiety.   3) Betamethasone for foot dermatitis, podiatry consult appreciated.

## 2019-01-03 NOTE — PROGRESS NOTE BEHAVIORAL HEALTH - NSBHFUPINTERVALHXFT_PSY_A_CORE
Patient observed sitting in day room, drinking coffee, follows team into room for private interview. Patient denies any acute complaints, denies AH/VH, denies SI. Reports to team that he's been thinking of rehab. Anticipated to meet with [facility] today.     As per nursing round, no behavioral issues. Patient compliant with medications. Patient observed sitting in day room, drinking coffee, follows team into room for private interview. Patient denies any acute complaints, denies AH/VH, denies SI. Reports to team that he's been thinking of rehab. Anticipated to meet with [facility] today. Endorsed good sleep and appetite.     As per nursing round, no behavioral issues. Patient compliant with medications.

## 2019-01-04 RX ADMIN — MIRTAZAPINE 30 MILLIGRAM(S): 45 TABLET, ORALLY DISINTEGRATING ORAL at 20:18

## 2019-01-04 RX ADMIN — Medication 1 APPLICATION(S): at 08:02

## 2019-01-04 RX ADMIN — Medication 1 APPLICATION(S): at 20:19

## 2019-01-04 RX ADMIN — Medication 50 MILLIGRAM(S): at 11:55

## 2019-01-04 RX ADMIN — METHADONE HYDROCHLORIDE 205 MILLIGRAM(S): 40 TABLET ORAL at 08:04

## 2019-01-04 RX ADMIN — GABAPENTIN 600 MILLIGRAM(S): 400 CAPSULE ORAL at 20:18

## 2019-01-04 RX ADMIN — GABAPENTIN 600 MILLIGRAM(S): 400 CAPSULE ORAL at 12:46

## 2019-01-04 RX ADMIN — DIVALPROEX SODIUM 500 MILLIGRAM(S): 500 TABLET, DELAYED RELEASE ORAL at 20:18

## 2019-01-04 RX ADMIN — GABAPENTIN 600 MILLIGRAM(S): 400 CAPSULE ORAL at 08:02

## 2019-01-04 NOTE — DISCHARGE NOTE BEHAVIORAL HEALTH - HPI (INCLUDE ILLNESS QUALITY, SEVERITY, DURATION, TIMING, CONTEXT, MODIFYING FACTORS, ASSOCIATED SIGNS AND SYMPTOMS)
32 y.o. undomiciled (currently staying in a shelter), single, unemployed male with opioid use disorder, on MMTP ([facility] [phone]), sedative / hypnotic use disorder, cannabis use disorder and reports pph bipolar disorder, PTSD, remote history of OCD and Tourettes as a child presenting today with depressed mood, sad about holidays, suicidal thoughts with intent and no plan. frustrated and unable to cope with his loneliness and lack of support. not sleeping well, poor concentration. denies using any drugs recently. only taking prescribed medications, which ran out few days ago.   States he has been feeling depressed since his father passed away in Sept 2016 but reports his symptoms have gotten worse over the last couple of months. Reports low mood, hypersomnia, anhedonia, low energy, feelings of worthlessness, and passive death wishes with periodic suicidal ideations. Pt reports he has had 4 prior suicide attempts with most recent prior to his admission to [facility] 3-4 weeks ago after he cut his wrist. Pt attributes exacerbation of symptoms due to not speaking to his family because he is on methadone (Which is why he states he was seeking detox from methadone today).

## 2019-01-04 NOTE — PROGRESS NOTE BEHAVIORAL HEALTH - THOUGHT CONTENT
Unremarkable
Preoccupations
Suicidality/Preoccupations
Unremarkable
Preoccupations
Unremarkable
Unremarkable
Preoccupations

## 2019-01-04 NOTE — PROGRESS NOTE BEHAVIORAL HEALTH - NSBHADMITIPBHPROVFT_PSY_A_CORE
Attempted to reach Dr. Sanchez, unavailable

## 2019-01-04 NOTE — PROGRESS NOTE BEHAVIORAL HEALTH - NSBHFUPINTERVALCCFT_PSY_A_CORE
"I need my Klonopin"
"I'm alright"
"I'm thinking about going to Rehab"
"Im alchynaht"
"Not good"
I am very depressed. They took away my klonopin and risperdal. I am not suicidal now
"I need Klonopin"
"I'm alright"

## 2019-01-04 NOTE — DISCHARGE NOTE BEHAVIORAL HEALTH - NSBHDCSUBSTHXFT_PSY_A_CORE
Pt has long history of substance use- Started using Marijuana at age 13 and attributes to quitting school to drug use (in 8th grade). Reports starting to use heroin at age 16 while in prision. Pt was using IV heroin previously. Pt reports being on benzos for 10 years and taking up to 8mg of Xanax previously and then in the last year being place on Klonopin. States he was previously on 2mg q8 but currently on 1mg q8. Istop checked (Reference # [id number]) and shows pt had his last Klonopin 1mg dose filled on 4/2/18 at [address] [place] [address] Pharmacy and received 90 tablets (30 day supply)- written by Dr. Davy Monzon. Pt states his Klonopin is in the shelter locker and will likely be destroyed. Pt also reports smoking Marijuana 1-2 joints per week. Pt reports 1 prior rehab and several prior detox (In [facility] in 2017) and reports no opioid use other than his MMTP for the last 9 months. States he has gotten xanax of the streets other than what is prescribed to him periodically. Pt reports withdrawal seizures in the past- most recently several months ago.  Currently on MMTP and denies other opioid use for last 9 months.

## 2019-01-04 NOTE — PROGRESS NOTE BEHAVIORAL HEALTH - NSBHCHARTREVIEWVS_PSY_A_CORE FT
ICU Vital Signs Last 24 Hrs  T(C): 36.6 (02 Jan 2019 10:00), Max: 36.6 (02 Jan 2019 10:00)  T(F): 97.9 (02 Jan 2019 10:00), Max: 97.9 (02 Jan 2019 10:00)  HR: 99 (02 Jan 2019 10:00) (85 - 99)  BP: 116/56 (02 Jan 2019 10:00) (116/56 - 146/84)  BP(mean): --  ABP: --  ABP(mean): --  RR: 20 (02 Jan 2019 10:00) (18 - 20)  SpO2: --
Vital Signs Last 24 Hrs  T(C): 36.1 (26 Dec 2018 09:52), Max: 36.3 (25 Dec 2018 17:03)  T(F): 97 (26 Dec 2018 09:52), Max: 97.4 (25 Dec 2018 17:03)  HR: 75 (26 Dec 2018 09:52) (60 - 75)  BP: 121/82 (26 Dec 2018 09:52) (106/65 - 124/78)  BP(mean): --  RR: 18 (26 Dec 2018 09:52) (18 - 18)  SpO2: --
Vital Signs Last 24 Hrs  T(C): 36.3 (03 Jan 2019 09:13), Max: 36.7 (02 Jan 2019 20:32)  T(F): 97.3 (03 Jan 2019 09:13), Max: 98 (02 Jan 2019 20:32)  HR: 80 (03 Jan 2019 09:13) (70 - 80)  BP: 120/70 (03 Jan 2019 09:13) (110/59 - 120/70)  BP(mean): --  RR: 18 (03 Jan 2019 09:13) (18 - 18)  SpO2: --
Vital Signs Last 24 Hrs  T(C): 36.3 (28 Dec 2018 08:53), Max: 36.9 (27 Dec 2018 17:52)  T(F): 97.3 (28 Dec 2018 08:53), Max: 98.4 (27 Dec 2018 17:52)  HR: 82 (28 Dec 2018 08:53) (60 - 83)  BP: 117/61 (28 Dec 2018 08:53) (94/53 - 124/80)  BP(mean): --  RR: 18 (28 Dec 2018 08:53) (18 - 20)  SpO2: --
Vital Signs Last 24 Hrs  T(C): 36.4 (24 Dec 2018 09:00), Max: 36.4 (24 Dec 2018 09:00)  T(F): 97.5 (24 Dec 2018 09:00), Max: 97.5 (24 Dec 2018 09:00)  HR: 74 (24 Dec 2018 09:00) (51 - 74)  BP: 110/63 (24 Dec 2018 09:00) (108/56 - 119/57)  BP(mean): --  RR: 18 (24 Dec 2018 09:00) (17 - 18)  SpO2: --
Vital Signs Last 24 Hrs  T(C): 36.7 (31 Dec 2018 09:14), Max: 36.7 (31 Dec 2018 09:14)  T(F): 98.1 (31 Dec 2018 09:14), Max: 98.1 (31 Dec 2018 09:14)  HR: 78 (31 Dec 2018 09:14) (58 - 78)  BP: 111/70 (31 Dec 2018 09:14) (104/59 - 111/70)  BP(mean): --  RR: 17 (31 Dec 2018 09:14) (16 - 18)  SpO2: --
Vital Signs Last 24 Hrs  T(C): 36.3 (27 Dec 2018 09:05), Max: 36.3 (27 Dec 2018 09:05)  T(F): 97.4 (27 Dec 2018 09:05), Max: 97.4 (27 Dec 2018 09:05)  HR: 80 (27 Dec 2018 09:05) (56 - 80)  BP: 107/75 (27 Dec 2018 09:05) (100/55 - 107/75)  BP(mean): --  RR: 18 (27 Dec 2018 09:05) (18 - 18)  SpO2: --
ICU Vital Signs Last 24 Hrs  T(C): 36.1 (04 Jan 2019 09:52), Max: 37.4 (03 Jan 2019 18:14)  T(F): 97 (04 Jan 2019 09:52), Max: 99.3 (03 Jan 2019 18:14)  HR: 84 (04 Jan 2019 09:52) (80 - 84)  BP: 115/62 (04 Jan 2019 09:52) (115/62 - 125/70)  BP(mean): --  ABP: --  ABP(mean): --  RR: 16 (04 Jan 2019 09:52) (16 - 18)  SpO2: --

## 2019-01-04 NOTE — PROGRESS NOTE BEHAVIORAL HEALTH - NSBHFUPINTERVALHXFT_PSY_A_CORE
Patient observed sitting in day room, drinking coffee, follows team into room for private interview. Patient denies any acute complaints, denies AH/VH, denies SI. Reports to team that he's been thinking of rehab. Anticipated to meet with [facility] today. Endorsed good sleep and appetite.     As per nursing round, no behavioral issues. Patient compliant with medications. Pt was seen, evaluated, chart reviewed.  As per nursing staff, On evaluation, pt reports that he is doing alright, did not offer new complaints. Pt has been calm and cooperative on the unit. No behavioral outbursts. Stated that he does not want to go to rehab. Is compliant with medication, denies negative side effects. Endorsed good sleep and appetite. Denied A/V hallucinations. Denied paranoia. Denied suicidal/homicidal ideation, intent or plan.

## 2019-01-04 NOTE — PROGRESS NOTE BEHAVIORAL HEALTH - AXIS III
Tinea pedis

## 2019-01-04 NOTE — PROGRESS NOTE BEHAVIORAL HEALTH - CASE SUMMARY
Pt was seen and discussed with the resident. Chart reviewed. Agree with assessment and plan above. On evaluation, pt states that he is "a little better." Endorsed that he is "thinking about rehab." Waiting for a visit to hear about the [facility]. Denied side effect from medication. Denied suicidal/homicidal ideation, intent or plan. Continue with medications as above.
Pt was seen and discussed with the resident. Chart reviewed. Agree with assessment and plan above. On evaluation, pt is somewhat irritable however not agitated. Is calmer. No behavioral outbursts on the unit. Continue with medications as above.
Pt was seen and discussed with the resident. Chart reviewed. Agree with assessment and plan above. On evaluation, pt states that he is "a little better." Endorsed that he is "thinking about rehab." Waiting for a visit to hear about the [facility]. Denied side effect from medication. Denied suicidal/homicidal ideation, intent or plan. Continue with medications as above.
Pt was seen and discussed with the resident. Chart reviewed. Agree with assessment and plan above. On evaluation, pt was angry due to Klonopin not being prescribed. Pt does not have any withdrawal symptoms. Made threats towards staff and provider. Continue with medications as above.
Pt was seen and discussed with the resident. Chart reviewed. Agree with assessment and plan above. Continue with medications as above.
Pt was seen and discussed with the resident. Chart reviewed. Agree with assessment and plan above. Continue with medications as above.

## 2019-01-04 NOTE — PROGRESS NOTE BEHAVIORAL HEALTH - NSBHPTASSESSDT_PSY_A_CORE
02-Jan-2019 10:38
03-Jan-2019 10:44
04-Jan-2019 11:08
23-Dec-2018 17:56
24-Dec-2018 14:59
25-Dec-2018 11:17
26-Dec-2018 11:24
28-Dec-2018 13:04
27-Dec-2018 10:36
31-Dec-2018 14:26

## 2019-01-04 NOTE — DISCHARGE NOTE BEHAVIORAL HEALTH - NSBHDCRESPONSEFT_PSY_A_CORE
Pt has made significant progress over the course of hospitalization. With continuous psychotherapy from the treatment team and the medications, patient reports feeling better, sleeping and eating well. Thought process and insight improved. Pt was calm and cooperative and was in good behavioral control. Patient denied any suicidal or homicidal ideations. Patient denied any auditory or visual hallucinations. Pt was evaluated by treatment team, pt is stable for discharge and patient shows no imminent danger to self, others or property at this time. Pt was also offered admission to rehab however denied.

## 2019-01-04 NOTE — PROGRESS NOTE BEHAVIORAL HEALTH - SECONDARY DX1
Opioid use disorder

## 2019-01-04 NOTE — DISCHARGE NOTE BEHAVIORAL HEALTH - NSBHDCSIGEVENTSFT_PSY_A_CORE
None Pt threatened the treating psychiatrist and treatment team after he was told that Klonopin would not be prescribed to him.   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

## 2019-01-04 NOTE — PROGRESS NOTE BEHAVIORAL HEALTH - SUMMARY
31 y/o  male with Opioid Use DO on Methadone maintenance, cannabis use disorder, reported Bipolar DO and multiple other prior psychiatric diagnosis, numerous prior IPP admissions, 1 reported suicide attempt, presented with symptoms of depressive episode and suicidal ideation with plan to buy a gun and kill himself or jump off the bridge. Patient also endorsed talking to himself but denied any auditory or visual hallucinations and reported periods consistent with hypomania. He was re-initiated Remeron and Depakote.     Patient remains stable in mood, denies any suicidal ideations/auditory or visual hallucinations. Remains complaint with medications. No other acute issues. Likely discharge early next week to Rehab, if patient still amenable.      Plan:  1) Methadone 205mg QD (as verified from [facility] in [facility]).    2) Depakote level 53 (12/31/18). Continue Depakote 500mg PO BID and Remeron 30mg PO QHS. Atarax PRN for anxiety.   3) Betamethasone for foot dermatitis, podiatry consult appreciated.

## 2019-01-04 NOTE — PROGRESS NOTE BEHAVIORAL HEALTH - BEHAVIOR
Cooperative
Uncooperative
Cooperative
Cooperative

## 2019-01-04 NOTE — CHART NOTE - NSCHARTNOTEFT_GEN_A_CORE
Social Work Note:    Patient is calm and cooperative on interview this morning.  His mood is reported as improving.  Treatment plan, medications, and discharge plan discussed.  Plan is for patient to return to his established shelter in [place] and to resume methadone maintenance at his program in [place].     Yesterday patient met with a care coordinator of [facility] [phone], patient's insurance provider.  She will meet with patient again on Sunday to provide any possible case management resources available to the patient in the community.      At this time patient is not psychiatrically stable for discharge.

## 2019-01-04 NOTE — DISCHARGE NOTE BEHAVIORAL HEALTH - MEDICATION SUMMARY - MEDICATIONS TO TAKE
I will START or STAY ON the medications listed below when I get home from the hospital:    methadone 5 mg oral tablet  -- 41 tab(s) by mouth once a day; continue taking outpatient dose until seen by methadone provider  -- Indication: For Opioid use disorder    divalproex sodium 500 mg oral delayed release tablet  -- 1 tab(s) by mouth 2 times a day x 7 days   -- Indication: For Bipolar disorder, current episode mixed, moderate    gabapentin 600 mg oral tablet  -- 1 tab(s) by mouth 3 times a day x 7 days   -- Indication: For nerves    mirtazapine 30 mg oral tablet  -- 1 tab(s) by mouth once a day (at bedtime) x 7 days   -- Indication: For Mood

## 2019-01-04 NOTE — PROGRESS NOTE BEHAVIORAL HEALTH - NSBHFUPTYPE_PSY_A_CORE
Inpatient
Inpatient-On Service Note
Inpatient
Inpatient

## 2019-01-04 NOTE — PROGRESS NOTE BEHAVIORAL HEALTH - NSBHADMITIPOBSFT_PSY_A_CORE
Q20 mins as per unit routine

## 2019-01-04 NOTE — PROGRESS NOTE BEHAVIORAL HEALTH - DETAILS
Dermatitis on feet
+Rash on feet
Dermatitis on feet
+Rash on feet
+Rash on feet

## 2019-01-04 NOTE — PROGRESS NOTE BEHAVIORAL HEALTH - NS ED BHA MSE GENERAL APPEARANCE
Other/Well developed
Well developed

## 2019-01-04 NOTE — PROGRESS NOTE BEHAVIORAL HEALTH - NSBHFUPSUICINTERVAL_PSY_A_CORE
none known
none known
yes
none known

## 2019-01-04 NOTE — PROGRESS NOTE BEHAVIORAL HEALTH - AFFECT QUALITY
Irritable
Euthymic
Anxious/Depressed
Depressed/Anxious
Euthymic
Other/Irritable
Euthymic
Depressed/Anxious

## 2019-01-04 NOTE — PROGRESS NOTE BEHAVIORAL HEALTH - NSBHLEGALSTATUS_PSY_A_CORE
9.39 (Emergency)

## 2019-01-04 NOTE — DISCHARGE NOTE BEHAVIORAL HEALTH - FAMILY HISTORY OF PSYCHIATRIC ILLNESS
Patient completed 8th grade. His mother lives in [place] but is minimally involved. Pt is homeless, [facility]

## 2019-01-04 NOTE — PROGRESS NOTE BEHAVIORAL HEALTH - SECONDARY DX2
Benzodiazepine abuse

## 2019-01-04 NOTE — PROGRESS NOTE BEHAVIORAL HEALTH - RISK ASSESSMENT
Patient's risk factors include male, current mood symptoms, history of prior suicide attempt, history of substance use, poor impulse control and homelessness mitigated by admission to inpatient unit and willing to comply with medications.
Patient's risk factors include male, current mood symptoms, history of prior suicide attempt, history of substance use, poor impulse control and homelessness mitigated by admission to inpatient unit and willing to comply with medications.
Patient's risk factors include male, current mood symptoms, history of prior suicide attempt, history of substance use, and homelessness mitigated by admission to inpatient unit and willing to comply with medications.
Patient's risk factors include male, current mood symptoms, history of prior suicide attempt, history of substance use, poor impulse control and homelessness mitigated by admission to inpatient unit and willing to comply with medications.

## 2019-01-04 NOTE — PROGRESS NOTE BEHAVIORAL HEALTH - THOUGHT PROCESS
Linear
Linear
Perseverative/Linear
Linear
Perseverative/Linear
Linear
Linear
Linear/Perseverative
Linear
Linear/Perseverative

## 2019-01-04 NOTE — PROGRESS NOTE BEHAVIORAL HEALTH - NSBHADMITDANGERSELF_PSY_A_CORE
unable to care for self

## 2019-01-04 NOTE — PROGRESS NOTE BEHAVIORAL HEALTH - NSBHCONSORIP_PSY_A_CORE
Inpatient Admission...
Inpatient Admission...
Consult...
Consult...
Inpatient Admission...

## 2019-01-04 NOTE — DISCHARGE NOTE BEHAVIORAL HEALTH - PAST PSYCHIATRIC HISTORY
Multiple [facility] admissions, most recently discharged from [facility] 2.5 weeks ago. 1 prior SA 1.5 years by overdosing 10 bags of heroin after his father passed away.

## 2019-01-04 NOTE — PROGRESS NOTE BEHAVIORAL HEALTH - MOOD
Irritable
Normal/Anxious/Other
Depressed
Depressed/Anxious
Normal
Irritable/Angry
Normal
Normal/Anxious
Normal/Other
Depressed

## 2019-01-04 NOTE — DISCHARGE NOTE BEHAVIORAL HEALTH - NSBHDCMEDSFT_PSY_A_CORE
Pt was restarted on Depakote 500 mg BID and Remeron 30 mg qhs. He was also restarted on his outpatient dose of methadone 205 mg qd. Pt tolerated the medication well, denied side effect. Depakote level was checked and was 53 on 12/31/18. Pt was restarted on Depakote 500 mg BID and Remeron 30 mg qhs. He was also restarted on his outpatient dose of methadone 205 mg qd. Pt tolerated the medication well, denied side effect. Depakote level was checked and was 53 on 12/31/18. Pt was also restarted on his outpatient dose of gabapentin 600 mg TID.   Pt would frequently ask to be restarted on Klonopin. Pt's UDS was negative for benzodiazepines and he was not prescribed any benzo's as per pt's pharmacy. Pt did not exhibit any benzodiazepine withdrawal symptoms. In addition, it was explained to the patient about the dangers of taking a high dose of methadone with Klonopin. Pt continued to state that he would find another doctor on the outside once he is discharged to prescribe him Klonopin.

## 2019-01-05 RX ADMIN — DIVALPROEX SODIUM 500 MILLIGRAM(S): 500 TABLET, DELAYED RELEASE ORAL at 08:04

## 2019-01-05 RX ADMIN — DIVALPROEX SODIUM 500 MILLIGRAM(S): 500 TABLET, DELAYED RELEASE ORAL at 20:09

## 2019-01-05 RX ADMIN — MIRTAZAPINE 30 MILLIGRAM(S): 45 TABLET, ORALLY DISINTEGRATING ORAL at 20:09

## 2019-01-05 RX ADMIN — GABAPENTIN 600 MILLIGRAM(S): 400 CAPSULE ORAL at 12:15

## 2019-01-05 RX ADMIN — GABAPENTIN 600 MILLIGRAM(S): 400 CAPSULE ORAL at 20:09

## 2019-01-05 RX ADMIN — Medication 1 APPLICATION(S): at 08:04

## 2019-01-05 RX ADMIN — METHADONE HYDROCHLORIDE 205 MILLIGRAM(S): 40 TABLET ORAL at 08:05

## 2019-01-05 RX ADMIN — GABAPENTIN 600 MILLIGRAM(S): 400 CAPSULE ORAL at 08:04

## 2019-01-05 RX ADMIN — Medication 50 MILLIGRAM(S): at 18:16

## 2019-01-05 RX ADMIN — Medication 50 MILLIGRAM(S): at 12:15

## 2019-01-06 RX ADMIN — DIVALPROEX SODIUM 500 MILLIGRAM(S): 500 TABLET, DELAYED RELEASE ORAL at 08:04

## 2019-01-06 RX ADMIN — DIVALPROEX SODIUM 500 MILLIGRAM(S): 500 TABLET, DELAYED RELEASE ORAL at 20:26

## 2019-01-06 RX ADMIN — Medication 1 APPLICATION(S): at 20:26

## 2019-01-06 RX ADMIN — GABAPENTIN 600 MILLIGRAM(S): 400 CAPSULE ORAL at 14:20

## 2019-01-06 RX ADMIN — GABAPENTIN 600 MILLIGRAM(S): 400 CAPSULE ORAL at 08:04

## 2019-01-06 RX ADMIN — MAGNESIUM HYDROXIDE 30 MILLILITER(S): 400 TABLET, CHEWABLE ORAL at 21:54

## 2019-01-06 RX ADMIN — METHADONE HYDROCHLORIDE 205 MILLIGRAM(S): 40 TABLET ORAL at 08:05

## 2019-01-06 RX ADMIN — GABAPENTIN 600 MILLIGRAM(S): 400 CAPSULE ORAL at 20:26

## 2019-01-06 RX ADMIN — Medication 50 MILLIGRAM(S): at 10:38

## 2019-01-06 RX ADMIN — Medication 50 MILLIGRAM(S): at 17:07

## 2019-01-06 RX ADMIN — Medication 1 APPLICATION(S): at 08:04

## 2019-01-06 RX ADMIN — MIRTAZAPINE 30 MILLIGRAM(S): 45 TABLET, ORALLY DISINTEGRATING ORAL at 20:26

## 2019-01-06 NOTE — PROGRESS NOTE ADULT - SUBJECTIVE AND OBJECTIVE BOX
pt stable alert in NAD  no new complaints    MAJOR DEPRESSION  ^FEET PAIN/PSYCH EVAL  Family history of pancreatic cancer (Father)  No pertinent family history in first degree relatives  Handoff  MEWS Score  Polysubstance abuse  PTSD (post-traumatic stress disorder)  Depression, unspecified depression type  Suicidal ideation  Bipolar disorder, current episode mixed, moderate  Major depression  Benzodiazepine abuse  Opioid use disorder  Bipolar disorder, current episode mixed, moderate  Current severe episode of major depressive disorder without psychotic features without prior episode  PTSD (post-traumatic stress disorder)  Major depression  No significant past surgical history  FEET PAIN/PSYCH EVAL  90+  Opioid use disorder  Homelessness    HEALTH ISSUES - PROBLEM Dx:  Benzodiazepine abuse  Opioid use disorder  Bipolar disorder, current episode mixed, moderate  Current severe episode of major depressive disorder without psychotic features without prior episode  PTSD (post-traumatic stress disorder): PTSD (post-traumatic stress disorder)  Major depression: Major depression        PAST MEDICAL & SURGICAL HISTORY:  Polysubstance abuse  PTSD (post-traumatic stress disorder)  Depression, unspecified depression type  Suicidal ideation  No significant past surgical history    Haldol (Dystonic RXN)  Talwin (Unknown)      FAMILY HISTORY:  Family history of pancreatic cancer (Father)      betamethasone valerate 0.1% Lotion 1 Application(s) Topical two times a day  diVALproex  milliGRAM(s) Oral two times a day  gabapentin 600 milliGRAM(s) Oral three times a day  hydrOXYzine hydrochloride 50 milliGRAM(s) Oral every 6 hours PRN  magnesium hydroxide Suspension 30 milliLiter(s) Oral daily PRN  methadone    Tablet 205 milliGRAM(s) Oral daily  mirtazapine 30 milliGRAM(s) Oral at bedtime  nicotine  Polacrilex Gum 2 milliGRAM(s) Oral every 2 hours PRN      T(C): 36.8 (01-05-19 @ 18:15), Max: 36.8 (01-05-19 @ 18:15)  HR: 64 (01-05-19 @ 18:15) (64 - 64)  BP: 106/57 (01-05-19 @ 18:15) (106/57 - 106/57)  RR: 16 (01-05-19 @ 18:15) (16 - 16)  SpO2: --    PE;  general:  no acute changes from previous    Lungs:    Heart:    EXT:    Neuro:  no deficits ntoed                          CAPILLARY BLOOD GLUCOSE

## 2019-01-07 VITALS — RESPIRATION RATE: 18 BRPM | HEART RATE: 68 BPM | TEMPERATURE: 96 F

## 2019-01-07 RX ORDER — MIRTAZAPINE 45 MG/1
1 TABLET, ORALLY DISINTEGRATING ORAL
Qty: 0 | Refills: 0 | COMMUNITY
Start: 2019-01-07

## 2019-01-07 RX ORDER — DIVALPROEX SODIUM 500 MG/1
1 TABLET, DELAYED RELEASE ORAL
Qty: 14 | Refills: 0 | OUTPATIENT
Start: 2019-01-07 | End: 2019-01-13

## 2019-01-07 RX ORDER — GABAPENTIN 400 MG/1
1 CAPSULE ORAL
Qty: 0 | Refills: 0 | COMMUNITY
Start: 2019-01-07

## 2019-01-07 RX ORDER — GABAPENTIN 400 MG/1
1 CAPSULE ORAL
Qty: 21 | Refills: 0 | OUTPATIENT
Start: 2019-01-07 | End: 2019-01-13

## 2019-01-07 RX ORDER — MIRTAZAPINE 45 MG/1
1 TABLET, ORALLY DISINTEGRATING ORAL
Qty: 7 | Refills: 0 | OUTPATIENT
Start: 2019-01-07 | End: 2019-01-13

## 2019-01-07 RX ORDER — METHADONE HYDROCHLORIDE 40 MG/1
41 TABLET ORAL
Qty: 0 | Refills: 0 | COMMUNITY
Start: 2019-01-07

## 2019-01-07 RX ADMIN — DIVALPROEX SODIUM 500 MILLIGRAM(S): 500 TABLET, DELAYED RELEASE ORAL at 08:23

## 2019-01-07 RX ADMIN — GABAPENTIN 600 MILLIGRAM(S): 400 CAPSULE ORAL at 08:23

## 2019-01-07 RX ADMIN — Medication 1 APPLICATION(S): at 08:23

## 2019-01-07 RX ADMIN — METHADONE HYDROCHLORIDE 205 MILLIGRAM(S): 40 TABLET ORAL at 08:23

## 2019-01-07 NOTE — CHART NOTE - NSCHARTNOTEFT_GEN_A_CORE
Social Work Discharge Note:    Patient is for discharge today.   He is alert and oriented x3.  Mood is improved.  Anxiety has decreased.  Insight and judgment have improved.  Suicidal / homicidal ideation denied.      Patient will be discharged to [facility] at [facility] [address] [phone].  [place] [facility] ID – [id number] as verified by Department of Homeless Services.     Plan is for patient to resume treatment at [facility] at their [address] [id number] [phone].  This worker spoke with Director of program Michael Carver, Clinical Director [phone].  There he will receive methadone and mental health treatment where he was established client prior to this admission.  He is aware and agreeable to same.      Patient is aware and agreeable to discharge today.

## 2019-01-09 DIAGNOSIS — Z88.8 ALLERGY STATUS TO OTHER DRUGS, MEDICAMENTS AND BIOLOGICAL SUBSTANCES: ICD-10-CM

## 2019-01-09 DIAGNOSIS — B35.3 TINEA PEDIS: ICD-10-CM

## 2019-01-09 DIAGNOSIS — Z59.0 HOMELESSNESS: ICD-10-CM

## 2019-01-09 DIAGNOSIS — F31.62 BIPOLAR DISORDER, CURRENT EPISODE MIXED, MODERATE: ICD-10-CM

## 2019-01-09 DIAGNOSIS — F13.20 SEDATIVE, HYPNOTIC OR ANXIOLYTIC DEPENDENCE, UNCOMPLICATED: ICD-10-CM

## 2019-01-09 DIAGNOSIS — R45.851 SUICIDAL IDEATIONS: ICD-10-CM

## 2019-01-09 DIAGNOSIS — F12.90 CANNABIS USE, UNSPECIFIED, UNCOMPLICATED: ICD-10-CM

## 2019-01-09 DIAGNOSIS — L30.9 DERMATITIS, UNSPECIFIED: ICD-10-CM

## 2019-01-09 DIAGNOSIS — F43.10 POST-TRAUMATIC STRESS DISORDER, UNSPECIFIED: ICD-10-CM

## 2019-01-09 DIAGNOSIS — F32.9 MAJOR DEPRESSIVE DISORDER, SINGLE EPISODE, UNSPECIFIED: ICD-10-CM

## 2019-01-09 DIAGNOSIS — F17.210 NICOTINE DEPENDENCE, CIGARETTES, UNCOMPLICATED: ICD-10-CM

## 2019-01-09 DIAGNOSIS — G47.00 INSOMNIA, UNSPECIFIED: ICD-10-CM

## 2019-01-09 DIAGNOSIS — L85.9 EPIDERMAL THICKENING, UNSPECIFIED: ICD-10-CM

## 2019-01-09 DIAGNOSIS — L29.9 PRURITUS, UNSPECIFIED: ICD-10-CM

## 2019-01-09 DIAGNOSIS — Z28.21 IMMUNIZATION NOT CARRIED OUT BECAUSE OF PATIENT REFUSAL: ICD-10-CM

## 2019-01-09 DIAGNOSIS — Z91.19 PATIENT'S NONCOMPLIANCE WITH OTHER MEDICAL TREATMENT AND REGIMEN: ICD-10-CM

## 2019-01-09 DIAGNOSIS — F11.20 OPIOID DEPENDENCE, UNCOMPLICATED: ICD-10-CM

## 2019-01-09 SDOH — ECONOMIC STABILITY - HOUSING INSECURITY: HOMELESSNESS: Z59.0

## 2019-01-25 NOTE — DISCHARGE NOTE ADULT - DISCHARGE TO
Detail Level: Zone Continue Regimen: Clobetasol 0.05% cream BID Plan: Prescriptions sent to the [facility] detox

## 2020-04-20 NOTE — ED ADULT NURSE NOTE - PAIN RATING/NUMBER SCALE (0-10): ACTIVITY
Can we do a visit with patient today   
From: Dario Roy  To: Daniel Zhao MD  Sent: 4/19/2020 9:28 PM CDT  Subject: Medication Question    Good evening Dr Zhao,   I recently had a yeast infection and had a three day cream treatment, that seemed to work but I'm not sure it was strong enough since I'm starting to have the same systems again this week, I have a clear white discharge, no foul smell (no smell at) and starting to get itchy again! Is there a test I take, can I do the cream again is there tablet I can take to maybe knock it out completely?     Thank you!!!  Dario  
0

## 2020-09-10 NOTE — BEHAVIORAL HEALTH ASSESSMENT NOTE - NS ED BHA DEMOGRAPHICS MEDICAL RECORD REVIEWED CONSENT OBTAINED YN
Father Bailey Perry is overall stable from cardiac perspective but he is noted to have persistently elevated blood pressures  He is on good dose of losartan and small dose of metoprolol succinate  He was previously on amlodipine but did not tolerate it due to lower extremity edema  His renal function and potassium are normal   He has cut down his smoking significantly  He has concurrent history of ascending thoracic aortic aneurysm and interatrial septal aneurysm  He has had no history of TIA or CVA  Physical examination does not identify signs of heart failure or significant valvular heart disease  -- at this time we adding RAAS antagonist with spironolactone 25 mg once daily in the morning to his regimen  Further we are increasing the dose of metoprolol succinate to 50 mg once daily  We will continue the current dose of losartan at 100 mg daily  -- about 1-2 weeks after initiating spironolactone therapy we will get a basic metabolic panel to reassess his renal function and electrolytes  -- I have encouraged him to completely quit smoking   -- is advised to continue current statin therapy with Vytorin and aspirin therapy  -- Dietary and medical compliance are reinforced  -- Advised  to report any concerning symptoms such as chest pain, shortness of breath, decline in exercise tolerance or presyncope/syncope 
Yes

## 2021-03-15 NOTE — ED BEHAVIORAL HEALTH ASSESSMENT NOTE - NS ED BHA REVIEW OF ED CHART AVAILABLE IMAGING REVIEWED
Bonifacio Kinney from [facility] is with Gilda Chaney now and she spoke with Gilda Chaney and she is willing to try anti depressants meds which had been discussed previously  Pt would like them sent to [facility]  Pt also reports that she still has burning with urination  She finished the abx already  Yes

## 2021-08-12 NOTE — PATIENT PROFILE BEHAVIORAL HEALTH - NSBHTYPIDEA_PSY_A_CORE
pts  brought in the urine sample for urine culture, okayed by Tata. Pt was advise through portal message, urine sample was required in office. Sending urine culture   -RT   passive

## 2022-12-27 NOTE — BEHAVIORAL HEALTH ASSESSMENT NOTE - AFFECT CONGRUENCE
[facility] [address]  [address] [place] [address]  [phone]      Brijesh Lucero :1996 MRN:[id number]    2022 Time Session Began: 2:00PM  Time Session Ended: 2:51PM    In Person: This visit was performed in person. Consent to Treatment form was reviewed, discussed, and signed with patient and legal guardian.    Session Type:Therapy 38-52 minutes (81640)    Others Present: N/A    Intervention: Cognitive, Supportive    Suicide/Homicide/Violence Ideation: No    If Yes, explain: N/A    No current outpatient medications on file.     No current facility-administered medications for this visit.       Change in Medication(s) Reported: No  If Yes, explain: N/A    Patient/Family Education Provided: Yes  Patient/Family Displays Understanding: Yes    If No, explain: N/A    Chief complaint in patient's own words: \"My wife and I are aleyda right now.\"    Progress Note containing chief complaint and symptoms/problems related to the complaint:    (Data/Action/Response/Plan)    D: Patient reported his holiday was fine. Patient reported that his wife and him are aleyda right now. Patient reported that he has been 15 days clean of cigarettes.   A: Therapist conducted assessments. Therapist collaborated with the patient to update his treatment plan goals and objectives. Patient processed through recent conflicts with his wife. Therapist taught the communication skill mirroring. Therapist demonstrated how to mirror back to reduce miscommunications from different perspectives.  R: Patient was receptive.  P: Patient will be seen in two weeks. Patient will work on mirroring in communication.    Need for Community Resources Assessed: Yes    Resources Needed: No    If Yes, what resources: N/A    Diagnosis: Moderate episode of recurrent major depressive disorder (CMS/HCC)  (primary encounter diagnosis)    Treatment Plan: See Treatment  Plan    Discharge Plan: Strategies Discussed to Maintain Gains    Next Appointment: Two Weeks  CINDY SchroederW   Congruent
